# Patient Record
Sex: MALE | Race: WHITE | Employment: OTHER | URBAN - METROPOLITAN AREA
[De-identification: names, ages, dates, MRNs, and addresses within clinical notes are randomized per-mention and may not be internally consistent; named-entity substitution may affect disease eponyms.]

---

## 2017-01-24 DIAGNOSIS — I10 ESSENTIAL HYPERTENSION WITH GOAL BLOOD PRESSURE LESS THAN 140/90: Primary | ICD-10-CM

## 2017-01-24 RX ORDER — LISINOPRIL 10 MG/1
10 TABLET ORAL DAILY
Qty: 90 TABLET | Refills: 1 | Status: SHIPPED | OUTPATIENT
Start: 2017-01-24 | End: 2017-08-01

## 2017-01-24 NOTE — TELEPHONE ENCOUNTER
Prescription approved per [facility] Refill Protocol.  Refill x 6 months.    Zoila Crotez, [facility]  [phone]

## 2017-02-23 ENCOUNTER — TELEPHONE (OUTPATIENT)
Dept: FAMILY MEDICINE | Facility: CLINIC | Age: 72
End: 2017-02-23

## 2017-02-23 DIAGNOSIS — I10 HYPERTENSION GOAL BP (BLOOD PRESSURE) < 140/90: Primary | ICD-10-CM

## 2017-02-23 NOTE — TELEPHONE ENCOUNTER
"Per 8/4/16 BMP result note:  \"Notes Recorded by Fern Sheppard MD on 8/4/2016 at 4:47 PM  Your potassium was in the normal range. Your kidney function is mildly low, but appears fairly stable over the past couple of years. I recommend monitoring this about every 6 months. In the meantime, avoid NSAID medications (like ibuprofen, aleve, naproxen, advil, etc) which can damage the kidneys and try to remain well hydrated. \"      BMP pended.    Routing to covering providers.    Please review and sign if agree.    Thank you!  RICA WilloughbyN, RN      "

## 2017-02-23 NOTE — TELEPHONE ENCOUNTER
Patient called with the understanding Dr Sheppard wanted him to get a Metobolic panel lab but there's no order    Please advise

## 2017-02-24 NOTE — TELEPHONE ENCOUNTER
ASSESSMENT / PLAN:  (I10) Hypertension goal BP (blood pressure) < 140/90  (primary encounter diagnosis)  Comment: future labs signed.  Plan: Basic metabolic panel  (Ca, Cl, CO2, Creat,         Gluc, K, Na, BUN), Albumin Random Urine         Quantitative        Sincerely,  Dr. Alaina Dobbs MD  2/23/2017

## 2017-08-01 ENCOUNTER — OFFICE VISIT (OUTPATIENT)
Dept: FAMILY MEDICINE | Facility: CLINIC | Age: 72
End: 2017-08-01
Payer: MEDICARE

## 2017-08-01 VITALS
RESPIRATION RATE: 14 BRPM | BODY MASS INDEX: 18.75 KG/M2 | OXYGEN SATURATION: 99 % | TEMPERATURE: 98.2 F | DIASTOLIC BLOOD PRESSURE: 71 MMHG | WEIGHT: 141.5 LBS | HEIGHT: 73 IN | SYSTOLIC BLOOD PRESSURE: 145 MMHG | HEART RATE: 78 BPM

## 2017-08-01 DIAGNOSIS — E78.5 HYPERLIPIDEMIA LDL GOAL <130: ICD-10-CM

## 2017-08-01 DIAGNOSIS — N18.30 CKD (CHRONIC KIDNEY DISEASE) STAGE 3, GFR 30-59 ML/MIN (H): ICD-10-CM

## 2017-08-01 DIAGNOSIS — I10 ESSENTIAL HYPERTENSION WITH GOAL BLOOD PRESSURE LESS THAN 140/90: ICD-10-CM

## 2017-08-01 DIAGNOSIS — Z00.00 MEDICARE ANNUAL WELLNESS VISIT, SUBSEQUENT: Primary | ICD-10-CM

## 2017-08-01 DIAGNOSIS — N40.0 BENIGN PROSTATIC HYPERPLASIA, PRESENCE OF LOWER URINARY TRACT SYMPTOMS UNSPECIFIED: ICD-10-CM

## 2017-08-01 DIAGNOSIS — Z12.11 COLON CANCER SCREENING: ICD-10-CM

## 2017-08-01 PROBLEM — I12.9 BENIGN HYPERTENSION WITH CKD (CHRONIC KIDNEY DISEASE) STAGE III (H): Status: ACTIVE | Noted: 2017-08-01

## 2017-08-01 LAB
ALBUMIN SERPL-MCNC: 4 G/DL (ref 3.4–5)
ALP SERPL-CCNC: 65 U/L (ref 40–150)
ALT SERPL W P-5'-P-CCNC: 19 U/L (ref 0–70)
ANION GAP SERPL CALCULATED.3IONS-SCNC: 8 MMOL/L (ref 3–14)
AST SERPL W P-5'-P-CCNC: 16 U/L (ref 0–45)
BILIRUB SERPL-MCNC: 0.8 MG/DL (ref 0.2–1.3)
BUN SERPL-MCNC: 21 MG/DL (ref 7–30)
CALCIUM SERPL-MCNC: 9.3 MG/DL (ref 8.5–10.1)
CHLORIDE SERPL-SCNC: 108 MMOL/L (ref 94–109)
CHOLEST SERPL-MCNC: 163 MG/DL
CO2 SERPL-SCNC: 27 MMOL/L (ref 20–32)
CREAT SERPL-MCNC: 1.24 MG/DL (ref 0.66–1.25)
CREAT UR-MCNC: 260 MG/DL
GFR SERPL CREATININE-BSD FRML MDRD: 57 ML/MIN/1.7M2
GLUCOSE SERPL-MCNC: 102 MG/DL (ref 70–99)
HDLC SERPL-MCNC: 82 MG/DL
HGB BLD-MCNC: 14.3 G/DL (ref 13.3–17.7)
LDLC SERPL CALC-MCNC: 69 MG/DL
MICROALBUMIN UR-MCNC: 19 MG/L
MICROALBUMIN/CREAT UR: 7.19 MG/G CR (ref 0–17)
NONHDLC SERPL-MCNC: 81 MG/DL
POTASSIUM SERPL-SCNC: 5.2 MMOL/L (ref 3.4–5.3)
PROT SERPL-MCNC: 6.9 G/DL (ref 6.8–8.8)
SODIUM SERPL-SCNC: 143 MMOL/L (ref 133–144)
TRIGL SERPL-MCNC: 60 MG/DL

## 2017-08-01 PROCEDURE — 80061 LIPID PANEL: CPT | Performed by: FAMILY MEDICINE

## 2017-08-01 PROCEDURE — 82043 UR ALBUMIN QUANTITATIVE: CPT | Performed by: FAMILY MEDICINE

## 2017-08-01 PROCEDURE — 85018 HEMOGLOBIN: CPT | Performed by: FAMILY MEDICINE

## 2017-08-01 PROCEDURE — 36415 COLL VENOUS BLD VENIPUNCTURE: CPT | Performed by: FAMILY MEDICINE

## 2017-08-01 PROCEDURE — 80053 COMPREHEN METABOLIC PANEL: CPT | Performed by: FAMILY MEDICINE

## 2017-08-01 PROCEDURE — 99397 PER PM REEVAL EST PAT 65+ YR: CPT | Performed by: FAMILY MEDICINE

## 2017-08-01 RX ORDER — LISINOPRIL 10 MG/1
10 TABLET ORAL DAILY
Qty: 90 TABLET | Refills: 3 | Status: SHIPPED | OUTPATIENT
Start: 2017-08-01 | End: 2018-09-11

## 2017-08-01 RX ORDER — SIMVASTATIN 40 MG
40 TABLET ORAL AT BEDTIME
Qty: 90 TABLET | Refills: 3 | Status: SHIPPED | OUTPATIENT
Start: 2017-08-01 | End: 2018-09-11

## 2017-08-01 NOTE — PROGRESS NOTES
SUBJECTIVE:   Chavez Perkins is a 71 year old male who presents for Preventive Visit.  Are you in the first 12 months of your Medicare coverage?  No    Physical   Annual:     Getting at least 3 servings of Calcium per day::  Yes    Bi-annual eye exam::  Yes    Dental care twice a year::  Yes    Sleep apnea or symptoms of sleep apnea::  None    Taking medications regularly::  Yes    Medication side effects::  None    Additional concerns today::  No    COGNITIVE SCREEN  1) Repeat 3 items (Banana, Sunrise, Chair)    2) Clock draw: NORMAL  3) 3 item recall: Recalls 3 objects  Results: 3 items recalled: COGNITIVE IMPAIRMENT LESS LIKELY  Mini-CogTM Copyright S Amari. Licensed by the author for use in [facility] [employer]; reprinted with permission (jess@[email or web address]). All rights reserved.      Answers for HPI/ROS submitted by the patient on 8/1/2017   PHQ-2 Score: 0      Stopped HCTZ back in 5/2015 with increase urinary frequency at night. He notes that now he is getting up frequently regardless with BPH. After stopping the med he urinary frequency improved, but then returned to normal a few months after.        Wt Readings from Last 5 Encounters:   08/01/17 64.2 kg (141 lb 8 oz)   07/25/16 61.3 kg (135 lb 4 oz)   06/09/15 63.3 kg (139 lb 8 oz)   05/19/15 61.2 kg (135 lb)   11/19/14 62.3 kg (137 lb 4.8 oz)     Reviewed and updated as needed this visit by clinical staff  Reviewed and updated as needed this visit by Provider  Social History   Substance Use Topics     Smoking status: Never Smoker     Smokeless tobacco: Never Used     Alcohol use No     The patient does not drink >3 drinks per day nor >7 drinks per week.    Today's PHQ-2 Score:   PHQ-2 ( 1999 [employer]) 8/1/2017   Q1: Little interest or pleasure in doing things 0   Q2: Feeling down, depressed or hopeless 0   PHQ-2 Score 0   Q1: Little interest or pleasure in doing things Not at all   Q2: Feeling down, depressed or hopeless Not at all   PHQ-2 Score 0     Do you feel  safe in your environment - Yes    Do you have a Health Care Directive?: No: Advance care planning reviewed with patient; information given to patient to review.    Current providers sharing in care for this patient include: Patient Care Team:  Fern Sheppard MD as PCP - General (Family Practice)      Hearing impairment: No    Ability to successfully perform activities of daily living: Yes, no assistance needed     Fall risk:  Fallen 2 or more times in the past year?: No  Any fall with injury in the past year?: No    Home safety:  none identified    The following health maintenance items are reviewed in Epic and correct as of today:  Health Maintenance   Topic Date Due     HEMOGLOBIN Q1 YR  11/07/2015     MICROALBUMIN Q1 YEAR  05/19/2016     CMP Q1 YR  07/25/2017     LIPID MONITORING Q1 YEAR  07/25/2017     FALL RISK ASSESSMENT  07/25/2017     INFLUENZA VACCINE (SYSTEM ASSIGNED)  09/01/2017     TETANUS IMMUNIZATION (SYSTEM ASSIGNED)  01/14/2018     COLON CANCER SCREEN (SYSTEM ASSIGNED)  02/05/2018     ADVANCE DIRECTIVE PLANNING Q5 YRS  04/26/2018     PNEUMOCOCCAL  Completed     AORTIC ANEURYSM SCREENING (SYSTEM ASSIGNED)  Completed     BP Readings from Last 3 Encounters:   08/01/17 140/66   07/25/16 108/62   06/09/15 112/70    Wt Readings from Last 3 Encounters:   08/01/17 64.2 kg (141 lb 8 oz)   07/25/16 61.3 kg (135 lb 4 oz)   06/09/15 63.3 kg (139 lb 8 oz)         Patient Active Problem List   Diagnosis     HYPERLIPIDEMIA LDL GOAL <130     Advanced directives, counseling/discussion     Closed kidney laceration     BPH (benign prostatic hypertrophy)     CKD (chronic kidney disease) stage 3, GFR 30-59 ml/min     Benign hypertension with CKD (chronic kidney disease) stage III     Past Surgical History:   Procedure Laterality Date     HC COLONOSCOPY THRU STOMA, DIAGNOSTIC  2/5/2008    Normal     HC REMOVAL OF TONSILS,<13 Y/O  1950     STRESS ECHO (METRO)  1/06    passed       Social History   Substance Use Topics      Smoking status: Never Smoker     Smokeless tobacco: Never Used     Alcohol use No     Family History   Problem Relation Age of Onset     Hypertension Paternal Grandmother      C.A.D. Brother      C.A.D. Brother      DIABETES Brother      DIABETES Brother      CEREBROVASCULAR DISEASE Paternal Grandfather      Allergies Other      neice     Blood Disease Brother      low hgb         Current Outpatient Prescriptions   Medication Sig Dispense Refill     lisinopril (PRINIVIL/ZESTRIL) 10 MG tablet Take 1 tablet (10 mg) by mouth daily 90 tablet 3     simvastatin (ZOCOR) 40 MG tablet Take 1 tablet (40 mg) by mouth At Bedtime For cholesterol 90 tablet 3     ASPIRIN 81 MG OR TABS 1 tab po QD (Once per day) 100 3     [DISCONTINUED] lisinopril (PRINIVIL/ZESTRIL) 10 MG tablet Take 1 tablet (10 mg) by mouth daily 90 tablet 1     [DISCONTINUED] simvastatin (ZOCOR) 40 MG tablet Take 1 tablet (40 mg) by mouth At Bedtime For cholesterol 90 tablet 3     Allergies   Allergen Reactions     No Known Allergies      Recent Labs   Lab Test  08/04/16   0754  07/25/16   1244  05/19/15   1021   05/13/14   1052  04/26/13   0955  04/24/12   0839   03/05/10   0756   LDL   --   71  68   --   89  93  80   < >  100   HDL   --   74  82   --   78  88  70   < >  71   TRIG   --   48  71   --   69  69  65   < >  67   ALT   --   27   --    --    --   27  14   < >  24   CR  1.30*  1.19   --    < >  1.17  1.16  1.15   < >  1.11   GFRESTIMATED  54*  60*   --    < >  62  63  63   < >  66   GFRESTBLACK  66  73   --    < >  75  76  77   < >  80   POTASSIUM  4.4  5.7*   --    < >  4.7  4.0  4.0   < >  4.2   TSH   --    --    --    --    --   2.35   --    --   2.09    < > = values in this interval not displayed.      Pneumonia Vaccine: COMPLETED     ROS:   ROS: 10 point ROS neg other than the symptoms noted above in the HPI.    This document serves as a record of the services and decisions personally performed and made by Fern Sheppard MD. It was created on  "his/her behalf by Yumiko Meng, trained medical scribe. The creation of this document is based the provider's statements to the medical scribes.    Clifotnibjaspal Meng, August 1, 2017  OBJECTIVE:   /66  Pulse 78  Temp 98.2  F (36.8  C) (Oral)  Resp 14  Ht 1.842 m (6' 0.5\")  Wt 64.2 kg (141 lb 8 oz)  SpO2 99%  BMI 18.93 kg/m2 Estimated body mass index is 18.93 kg/(m^2) as calculated from the following:    Height as of this encounter: 1.842 m (6' 0.5\").    Weight as of this encounter: 64.2 kg (141 lb 8 oz).  EXAM:   GENERAL: healthy, alert and no distress  EYES: Eyes grossly normal to inspection, PERRL and conjunctivae and sclerae normal  HENT: ear canals and TM's normal, nose and mouth without ulcers or lesions  NECK: no adenopathy, no asymmetry, masses, or scars and thyroid normal to palpation  RESP: lungs clear to auscultation - no rales, rhonchi or wheezes  CV: regular rate and rhythm, normal S1 S2, no S3 or S4, no murmur, click or rub, no peripheral edema and peripheral pulses strong  ABDOMEN: soft, nontender, no hepatosplenomegaly, no masses and bowel sounds normal  MS: no gross musculoskeletal defects noted, no edema  SKIN: no suspicious lesions or rashes  NEURO: Normal strength and tone, mentation intact and speech normal  PSYCH: mentation appears normal, affect normal/bright    ASSESSMENT / PLAN:   1. Medicare annual wellness visit, subsequent  He will schedule colonoscopy.  Fasting labs today.    2. Essential hypertension with goal blood pressure less than 140/90  Slightly elevated. MA will recheck. If still elevated he will return to clinic for an MA BP check.   He was previously on HCTZ, stopped 5/2015. Continues on lisinopril 10 mg.  - lisinopril (PRINIVIL/ZESTRIL) 10 MG tablet; Take 1 tablet (10 mg) by mouth daily  Dispense: 90 tablet; Refill: 3    3. CKD (chronic kidney disease) stage 3, GFR 30-59 ml/min  Stable.   - Comprehensive metabolic panel  - Albumin Random Urine " "Quantitative  - Hemoglobin    4. Benign prostatic hyperplasia, presence of lower urinary tract symptoms unspecified  Stable.  He continues to have nocturia, that has been stable since going off HCTZ in 2015.    5. Hyperlipidemia LDL goal <130  Stable. Continues simvastatin 40 mg.  - simvastatin (ZOCOR) 40 MG tablet; Take 1 tablet (40 mg) by mouth At Bedtime For cholesterol  Dispense: 90 tablet; Refill: 3  - Lipid panel reflex to direct LDL    6. Colon cancer screening    - GASTROENTEROLOGY ADULT REF PROCEDURE ONLY      End of Life Planning:  Patient currently has an advanced directive: No.  I have verified the patient's ablity to prepare an advanced directive/make health care decisions.  Literature was provided to assist patient in preparing an advanced directive.    COUNSELING:  Reviewed preventive health counseling, as reflected in patient instructions  Estimated body mass index is 18.93 kg/(m^2) as calculated from the following:    Height as of this encounter: 1.842 m (6' 0.5\").    Weight as of this encounter: 64.2 kg (141 lb 8 oz).  Weight management plan noted, stable and monitoring   reports that he has never smoked. He has never used smokeless tobacco.     Appropriate preventive services were discussed with this patient, including applicable screening as appropriate for cardiovascular disease, diabetes, osteopenia/osteoporosis, and glaucoma.  As appropriate for age/gender, discussed screening for colorectal cancer, prostate cancer, breast cancer, and cervical cancer. Checklist reviewing preventive services available has been given to the patient.    Reviewed patients plan of care and provided an AVS. The Basic Care Plan (routine screening as documented in Health Maintenance) for Chavez meets the Care Plan requirement. This Care Plan has been established and reviewed with the Patient.    Counseling Resources:  ATP IV Guidelines  Pooled Cohorts Equation Calculator  Breast Cancer Risk Calculator  FRAX Risk " Assessment  ICSI Preventive Guidelines  Dietary Guidelines for Americans, 2010  USDA's MyPlate  ASA Prophylaxis  Lung CA Screening    The information in this document, created by the medical scribe for me, accurately reflects the services I personally performed and the decisions made by me. I have reviewed and approved this document for accuracy. 08/01/17    Fern Sheppard MD  [facility]

## 2017-08-01 NOTE — LETTER
August 30, 2017      Chavez Perkins  [address]  [place] [address]        Dear ,    We are writing to inform you of your test results.    Your urine protein test was stable. Please let us know if you have any questions.    Resulted Orders   Comprehensive metabolic panel   Result Value Ref Range    Sodium 143 133 - 144 mmol/L    Potassium 5.2 3.4 - 5.3 mmol/L    Chloride 108 94 - 109 mmol/L    Carbon Dioxide 27 20 - 32 mmol/L    Anion Gap 8 3 - 14 mmol/L    Glucose 102 (H) 70 - 99 mg/dL      Comment:      Fasting specimen    Urea Nitrogen 21 7 - 30 mg/dL    Creatinine 1.24 0.66 - 1.25 mg/dL    GFR Estimate 57 (L) >60 mL/min/1.7m2      Comment:      Non  GFR Calc    GFR Estimate If Black 69 >60 mL/min/1.7m2      Comment:       GFR Calc    Calcium 9.3 8.5 - 10.1 mg/dL    Bilirubin Total 0.8 0.2 - 1.3 mg/dL    Albumin 4.0 3.4 - 5.0 g/dL    Protein Total 6.9 6.8 - 8.8 g/dL    Alkaline Phosphatase 65 40 - 150 U/L    ALT 19 0 - 70 U/L    AST 16 0 - 45 U/L   Lipid panel reflex to direct LDL   Result Value Ref Range    Cholesterol 163 <200 mg/dL    Triglycerides 60 <150 mg/dL      Comment:      Fasting specimen    HDL Cholesterol 82 >39 mg/dL    LDL Cholesterol Calculated 69 <100 mg/dL      Comment:      Desirable:       <100 mg/dl    Non HDL Cholesterol 81 <130 mg/dL   Albumin Random Urine Quantitative   Result Value Ref Range    Creatinine Urine 260 mg/dL    Albumin Urine mg/L 19 mg/L    Albumin Urine mg/g Cr 7.19 0 - 17 mg/g Cr   Hemoglobin   Result Value Ref Range    Hemoglobin 14.3 13.3 - 17.7 g/dL       If you have any questions or concerns, please call the clinic at the number listed above.       Sincerely,        Fern Sheppard MD/nr

## 2017-08-01 NOTE — LETTER
Chavez Perkins  [address]  [place] [facility] [address]        August 2, 2017          Dear ,    We are writing to inform you of your test results.    Your lab results are stable. Please let us know if you have any questions    Resulted Orders   Comprehensive metabolic panel   Result Value Ref Range    Sodium 143 133 - 144 mmol/L    Potassium 5.2 3.4 - 5.3 mmol/L    Chloride 108 94 - 109 mmol/L    Carbon Dioxide 27 20 - 32 mmol/L    Anion Gap 8 3 - 14 mmol/L    Glucose 102 (H) 70 - 99 mg/dL      Comment:      Fasting specimen    Urea Nitrogen 21 7 - 30 mg/dL    Creatinine 1.24 0.66 - 1.25 mg/dL    GFR Estimate 57 (L) >60 mL/min/1.7m2      Comment:      Non  GFR Calc    GFR Estimate If Black 69 >60 mL/min/1.7m2      Comment:       GFR Calc    Calcium 9.3 8.5 - 10.1 mg/dL    Bilirubin Total 0.8 0.2 - 1.3 mg/dL    Albumin 4.0 3.4 - 5.0 g/dL    Protein Total 6.9 6.8 - 8.8 g/dL    Alkaline Phosphatase 65 40 - 150 U/L    ALT 19 0 - 70 U/L    AST 16 0 - 45 U/L   Lipid panel reflex to direct LDL   Result Value Ref Range    Cholesterol 163 <200 mg/dL    Triglycerides 60 <150 mg/dL      Comment:      Fasting specimen    HDL Cholesterol 82 >39 mg/dL    LDL Cholesterol Calculated 69 <100 mg/dL      Comment:      Desirable:       <100 mg/dl    Non HDL Cholesterol 81 <130 mg/dL   Hemoglobin   Result Value Ref Range    Hemoglobin 14.3 13.3 - 17.7 g/dL       If you have any questions or concerns, please call the clinic at the number listed above.       Sincerely,        Fern Sheppard MD/nr

## 2017-08-01 NOTE — MR AVS SNAPSHOT
After Visit Summary   8/1/2017    Chavez Perkins    MRN: [id number]           Patient Information     Date Of Birth          1945        Visit Information        Provider Department      8/1/2017 8:20 AM Fern Sheppard MD [facility]        Today's Diagnoses     Medicare annual wellness visit, subsequent    -  1    Essential hypertension with goal blood pressure less than 140/90        CKD (chronic kidney disease) stage 3, GFR 30-59 ml/min        Benign prostatic hyperplasia, presence of lower urinary tract symptoms unspecified        Hyperlipidemia LDL goal <130        Colon cancer screening          Care Instructions      Preventive Health Recommendations:       Male Ages 65 and over    Yearly exam:             See your health care provider every year in order to  o   Review health changes.   o   Discuss preventive care.    o   Review your medicines if your doctor has prescribed any.    Talk with your health care provider about whether you should have a test to screen for prostate cancer (PSA).    Every 3 years, have a diabetes test (fasting glucose). If you are at risk for diabetes, you should have this test more often.    Every 5 years, have a cholesterol test. Have this test more often if you are at risk for high cholesterol or heart disease.     Every 10 years, have a colonoscopy. Or, have a yearly FIT test (stool test). These exams will check for colon cancer.    Talk to with your health care provider about screening for Abdominal Aortic Aneurysm if you have a family history of AAA or have a history of smoking.  Shots:     Get a flu shot each year.     Get a tetanus shot every 10 years.     Talk to your doctor about your pneumonia vaccines. There are now two you should receive - Pneumovax (PPSV 23) and Prevnar (PCV 13).    Talk to your doctor about a shingles vaccine.     Talk to your doctor about the hepatitis B vaccine.  Nutrition:     Eat at least 5 servings of fruits and  vegetables each day.     Eat whole-grain bread, whole-wheat pasta and brown rice instead of white grains and rice.     Talk to your doctor about Calcium and Vitamin D.   Lifestyle    Exercise for at least 150 minutes a week (30 minutes a day, 5 days a week). This will help you control your weight and prevent disease.     Limit alcohol to one drink per day.     No smoking.     Wear sunscreen to prevent skin cancer.     See your dentist every six months for an exam and cleaning.     See your eye doctor every 1 to 2 years to screen for conditions such as glaucoma, macular degeneration and cataracts.          Follow-ups after your visit        Additional Services     GASTROENTEROLOGY ADULT REF PROCEDURE ONLY       Last Lab Result: Creatinine (mg/dL)       Date                     Value                 08/04/2016               1.30 (H)         ----------  Body mass index is 18.93 kg/(m^2).      Patient will be contacted to schedule procedure.     Please be aware that coverage of these services is subject to the terms and limitations of your health insurance plan.  Call member services at your health plan with any benefit or coverage questions.  Any procedures must be performed at a [facility] facility OR coordinated by your clinic's referral office.    Please bring the following with you to your appointment:    (1) Any X-Rays, CTs or MRIs which have been performed.  Contact the facility where they were done to arrange for  prior to your scheduled appointment.    (2) List of current medications   (3) This referral request   (4) Any documents/labs given to you for this referral                  Who to contact     If you have questions or need follow up information about today's clinic visit or your schedule please contact [facility] directly at [phone].  Normal or non-critical lab and imaging results will be communicated to you by MyChart, letter or phone within 4 business days after the clinic has  "received the results. If you do not hear from us within 7 days, please contact the clinic through [employer] or phone. If you have a critical or abnormal lab result, we will notify you by phone as soon as possible.  Submit refill requests through [employer] or call your pharmacy and they will forward the refill request to us. Please allow 3 business days for your refill to be completed.          Additional Information About Your Visit        [employer] Information     [employer] lets you send messages to your doctor, view your test results, renew your prescriptions, schedule appointments and more. To sign up, go to [email or web address] . Click on \"Log in\" on the left side of the screen, which will take you to the Welcome page. Then click on \"Sign up Now\" on the right side of the page.     You will be asked to enter the access code listed below, as well as some personal information. Please follow the directions to create your username and password.     Your access code is: [email or web address]  Expires: 10/30/2017  8:43 AM     Your access code will  in 90 days. If you need help or a new code, please call your [facility] clinic or [phone].        Care EveryWhere ID     This is your Care EveryWhere ID. This could be used by other organizations to access your [facility] medical records  [id number]        Your Vitals Were     Pulse Temperature Respirations Height Pulse Oximetry BMI (Body Mass Index)    78 98.2  F (36.8  C) (Oral) 14 6' 0.5\" (1.842 m) 99% 18.93 kg/m2       Blood Pressure from Last 3 Encounters:   17 140/66   16 108/62   06/09/15 112/70    Weight from Last 3 Encounters:   17 141 lb 8 oz (64.2 kg)   16 135 lb 4 oz (61.3 kg)   06/09/15 139 lb 8 oz (63.3 kg)              We Performed the Following     Albumin Random Urine Quantitative     Comprehensive metabolic panel     GASTROENTEROLOGY ADULT REF PROCEDURE ONLY     Hemoglobin     Lipid panel reflex to direct LDL          Where to get your " medicines      These medications were sent to [facility] - [place], [place] - [address]  [address], [facility] [address]     Phone:  [phone]     lisinopril 10 MG tablet    simvastatin 40 MG tablet          Primary Care Provider Office Phone # Fax #    Fern Sheppard [phone]       [facility] [address]  [facility] [address]        Equal Access to Services     VERONICA CRAWFORD : Hadii aad ku hadasho Soomaali, waaxda luqadaha, qaybta kaalmada adeegyada, waxay idiin hayaan adeeg kharash la'anthonyn . So [facility] [phone].    ATENCIÓN: Si habla español, tiene a graves disposición servicios gratuitos de asistencia lingüística. [facility] [phone].    We comply with applicable federal civil rights laws and [place] laws. We do not discriminate on the basis of race, color, national origin, age, disability sex, sexual orientation or gender identity.            Thank you!     Thank you for choosing [facility]  for your care. Our goal is always to provide you with excellent care. Hearing back from our patients is one way we can continue to improve our services. Please take a few minutes to complete the written survey that you may receive in the mail after your visit with us. Thank you!             Your Updated Medication List - Protect others around you: Learn how to safely use, store and throw away your medicines at www.disposemymeds.org.          This list is accurate as of: 8/1/17  8:43 AM.  Always use your most recent med list.                   Brand Name Dispense Instructions for use Diagnosis    aspirin 81 MG tablet     100    1 tab po QD (Once per day)    Unspecified essential hypertension       lisinopril 10 MG tablet    PRINIVIL/ZESTRIL    90 tablet    Take 1 tablet (10 mg) by mouth daily    Essential hypertension with goal blood pressure less than 140/90       simvastatin 40 MG tablet    ZOCOR    90 tablet    Take 1 tablet (40 mg) by mouth At  Bedtime For cholesterol    Hyperlipidemia LDL goal <130

## 2017-08-01 NOTE — NURSING NOTE
"Chief Complaint   Patient presents with     Medicare Visit       Initial /66  Pulse 78  Temp 98.2  F (36.8  C) (Oral)  Resp 14  Ht 6' 0.5\" (1.842 m)  Wt 141 lb 8 oz (64.2 kg)  SpO2 99%  BMI 18.93 kg/m2 Estimated body mass index is 18.93 kg/(m^2) as calculated from the following:    Height as of this encounter: 6' 0.5\" (1.842 m).    Weight as of this encounter: 141 lb 8 oz (64.2 kg).  Medication Reconciliation: complete     Dominga Mann MA     "

## 2017-08-08 ENCOUNTER — ALLIED HEALTH/NURSE VISIT (OUTPATIENT)
Dept: NURSING | Facility: CLINIC | Age: 72
End: 2017-08-08
Payer: MEDICARE

## 2017-08-08 VITALS — OXYGEN SATURATION: 99 % | SYSTOLIC BLOOD PRESSURE: 126 MMHG | HEART RATE: 78 BPM | DIASTOLIC BLOOD PRESSURE: 58 MMHG

## 2017-08-08 DIAGNOSIS — Z01.30 BP CHECK: Primary | ICD-10-CM

## 2017-08-08 PROCEDURE — 99207 ZZC NO CHARGE NURSE ONLY: CPT

## 2017-08-08 NOTE — MR AVS SNAPSHOT
"              After Visit Summary   2017    Chavez Perkins    MRN: [id number]           Patient Information     Date Of Birth          1945        Visit Information        Provider Department      2017 9:00 AM HW MEDICAL ASSISTANT [facility] [place]        Today's Diagnoses     BP check    -  1       Follow-ups after your visit        Who to contact     If you have questions or need follow up information about today's clinic visit or your schedule please contact [facility] directly at [phone].  Normal or non-critical lab and imaging results will be communicated to you by MyChart, letter or phone within 4 business days after the clinic has received the results. If you do not hear from us within 7 days, please contact the clinic through [employer] or phone. If you have a critical or abnormal lab result, we will notify you by phone as soon as possible.  Submit refill requests through [facility] [employer] or call your pharmacy and they will forward the refill request to us. Please allow 3 business days for your refill to be completed.          Additional Information About Your Visit        MyChart Information     [facility] lets you send messages to your doctor, view your test results, renew your prescriptions, schedule appointments and more. To sign up, go to [email or web address] . Click on \"Log in\" on the left side of the screen, which will take you to the Welcome page. Then click on \"Sign up Now\" on the right side of the page.     You will be asked to enter the access code listed below, as well as some personal information. Please follow the directions to create your username and password.     Your access code is: [email or web address]  Expires: 10/30/2017  8:43 AM     Your access code will  in 90 days. If you need help or a new code, please call your [facility] or [phone].        Care EveryWhere ID     This is your Care EveryWhere ID. This could be used by other organizations to access " your [place] medical records  [id number]        Your Vitals Were     Pulse Pulse Oximetry                78 99%           Blood Pressure from Last 3 Encounters:   08/08/17 126/58   08/01/17 145/71   07/25/16 108/62    Weight from Last 3 Encounters:   08/01/17 141 lb 8 oz (64.2 kg)   07/25/16 135 lb 4 oz (61.3 kg)   06/09/15 139 lb 8 oz (63.3 kg)              Today, you had the following     No orders found for display       Primary Care Provider Office Phone # Fax #    Fern Sheppard [phone]       [facility] [address]  [facility] [address]        Equal Access to Services     VERONICA CRAWFORD : Hadii matthew ku hadasho Sorezaali, waaxda luqadaha, qaybta kaalmada adeegyada, víctor neil . So [facility] [phone].    ATENCIÓN: Si habla español, tiene a graves disposición servicios gratuitos de asistencia lingüística. Llame al [phone].    We comply with applicable federal civil rights laws and [place] laws. We do not discriminate on the basis of race, color, national origin, age, disability sex, sexual orientation or gender identity.            Thank you!     Thank you for choosing [facility]  for your care. Our goal is always to provide you with excellent care. Hearing back from our patients is one way we can continue to improve our services. Please take a few minutes to complete the written survey that you may receive in the mail after your visit with us. Thank you!             Your Updated Medication List - Protect others around you: Learn how to safely use, store and throw away your medicines at www.disposemymeds.org.          This list is accurate as of: 8/8/17  9:11 AM.  Always use your most recent med list.                   Brand Name Dispense Instructions for use Diagnosis    aspirin 81 MG tablet     100    1 tab po QD (Once per day)    Unspecified essential hypertension       lisinopril 10 MG tablet    PRINIVIL/ZESTRIL    90 tablet     Take 1 tablet (10 mg) by mouth daily    Essential hypertension with goal blood pressure less than 140/90       simvastatin 40 MG tablet    ZOCOR    90 tablet    Take 1 tablet (40 mg) by mouth At Bedtime For cholesterol    Hyperlipidemia LDL goal <130

## 2018-09-11 ENCOUNTER — OFFICE VISIT (OUTPATIENT)
Dept: FAMILY MEDICINE | Facility: CLINIC | Age: 73
End: 2018-09-11
Payer: COMMERCIAL

## 2018-09-11 VITALS
HEART RATE: 72 BPM | HEIGHT: 74 IN | OXYGEN SATURATION: 98 % | WEIGHT: 136.75 LBS | DIASTOLIC BLOOD PRESSURE: 64 MMHG | SYSTOLIC BLOOD PRESSURE: 122 MMHG | TEMPERATURE: 98.5 F | BODY MASS INDEX: 17.55 KG/M2

## 2018-09-11 DIAGNOSIS — I12.9 BENIGN HYPERTENSION WITH CKD (CHRONIC KIDNEY DISEASE) STAGE III (H): ICD-10-CM

## 2018-09-11 DIAGNOSIS — E78.5 HYPERLIPIDEMIA LDL GOAL <130: ICD-10-CM

## 2018-09-11 DIAGNOSIS — Z12.11 SCREEN FOR COLON CANCER: ICD-10-CM

## 2018-09-11 DIAGNOSIS — N40.1 BENIGN PROSTATIC HYPERPLASIA WITH NOCTURIA: ICD-10-CM

## 2018-09-11 DIAGNOSIS — Z23 NEED FOR PROPHYLACTIC VACCINATION AND INOCULATION AGAINST INFLUENZA: ICD-10-CM

## 2018-09-11 DIAGNOSIS — N18.30 BENIGN HYPERTENSION WITH CKD (CHRONIC KIDNEY DISEASE) STAGE III (H): ICD-10-CM

## 2018-09-11 DIAGNOSIS — I10 ESSENTIAL HYPERTENSION WITH GOAL BLOOD PRESSURE LESS THAN 140/90: ICD-10-CM

## 2018-09-11 DIAGNOSIS — Z23 NEED FOR PROPHYLACTIC VACCINATION WITH TETANUS-DIPHTHERIA (TD): ICD-10-CM

## 2018-09-11 DIAGNOSIS — R35.1 BENIGN PROSTATIC HYPERPLASIA WITH NOCTURIA: ICD-10-CM

## 2018-09-11 DIAGNOSIS — Z00.00 ENCOUNTER FOR ROUTINE ADULT HEALTH EXAMINATION WITHOUT ABNORMAL FINDINGS: Primary | ICD-10-CM

## 2018-09-11 DIAGNOSIS — N18.30 CKD (CHRONIC KIDNEY DISEASE) STAGE 3, GFR 30-59 ML/MIN (H): ICD-10-CM

## 2018-09-11 LAB
ALBUMIN SERPL-MCNC: 4.4 G/DL (ref 3.4–5)
ALP SERPL-CCNC: 73 U/L (ref 40–150)
ALT SERPL W P-5'-P-CCNC: 27 U/L (ref 0–70)
ANION GAP SERPL CALCULATED.3IONS-SCNC: 7 MMOL/L (ref 3–14)
AST SERPL W P-5'-P-CCNC: 26 U/L (ref 0–45)
BILIRUB SERPL-MCNC: 1 MG/DL (ref 0.2–1.3)
BUN SERPL-MCNC: 25 MG/DL (ref 7–30)
CALCIUM SERPL-MCNC: 9.4 MG/DL (ref 8.5–10.1)
CHLORIDE SERPL-SCNC: 108 MMOL/L (ref 94–109)
CHOLEST SERPL-MCNC: 163 MG/DL
CO2 SERPL-SCNC: 28 MMOL/L (ref 20–32)
CREAT SERPL-MCNC: 1.25 MG/DL (ref 0.66–1.25)
CREAT UR-MCNC: 242 MG/DL
GFR SERPL CREATININE-BSD FRML MDRD: 56 ML/MIN/1.7M2
GLUCOSE SERPL-MCNC: 82 MG/DL (ref 70–99)
HDLC SERPL-MCNC: 80 MG/DL
HGB BLD-MCNC: 15.9 G/DL (ref 13.3–17.7)
LDLC SERPL CALC-MCNC: 71 MG/DL
MICROALBUMIN UR-MCNC: 20 MG/L
MICROALBUMIN/CREAT UR: 8.31 MG/G CR (ref 0–17)
NONHDLC SERPL-MCNC: 83 MG/DL
POTASSIUM SERPL-SCNC: 5 MMOL/L (ref 3.4–5.3)
PROT SERPL-MCNC: 7.4 G/DL (ref 6.8–8.8)
SODIUM SERPL-SCNC: 143 MMOL/L (ref 133–144)
TRIGL SERPL-MCNC: 61 MG/DL

## 2018-09-11 PROCEDURE — 90715 TDAP VACCINE 7 YRS/> IM: CPT | Performed by: FAMILY MEDICINE

## 2018-09-11 PROCEDURE — 36415 COLL VENOUS BLD VENIPUNCTURE: CPT | Performed by: FAMILY MEDICINE

## 2018-09-11 PROCEDURE — 80053 COMPREHEN METABOLIC PANEL: CPT | Performed by: FAMILY MEDICINE

## 2018-09-11 PROCEDURE — 82043 UR ALBUMIN QUANTITATIVE: CPT | Performed by: FAMILY MEDICINE

## 2018-09-11 PROCEDURE — 90471 IMMUNIZATION ADMIN: CPT | Performed by: FAMILY MEDICINE

## 2018-09-11 PROCEDURE — 90662 IIV NO PRSV INCREASED AG IM: CPT | Performed by: FAMILY MEDICINE

## 2018-09-11 PROCEDURE — 85018 HEMOGLOBIN: CPT | Performed by: FAMILY MEDICINE

## 2018-09-11 PROCEDURE — 99397 PER PM REEVAL EST PAT 65+ YR: CPT | Mod: 25 | Performed by: FAMILY MEDICINE

## 2018-09-11 PROCEDURE — G0008 ADMIN INFLUENZA VIRUS VAC: HCPCS | Mod: 59 | Performed by: FAMILY MEDICINE

## 2018-09-11 PROCEDURE — 80061 LIPID PANEL: CPT | Performed by: FAMILY MEDICINE

## 2018-09-11 RX ORDER — SIMVASTATIN 40 MG
40 TABLET ORAL AT BEDTIME
Qty: 90 TABLET | Refills: 3 | Status: SHIPPED | OUTPATIENT
Start: 2018-09-11 | End: 2019-10-15

## 2018-09-11 RX ORDER — LISINOPRIL 10 MG/1
10 TABLET ORAL DAILY
Qty: 90 TABLET | Refills: 3 | Status: SHIPPED | OUTPATIENT
Start: 2018-09-11 | End: 2019-10-15

## 2018-09-11 ASSESSMENT — ACTIVITIES OF DAILY LIVING (ADL)
I_NEED_ASSISTANCE_FOR_THE_FOLLOWING_DAILY_ACTIVITIES:: NO ASSISTANCE IS NEEDED
CURRENT_FUNCTION: NO ASSISTANCE NEEDED

## 2018-09-11 NOTE — MR AVS SNAPSHOT
After Visit Summary   9/11/2018    Chavez Perkins    MRN: [id number]           Patient Information     Date Of Birth          1945        Visit Information        Provider Department      9/11/2018 9:20 AM Fern Sheppard MD [facility]        Today's Diagnoses     Encounter for routine adult health examination without abnormal findings    -  1    Essential hypertension with goal blood pressure less than 140/90        Hyperlipidemia LDL goal <130        CKD (chronic kidney disease) stage 3, GFR 30-59 ml/min        Benign prostatic hyperplasia with nocturia        Benign hypertension with CKD (chronic kidney disease) stage III        Screen for colon cancer        Need for prophylactic vaccination with tetanus-diphtheria (TD)          Care Instructions      Preventive Health Recommendations:       Male Ages 65 and over    Yearly exam:             See your health care provider every year in order to  o   Review health changes.   o   Discuss preventive care.    o   Review your medicines if your doctor has prescribed any.    Talk with your health care provider about whether you should have a test to screen for prostate cancer (PSA).    Every 3 years, have a diabetes test (fasting glucose). If you are at risk for diabetes, you should have this test more often.    Every 5 years, have a cholesterol test. Have this test more often if you are at risk for high cholesterol or heart disease.     Every 10 years, have a colonoscopy. Or, have a yearly FIT test (stool test). These exams will check for colon cancer.    Talk to with your health care provider about screening for Abdominal Aortic Aneurysm if you have a family history of AAA or have a history of smoking.  Shots:     Get a flu shot each year.     Get a tetanus shot every 10 years.     Talk to your doctor about your pneumonia vaccines. There are now two you should receive - Pneumovax (PPSV 23) and Prevnar (PCV 13).    Talk to your  pharmacist about a shingles vaccine.     Talk to your doctor about the hepatitis B vaccine.  Nutrition:     Eat at least 5 servings of fruits and vegetables each day.     Eat whole-grain bread, whole-wheat pasta and brown rice instead of white grains and rice.     Get adequate Calcium and Vitamin D.   Lifestyle    Exercise for at least 150 minutes a week (30 minutes a day, 5 days a week). This will help you control your weight and prevent disease.     Limit alcohol to one drink per day.     No smoking.     Wear sunscreen to prevent skin cancer.     See your dentist every six months for an exam and cleaning.     See your eye doctor every 1 to 2 years to screen for conditions such as glaucoma, macular degeneration and cataracts.          Follow-ups after your visit        Future tests that were ordered for you today     Open Future Orders        Priority Expected Expires Ordered    Fecal colorectal cancer screen FIT Routine 10/2/2018 12/4/2018 9/11/2018            Who to contact     If you have questions or need follow up information about today's clinic visit or your schedule please contact [facility] directly at [phone].  Normal or non-critical lab and imaging results will be communicated to you by MyChart, letter or phone within 4 business days after the clinic has received the results. If you do not hear from us within 7 days, please contact the clinic through MyChart or phone. If you have a critical or abnormal lab result, we will notify you by phone as soon as possible.  Submit refill requests through [employer] or call your pharmacy and they will forward the refill request to us. Please allow 3 business days for your refill to be completed.          Additional Information About Your Visit        Care EveryWhere ID     This is your Care EveryWhere ID. This could be used by other organizations to access your [place] medical records  [id number]        Your Vitals Were     Pulse Temperature  "Height Pulse Oximetry BMI (Body Mass Index)       72 98.5  F (36.9  C) (Oral) 6' 1.7\" (1.872 m) 98% 17.7 kg/m2        Blood Pressure from Last 3 Encounters:   09/11/18 122/64   08/08/17 126/58   08/01/17 145/71    Weight from Last 3 Encounters:   09/11/18 136 lb 12 oz (62 kg)   08/01/17 141 lb 8 oz (64.2 kg)   07/25/16 135 lb 4 oz (61.3 kg)              We Performed the Following     Albumin Random Urine Quantitative with Creat Ratio     COMPREHENSIVE METABOLIC PANEL     Hemoglobin     Lipid panel reflex to direct LDL Fasting     TDAP VACCINE (ADACEL)          Where to get your medicines      These medications were sent to [facility] [place], [place] - [address]  [address] [facility] [address]     Phone:  [phone]     lisinopril 10 MG tablet    simvastatin 40 MG tablet          Primary Care Provider Office Phone # Fax #    Fern Sheppard [phone]       [address]  [facility] [address]        Equal Access to Services     [facility]: Hadii aad ku hadasho Sorezaali, waaxda luqadaha, qaybta kaalmada adejayyada, víctor neil . So [facility] [place] [phone].    ATENCIÓN: Si habla español, tiene a graves disposición servicios gratuitos de asistencia lingüística. Llame al [phone].    We comply with applicable federal civil rights laws and [place] laws. We do not discriminate on the basis of race, color, national origin, age, disability, sex, sexual orientation, or gender identity.            Thank you!     Thank you for choosing [facility]  for your care. Our goal is always to provide you with excellent care. Hearing back from our patients is one way we can continue to improve our services. Please take a few minutes to complete the written survey that you may receive in the mail after your visit with us. Thank you!             Your Updated Medication List - Protect others around you: Learn how to safely use, store and throw " away your medicines at www.disposemymeds.org.          This list is accurate as of 9/11/18 10:14 AM.  Always use your most recent med list.                   Brand Name Dispense Instructions for use Diagnosis    aspirin 81 MG tablet     100    1 tab po QD (Once per day)    Unspecified essential hypertension       lisinopril 10 MG tablet    PRINIVIL/ZESTRIL    90 tablet    Take 1 tablet (10 mg) by mouth daily    Essential hypertension with goal blood pressure less than 140/90       simvastatin 40 MG tablet    ZOCOR    90 tablet    Take 1 tablet (40 mg) by mouth At Bedtime For cholesterol    Hyperlipidemia LDL goal <130

## 2018-09-11 NOTE — PROGRESS NOTES

## 2018-09-11 NOTE — PROGRESS NOTES
SUBJECTIVE:   Chavez Perkins is a 72 year old male who presents for Preventive Visit.    Are you in the first 12 months of your Medicare coverage?  No    Physical   Annual:     Getting at least 3 servings of Calcium per day:  Yes    Bi-annual eye exam:  Yes    Dental care twice a year:  Yes    Sleep apnea or symptoms of sleep apnea:  None    Diet:  Low fat/cholesterol and Carbohydrate counting    Frequency of exercise:  1 day/week    Duration of exercise:  15-30 minutes    Taking medications regularly:  Yes    Medication side effects:  None    Additional concerns today:  No    Ability to successfully perform activities of daily living: no assistance needed    Home Safety:  No safety concerns identified    Hearing Impairment: difficulty following a conversation in a noisy restaurant or crowded room, feel that people are mumbling or not speaking clearly and need to ask people to speak up or repeat themselves    Fall risk:  Fallen 2 or more times in the past year?: No  Any fall with injury in the past year?: No    COGNITIVE SCREEN  1) Repeat 3 items (Leader, Season, Table)    2) Clock draw: NORMAL  3) 3 item recall: Recalls 3 objects  Results: 3 items recalled: COGNITIVE IMPAIRMENT LESS LIKELY    Mini-CogTM Copyright LAWRENCE Fitzpatrick. Licensed by the author for use in [facility]; reprinted with permission (jess@[email or web address]). All rights reserved.        Reviewed and updated as needed this visit by clinical staff  Allergies  Meds         Reviewed and updated as needed this visit by Provider        Social History   Substance Use Topics     Smoking status: Never Smoker     Smokeless tobacco: Never Used     Alcohol use No       Alcohol Use 9/11/2018   If you drink alcohol do you typically have greater than 3 drinks per day OR greater than 7 drinks per week? No   No flowsheet data found.      Today's PHQ-2 Score:   PHQ-2 ( 1999 [employer]) 9/11/2018   Q1: Little interest or pleasure in doing things 0   Q2: Feeling down,  depressed or hopeless 0   PHQ-2 Score 0   Q1: Little interest or pleasure in doing things Not at all   Q2: Feeling down, depressed or hopeless Not at all   PHQ-2 Score 0       Do you feel safe in your environment - Yes    Do you have a Health Care Directive?: No: Advance care planning was reviewed with patient; patient declined at this time.    Current providers sharing in care for this patient include:   Patient Care Team:  Fern Sheppard MD as PCP - General (Family Practice)    The following health maintenance items are reviewed in Epic and correct as of today:  Health Maintenance   Topic Date Due     FIT Q1 YR  09/18/1955     TETANUS IMMUNIZATION (SYSTEM ASSIGNED)  01/14/2018     ADVANCE DIRECTIVE PLANNING Q5 YRS  04/26/2018     CMP Q1 YR  08/01/2018     HEMOGLOBIN Q1 YR  08/01/2018     FALL RISK ASSESSMENT  08/01/2018     LIPID MONITORING Q1 YEAR  08/01/2018     MICROALBUMIN Q1 YEAR  08/01/2018     PHQ-2 Q1 YR  08/01/2018     INFLUENZA VACCINE (1) 09/01/2018     PNEUMOCOCCAL  Completed     AORTIC ANEURYSM SCREENING (SYSTEM ASSIGNED)  Completed     BP Readings from Last 3 Encounters:   09/11/18 122/64   08/08/17 126/58   08/01/17 145/71    Wt Readings from Last 3 Encounters:   09/11/18 136 lb 12 oz (62 kg)   08/01/17 141 lb 8 oz (64.2 kg)   07/25/16 135 lb 4 oz (61.3 kg)                  Patient Active Problem List   Diagnosis     HYPERLIPIDEMIA LDL GOAL <130     Advanced directives, counseling/discussion     Closed kidney laceration     Benign prostatic hyperplasia with nocturia     CKD (chronic kidney disease) stage 3, GFR 30-59 ml/min     Benign hypertension with CKD (chronic kidney disease) stage III     Past Surgical History:   Procedure Laterality Date     HC COLONOSCOPY THRU STOMA, DIAGNOSTIC  2/5/2008    Normal     HC REMOVAL OF TONSILS,<11 Y/O  1949     STRESS ECHO (METRO)  1/06    passed       Social History   Substance Use Topics     Smoking status: Never Smoker     Smokeless tobacco: Never Used      "Alcohol use No     Family History   Problem Relation Age of Onset     Hypertension Paternal Grandmother      C.A.D. Brother      C.A.D. Brother      Diabetes Brother      Diabetes Brother      Cerebrovascular Disease Paternal Grandfather      Allergies Other      neice     Blood Disease Brother      low hgb         Current Outpatient Prescriptions   Medication Sig Dispense Refill     ASPIRIN 81 MG OR TABS 1 tab po QD (Once per day) 100 3     lisinopril (PRINIVIL/ZESTRIL) 10 MG tablet Take 1 tablet (10 mg) by mouth daily 90 tablet 3     simvastatin (ZOCOR) 40 MG tablet Take 1 tablet (40 mg) by mouth At Bedtime For cholesterol 90 tablet 3     [DISCONTINUED] lisinopril (PRINIVIL/ZESTRIL) 10 MG tablet Take 1 tablet (10 mg) by mouth daily 90 tablet 3     [DISCONTINUED] simvastatin (ZOCOR) 40 MG tablet Take 1 tablet (40 mg) by mouth At Bedtime For cholesterol 90 tablet 3     Allergies   Allergen Reactions     No Known Allergies      Recent Labs   Lab Test  08/01/17   0844  08/04/16   0754  07/25/16   1244  05/19/15   1021   04/26/13   0955   LDL  69   --   71  68   < >  93   HDL  82   --   74  82   < >  88   TRIG  60   --   48  71   < >  69   ALT  19   --   27   --    --   27   CR  1.24  1.30*  1.19   --    < >  1.16   GFRESTIMATED  57*  54*  60*   --    < >  63   GFRESTBLACK  69  66  73   --    < >  76   POTASSIUM  5.2  4.4  5.7*   --    < >  4.0   TSH   --    --    --    --    --   2.35    < > = values in this interval not displayed.            Review of Systems   ROS: 10 point ROS neg other than the symptoms noted above in the HPI.     OBJECTIVE:   /64  Pulse 72  Temp 98.5  F (36.9  C) (Oral)  Ht 6' 1.7\" (1.872 m)  Wt 136 lb 12 oz (62 kg)  SpO2 98%  BMI 17.7 kg/m2 Estimated body mass index is 17.7 kg/(m^2) as calculated from the following:    Height as of this encounter: 6' 1.7\" (1.872 m).    Weight as of this encounter: 136 lb 12 oz (62 kg).  Physical Exam  GENERAL: healthy, alert and no distress  EYES: " Eyes grossly normal to inspection, PERRL and conjunctivae and sclerae normal  HENT: ear canals and TM's normal, nose and mouth without ulcers or lesions  NECK: no adenopathy, no asymmetry, masses, or scars and thyroid normal to palpation  RESP: lungs clear to auscultation - no rales, rhonchi or wheezes  CV: regular rate and rhythm, normal S1 S2, no S3 or S4, no murmur, click or rub, no peripheral edema and peripheral pulses strong  ABDOMEN: soft, nontender, no hepatosplenomegaly, no masses and bowel sounds normal  MS: no gross musculoskeletal defects noted, no edema  SKIN: no suspicious lesions or rashes  NEURO: Normal strength and tone, mentation intact and speech normal  PSYCH: mentation appears normal, affect normal/bright    Diagnostic Test Results:  none     ASSESSMENT / PLAN:     1. Medicare annual wellness visit, subsequent  He would like to complete FIT test instead of colonoscopy.   Fasting labs today.     2. Essential hypertension with goal blood pressure less than 140/90  Controlled   He was previously on HCTZ, stopped 5/2015. Continues on lisinopril 10 mg.  - lisinopril (PRINIVIL/ZESTRIL) 10 MG tablet; Take 1 tablet (10 mg) by mouth daily  Dispense: 90 tablet; Refill: 3     3. CKD (chronic kidney disease) stage 3, GFR 30-59 ml/min  Stable.   - Comprehensive metabolic panel  - Albumin Random Urine Quantitative  - Hemoglobin     4. Benign prostatic hyperplasia, presence of lower urinary tract symptoms unspecified  Stable.  He continues to have nocturia once nightly, that has been stable since going off HCTZ in 2015.He is not interested in medication at this time.      5. Hyperlipidemia LDL goal <130  Stable. Continues simvastatin 40 mg.  - simvastatin (ZOCOR) 40 MG tablet; Take 1 tablet (40 mg) by mouth At Bedtime For cholesterol  Dispense: 90 tablet; Refill: 3  - Lipid panel reflex to direct LDL     6. Colon cancer screening    FIT test     End of Life Planning:  Patient currently has an advanced  "directive: No.  I have verified the patient's ablity to prepare an advanced directive/make health care decisions.  Literature was provided to assist patient in preparing an advanced directive.    COUNSELING:  Reviewed preventive health counseling, as reflected in patient instructions    BP Readings from Last 1 Encounters:   09/11/18 122/64     Estimated body mass index is 17.7 kg/(m^2) as calculated from the following:    Height as of this encounter: 6' 1.7\" (1.872 m).    Weight as of this encounter: 136 lb 12 oz (62 kg).           reports that he has never smoked. He has never used smokeless tobacco.      Appropriate preventive services were discussed with this patient, including applicable screening as appropriate for cardiovascular disease, diabetes, osteopenia/osteoporosis, and glaucoma.  As appropriate for age/gender, discussed screening for colorectal cancer, prostate cancer, breast cancer, and cervical cancer. Checklist reviewing preventive services available has been given to the patient.    Reviewed patients plan of care and provided an AVS. The Intermediate Care Plan ( asthma action plan, low back pain action plan, and migraine action plan) for Chavez meets the Care Plan requirement. This Care Plan has been established and reviewed with the Patient.    Counseling Resources:  ATP IV Guidelines  Pooled Cohorts Equation Calculator  Breast Cancer Risk Calculator  FRAX Risk Assessment  ICSI Preventive Guidelines  Dietary Guidelines for Americans, 2010  [employer] My[employer]ate  ASA Prophylaxis  Lung CA Screening    Fern Sheppard MD, MD  [facility]  Answers for HPI/ROS submitted by the patient on 9/11/2018   PHQ-2 Score: 0    "

## 2018-09-11 NOTE — NURSING NOTE
Screening Questionnaire for Adult Immunization    Are you sick today?   No   Do you have allergies to medications, food, a vaccine component or latex?   No   Have you ever had a serious reaction after receiving a vaccination?   No   Do you have a long-term health problem with heart disease, lung disease, asthma, kidney disease, metabolic disease (e.g. diabetes), anemia, or other blood disorder?   No   Do you have cancer, leukemia, HIV/AIDS, or any other immune system problem?   No   In the past 3 months, have you taken medications that affect  your immune system, such as prednisone, other steroids, or anticancer drugs; drugs for the treatment of rheumatoid arthritis, Crohn s disease, or psoriasis; or have you had radiation treatments?   No   Have you had a seizure, or a brain or other nervous system problem?   No   During the past year, have you received a transfusion of blood or blood     products, or been given immune (gamma) globulin or antiviral drug?   No   For women: Are you pregnant or is there a chance you could become        pregnant during the next month?   No   Have you received any vaccinations in the past 4 weeks?   No     Immunization questionnaire answers were all negative.        Per orders of Fern Burgos, injection of Flu and Tdap given by Marielos Schwarz. Patient instructed to remain in clinic for 15 minutes afterwards, and to report any adverse reaction to me immediately.       Screening performed by Marielos Schwarz on 9/11/2018 at 10:28 AM.    Injectable Influenza Immunization Documentation    1.  Is the person to be vaccinated sick today?   No    2. Does the person to be vaccinated have an allergy to a component   of the vaccine?   No  Egg Allergy Algorithm Link    3. Has the person to be vaccinated ever had a serious reaction   to influenza vaccine in the past?   No    4. Has the person to be vaccinated ever had Guillain-Barré syndrome?   No    Form completed by Marielos Schwarz MA

## 2018-09-13 PROCEDURE — 82274 ASSAY TEST FOR BLOOD FECAL: CPT | Performed by: FAMILY MEDICINE

## 2018-09-16 LAB — HEMOCCULT STL QL IA: NEGATIVE

## 2018-09-17 DIAGNOSIS — Z12.11 SCREEN FOR COLON CANCER: ICD-10-CM

## 2018-09-17 NOTE — LETTER
September 18, 2018      Chavez Perkins  [address] [facility] [address]        Dear ,    We are writing to inform you of your test results.    Normal results.     Resulted Orders   Fecal colorectal cancer screen FIT   Result Value Ref Range    Occult Blood Scn FIT Negative NEG^Negative       If you have any questions or concerns, please call the clinic at the number listed above.       Sincerely,    Fern Sheppard MD/nr

## 2019-10-15 ENCOUNTER — OFFICE VISIT (OUTPATIENT)
Dept: FAMILY MEDICINE | Facility: CLINIC | Age: 74
End: 2019-10-15
Payer: COMMERCIAL

## 2019-10-15 VITALS
BODY MASS INDEX: 18.55 KG/M2 | DIASTOLIC BLOOD PRESSURE: 60 MMHG | TEMPERATURE: 97.9 F | HEART RATE: 80 BPM | HEIGHT: 73 IN | SYSTOLIC BLOOD PRESSURE: 132 MMHG | RESPIRATION RATE: 16 BRPM | OXYGEN SATURATION: 99 % | WEIGHT: 140 LBS

## 2019-10-15 DIAGNOSIS — Z23 NEED FOR PROPHYLACTIC VACCINATION AND INOCULATION AGAINST INFLUENZA: ICD-10-CM

## 2019-10-15 DIAGNOSIS — E78.5 HYPERLIPIDEMIA LDL GOAL <130: ICD-10-CM

## 2019-10-15 DIAGNOSIS — N40.1 BENIGN PROSTATIC HYPERPLASIA WITH NOCTURIA: ICD-10-CM

## 2019-10-15 DIAGNOSIS — R35.1 BENIGN PROSTATIC HYPERPLASIA WITH NOCTURIA: ICD-10-CM

## 2019-10-15 DIAGNOSIS — I10 ESSENTIAL HYPERTENSION WITH GOAL BLOOD PRESSURE LESS THAN 140/90: ICD-10-CM

## 2019-10-15 DIAGNOSIS — N18.30 CKD (CHRONIC KIDNEY DISEASE) STAGE 3, GFR 30-59 ML/MIN (H): ICD-10-CM

## 2019-10-15 DIAGNOSIS — Z00.00 ENCOUNTER FOR MEDICARE ANNUAL WELLNESS EXAM: Primary | ICD-10-CM

## 2019-10-15 LAB
ALBUMIN SERPL-MCNC: 4.1 G/DL (ref 3.4–5)
ALP SERPL-CCNC: 68 U/L (ref 40–150)
ALT SERPL W P-5'-P-CCNC: 23 U/L (ref 0–70)
ANION GAP SERPL CALCULATED.3IONS-SCNC: 3 MMOL/L (ref 3–14)
AST SERPL W P-5'-P-CCNC: 18 U/L (ref 0–45)
BILIRUB SERPL-MCNC: 0.9 MG/DL (ref 0.2–1.3)
BUN SERPL-MCNC: 24 MG/DL (ref 7–30)
CALCIUM SERPL-MCNC: 9 MG/DL (ref 8.5–10.1)
CHLORIDE SERPL-SCNC: 109 MMOL/L (ref 94–109)
CHOLEST SERPL-MCNC: 185 MG/DL
CO2 SERPL-SCNC: 28 MMOL/L (ref 20–32)
CREAT SERPL-MCNC: 1.26 MG/DL (ref 0.66–1.25)
CREAT UR-MCNC: 207 MG/DL
GFR SERPL CREATININE-BSD FRML MDRD: 55 ML/MIN/{1.73_M2}
GLUCOSE SERPL-MCNC: 100 MG/DL (ref 70–99)
HDLC SERPL-MCNC: 75 MG/DL
HGB BLD-MCNC: 15.1 G/DL (ref 13.3–17.7)
LDLC SERPL CALC-MCNC: 93 MG/DL
MICROALBUMIN UR-MCNC: 19 MG/L
MICROALBUMIN/CREAT UR: 9.32 MG/G CR (ref 0–17)
NONHDLC SERPL-MCNC: 110 MG/DL
POTASSIUM SERPL-SCNC: 4.4 MMOL/L (ref 3.4–5.3)
PROT SERPL-MCNC: 6.9 G/DL (ref 6.8–8.8)
SODIUM SERPL-SCNC: 140 MMOL/L (ref 133–144)
TRIGL SERPL-MCNC: 84 MG/DL

## 2019-10-15 PROCEDURE — 80053 COMPREHEN METABOLIC PANEL: CPT | Performed by: FAMILY MEDICINE

## 2019-10-15 PROCEDURE — 36415 COLL VENOUS BLD VENIPUNCTURE: CPT | Performed by: FAMILY MEDICINE

## 2019-10-15 PROCEDURE — 82043 UR ALBUMIN QUANTITATIVE: CPT | Performed by: FAMILY MEDICINE

## 2019-10-15 PROCEDURE — 90662 IIV NO PRSV INCREASED AG IM: CPT | Performed by: FAMILY MEDICINE

## 2019-10-15 PROCEDURE — 80061 LIPID PANEL: CPT | Performed by: FAMILY MEDICINE

## 2019-10-15 PROCEDURE — G0008 ADMIN INFLUENZA VIRUS VAC: HCPCS | Performed by: FAMILY MEDICINE

## 2019-10-15 PROCEDURE — 85018 HEMOGLOBIN: CPT | Performed by: FAMILY MEDICINE

## 2019-10-15 PROCEDURE — 99397 PER PM REEVAL EST PAT 65+ YR: CPT | Mod: 25 | Performed by: FAMILY MEDICINE

## 2019-10-15 RX ORDER — LISINOPRIL 10 MG/1
10 TABLET ORAL DAILY
Qty: 90 TABLET | Refills: 3 | Status: SHIPPED | OUTPATIENT
Start: 2019-10-15 | End: 2020-10-27

## 2019-10-15 RX ORDER — SIMVASTATIN 40 MG
40 TABLET ORAL AT BEDTIME
Qty: 90 TABLET | Refills: 3 | Status: SHIPPED | OUTPATIENT
Start: 2019-10-15 | End: 2020-10-27

## 2019-10-15 ASSESSMENT — ENCOUNTER SYMPTOMS
DYSURIA: 0
NERVOUS/ANXIOUS: 0
SHORTNESS OF BREATH: 0
NAUSEA: 0
WEAKNESS: 0
PARESTHESIAS: 0
FREQUENCY: 1
FEVER: 0
HEADACHES: 0
EYE PAIN: 0
ARTHRALGIAS: 0
JOINT SWELLING: 0
HEARTBURN: 0
MYALGIAS: 0
PALPITATIONS: 0
SORE THROAT: 1

## 2019-10-15 ASSESSMENT — ACTIVITIES OF DAILY LIVING (ADL): CURRENT_FUNCTION: NO ASSISTANCE NEEDED

## 2019-10-15 ASSESSMENT — MIFFLIN-ST. JEOR: SCORE: 1423.92

## 2019-10-15 NOTE — PROGRESS NOTES
"SUBJECTIVE:   Chavez Perkins is a 74 year old male who presents for Preventive Visit.    Are you in the first 12 months of your Medicare coverage?  No    Healthy Habits:     In general, how would you rate your overall health?  Good    Frequency of exercise:  1 day/week    Duration of exercise:  Less than 15 minutes    Do you usually eat at least 4 servings of fruit and vegetables a day, include whole grains    & fiber and avoid regularly eating high fat or \"junk\" foods?  Yes    Taking medications regularly:  Yes    Medication side effects:  None    Ability to successfully perform activities of daily living:  No assistance needed    Home Safety:  No safety concerns identified    Hearing Impairment:  Difficulty following a conversation in a noisy restaurant or crowded room, feel that people are mumbling or not speaking clearly and need to ask people to speak up or repeat themselves    In the past 6 months, have you been bothered by leaking of urine?  No    In general, how would you rate your overall mental or emotional health?  Good      PHQ-2 Total Score: 0    Additional concerns today:  No    Do you feel safe in your environment? Yes    Do you have a Health Care Directive? No: Advance care planning was reviewed with patient; patient declined at this time.    Fall risk  Fallen 2 or more times in the past year?: No  Any fall with injury in the past year?: No    Cognitive Screening   1) Repeat 3 items (Leader, Season, Table)    2) Clock draw: NORMAL  3) 3 item recall: Recalls 2 objects   Results: NORMAL clock, 1-2 items recalled: COGNITIVE IMPAIRMENT LESS LIKELY    Mini-CogTM Copyright LAWRENCE Fitzpatrick. Licensed by the author for use in [facility]; reprinted with permission (jess@.[facility]). All rights reserved.      Do you have sleep apnea, excessive snoring or daytime drowsiness?: no    Reviewed and updated as needed this visit by clinical staff  Tobacco  Allergies  Meds         Reviewed and updated as needed " this visit by Provider        Social History     Tobacco Use     Smoking status: Never Smoker     Smokeless tobacco: Never Used   Substance Use Topics     Alcohol use: No     If you drink alcohol do you typically have >3 drinks per day or >7 drinks per week? No    Alcohol Use 10/15/2019   Prescreen: >3 drinks/day or >7 drinks/week? No   Prescreen: >3 drinks/day or >7 drinks/week? -   No flowsheet data found.      Current providers sharing in care for this patient include:    Patient Care Team:  Fern Sheppard MD as PCP - General (Family Practice)  Fern Sheppard MD as Assigned PCP    The following health maintenance items are reviewed in Epic and correct as of today:  Health Maintenance   Topic Date Due     ZOSTER IMMUNIZATION (1 of 2) 09/18/1994     ADVANCE CARE PLANNING  04/26/2018     PHQ-2  01/01/2019     INFLUENZA VACCINE (1) 09/01/2019     MEDICARE ANNUAL WELLNESS VISIT  09/11/2019     BMP  09/11/2019     CMP  09/11/2019     LIPID  09/11/2019     MICROALBUMIN  09/11/2019     FALL RISK ASSESSMENT  09/11/2019     FIT  09/13/2019     DTAP/TDAP/TD IMMUNIZATION (4 - Td) 09/11/2028     PNEUMOCOCCAL IMMUNIZATION 65+ LOW/MEDIUM RISK  Completed     AORTIC ANEURYSM SCREENING (SYSTEM ASSIGNED)  Completed     IPV IMMUNIZATION  Aged Out     MENINGITIS IMMUNIZATION  Aged Out     BP Readings from Last 3 Encounters:   10/15/19 132/60   09/11/18 122/64   08/08/17 126/58    Wt Readings from Last 3 Encounters:   10/15/19 63.5 kg (140 lb)   09/11/18 62 kg (136 lb 12 oz)   08/01/17 64.2 kg (141 lb 8 oz)                  Patient Active Problem List   Diagnosis     HYPERLIPIDEMIA LDL GOAL <130     Advanced directives, counseling/discussion     Closed kidney laceration     Benign prostatic hyperplasia with nocturia     CKD (chronic kidney disease) stage 3, GFR 30-59 ml/min (H)     Benign hypertension with CKD (chronic kidney disease) stage III (H)     Past Surgical History:   Procedure Laterality Date     HC COLONOSCOPY THRU  STOMA, DIAGNOSTIC  2/5/2008    Normal     HC REMOVAL OF TONSILS,<13 Y/O  1950     STRESS ECHO ([facility])  1/06    passed       Social History     Tobacco Use     Smoking status: Never Smoker     Smokeless tobacco: Never Used   Substance Use Topics     Alcohol use: No     Family History   Problem Relation Age of Onset     Hypertension Paternal Grandmother      C.A.D. Brother      CATARINO. Brother      Diabetes Brother      Diabetes Brother      Cerebrovascular Disease Paternal Grandfather      Allergies Other         neice     Blood Disease Brother         low hgb         Current Outpatient Medications   Medication Sig Dispense Refill     ASPIRIN 81 MG OR TABS 1 tab po QD (Once per day) 100 3     lisinopril (PRINIVIL/ZESTRIL) 10 MG tablet Take 1 tablet (10 mg) by mouth daily 90 tablet 3     simvastatin (ZOCOR) 40 MG tablet Take 1 tablet (40 mg) by mouth At Bedtime For cholesterol 90 tablet 3     Allergies   Allergen Reactions     No Known Allergies      Recent Labs   Lab Test 09/11/18  1027 08/01/17  0844  07/25/16  1244  04/26/13  0955   LDL 71 69  --  71   < > 93   HDL 80 82  --  74   < > 88   TRIG 61 60  --  48   < > 69   ALT 27 19  --  27  --  27   CR 1.25 1.24   < > 1.19   < > 1.16   GFRESTIMATED 56* 57*   < > 60*   < > 63   GFRESTBLACK 68 69   < > 73   < > 76   POTASSIUM 5.0 5.2   < > 5.7*   < > 4.0   TSH  --   --   --   --   --  2.35    < > = values in this interval not displayed.          Review of Systems   Constitutional: Negative for fever.   HENT: Positive for sore throat. Negative for hearing loss.    Eyes: Negative for pain and visual disturbance.   Respiratory: Negative for shortness of breath.    Cardiovascular: Negative for palpitations and peripheral edema.   Gastrointestinal: Negative for heartburn and nausea.   Genitourinary: Positive for frequency. Negative for discharge, dysuria, genital sores, impotence and urgency.   Musculoskeletal: Negative for arthralgias, joint swelling and myalgias.   Skin:  "Negative for rash.   Neurological: Negative for weakness, headaches and paresthesias.   Psychiatric/Behavioral: Negative for mood changes. The patient is not nervous/anxious.        OBJECTIVE:   /60 (BP Location: Left arm, Patient Position: Sitting, Cuff Size: Adult Regular)   Pulse 80   Temp 97.9  F (36.6  C) (Oral)   Resp 16   Ht 1.854 m (6' 1\")   Wt 63.5 kg (140 lb)   SpO2 99%   BMI 18.47 kg/m   Estimated body mass index is 18.47 kg/m  as calculated from the following:    Height as of this encounter: 1.854 m (6' 1\").    Weight as of this encounter: 63.5 kg (140 lb).  Physical Exam  GENERAL: healthy, alert and no distress  EYES: Eyes grossly normal to inspection, PERRL and conjunctivae and sclerae normal  HENT: ear canals and TM's normal, nose and mouth without ulcers or lesions  NECK: no adenopathy, no asymmetry, masses, or scars and thyroid normal to palpation  RESP: lungs clear to auscultation - no rales, rhonchi or wheezes  CV: regular rate and rhythm, normal S1 S2, no S3 or S4, no murmur, click or rub, no peripheral edema and peripheral pulses strong  ABDOMEN: soft, nontender, no hepatosplenomegaly, no masses and bowel sounds normal  MS: no gross musculoskeletal defects noted, no edema  SKIN: no suspicious lesions or rashes  NEURO: Normal strength and tone, mentation intact and speech normal  PSYCH: mentation appears normal, affect normal/bright    Diagnostic Test Results:  Labs reviewed in Epic    ASSESSMENT / PLAN:      1. Medicare annual wellness visit, subsequent  He would like to complete FIT test instead of colonoscopy.   Fasting labs today.  I recommended Shingrix vaccine  I recommended influenza vaccine     2. Essential hypertension with goal blood pressure less than 140/90  Controlled  CMP today   He was previously on HCTZ, stopped 5/2015. Continues on lisinopril 10 mg.  - lisinopril (PRINIVIL/ZESTRIL) 10 MG tablet; Take 1 tablet (10 mg) by mouth daily  Dispense: 90 tablet; Refill: " 3     3. CKD (chronic kidney disease) stage 3, GFR 30-59 ml/min  Stable.    - Comprehensive metabolic panel  - Albumin Random Urine Quantitative  - Hemoglobin     4. Benign prostatic hyperplasia, presence of lower urinary tract symptoms unspecified  Stable.  He continues to have nocturia once to three times nightly, that has been stable since going off HCTZ in 2015.He is not interested in medication at this time. Discussed lifestyle measures that can help as well.      5. Hyperlipidemia LDL goal <130  Stable. Continues simvastatin 40 mg.  - simvastatin (ZOCOR) 40 MG tablet; Take 1 tablet (40 mg) by mouth At Bedtime For cholesterol  Dispense: 90 tablet; Refill: 3  - Lipid panel reflex to direct LDL     6. Colon cancer screening     FIT test     I recommended discontinuing ASA as risks appear to outweigh benefits at this point.        Patient Instructions       Patient Education   Personalized Prevention Plan  You are due for the preventive services outlined below.  Your care team is available to assist you in scheduling these services.  If you have already completed any of these items, please share that information with your care team to update in your medical record.  Health Maintenance Due   Topic Date Due     Zoster (Shingles) Vaccine (1 of 2) 09/18/1994     Discuss Advance Care Planning  04/26/2018     PHQ-2  01/01/2019     Flu Vaccine (1) 09/01/2019     Annual Wellness Visit  09/11/2019     Basic Metabolic Panel  09/11/2019     Comprehensive Metabolic Panel  09/11/2019     Cholesterol Lab  09/11/2019     Kidney Microalbumin Urine Test  09/11/2019     FALL RISK ASSESSMENT  09/11/2019     FIT Test  09/13/2019         1) I recommend getting the new shingles vaccine, Shingrix.  You can call your insurance company to find out if this vaccine is covered.  You may also ask our pharmacy to check if your insurance covers Shingrix if given at the pharmacy.  2) I recommend stopping your aspirin, as the risks of taking the  "medication appear to outweigh the benefits.   3) you can use tylenol over-the-counter if needed for pain  4) fill out your advance directive and return it to clinic.           End of Life Planning:  Patient currently has an advanced directive: No.  I have verified the patient's ablity to prepare an advanced directive/make health care decisions.  Literature was provided to assist patient in preparing an advanced directive.    COUNSELING:  Reviewed preventive health counseling, as reflected in patient instructions    Estimated body mass index is 18.47 kg/m  as calculated from the following:    Height as of this encounter: 1.854 m (6' 1\").    Weight as of this encounter: 63.5 kg (140 lb).         reports that he has never smoked. He has never used smokeless tobacco.      Appropriate preventive services were discussed with this patient, including applicable screening as appropriate for cardiovascular disease, diabetes, osteopenia/osteoporosis, and glaucoma.  As appropriate for age/gender, discussed screening for colorectal cancer, prostate cancer, breast cancer, and cervical cancer. Checklist reviewing preventive services available has been given to the patient.    Reviewed patients plan of care and provided an AVS. The Intermediate Care Plan ( asthma action plan, low back pain action plan, and migraine action plan) for Chavez meets the Care Plan requirement. This Care Plan has been established and reviewed with the Patient.    Counseling Resources:  ATP IV Guidelines  Pooled Cohorts Equation Calculator  Breast Cancer Risk Calculator  FRAX Risk Assessment  ICSI Preventive Guidelines  Dietary Guidelines for Americans, 2010  USDA's MyPlate  ASA Prophylaxis  Lung CA Screening    Fern Sheppard MD, MD  [facility]    Identified Health Risks:  "

## 2019-10-15 NOTE — PATIENT INSTRUCTIONS
Patient Education   Personalized Prevention Plan  You are due for the preventive services outlined below.  Your care team is available to assist you in scheduling these services.  If you have already completed any of these items, please share that information with your care team to update in your medical record.  Health Maintenance Due   Topic Date Due     Zoster (Shingles) Vaccine (1 of 2) 09/18/1994     Discuss Advance Care Planning  04/26/2018     PHQ-2  01/01/2019     Flu Vaccine (1) 09/01/2019     Annual Wellness Visit  09/11/2019     Basic Metabolic Panel  09/11/2019     Comprehensive Metabolic Panel  09/11/2019     Cholesterol Lab  09/11/2019     Kidney Microalbumin Urine Test  09/11/2019     FALL RISK ASSESSMENT  09/11/2019     FIT Test  09/13/2019         1) I recommend getting the new shingles vaccine, Shingrix.  You can call your insurance company to find out if this vaccine is covered.  You may also ask our pharmacy to check if your insurance covers Shingrix if given at the pharmacy.  2) I recommend stopping your aspirin, as the risks of taking the medication appear to outweigh the benefits.   3) you can use tylenol over-the-counter if needed for pain  4) fill out your advance directive and return it to clinic.

## 2019-10-15 NOTE — LETTER
October 18, 2019      Chavez Perkins  [address]  [place] [place] [facility] [address]        Dear ,    We are writing to inform you of your test results.    Your lab results are stable. Please let us know if you have any questions.     Resulted Orders   Lipid panel reflex to direct LDL Fasting   Result Value Ref Range    Cholesterol 185 <200 mg/dL    Triglycerides 84 <150 mg/dL      Comment:      Fasting specimen    HDL Cholesterol 75 >39 mg/dL    LDL Cholesterol Calculated 93 <100 mg/dL      Comment:      Desirable:       <100 mg/dl    Non HDL Cholesterol 110 <130 mg/dL   Comprehensive metabolic panel   Result Value Ref Range    Sodium 140 133 - 144 mmol/L    Potassium 4.4 3.4 - 5.3 mmol/L    Chloride 109 94 - 109 mmol/L    Carbon Dioxide 28 20 - 32 mmol/L    Anion Gap 3 3 - 14 mmol/L    Glucose 100 (H) 70 - 99 mg/dL      Comment:      Fasting specimen    Urea Nitrogen 24 7 - 30 mg/dL    Creatinine 1.26 (H) 0.66 - 1.25 mg/dL    GFR Estimate 55 (L) >60 mL/min/[1.73_m2]      Comment:      Non  GFR Calc  Starting 12/18/2018, serum creatinine based estimated GFR (eGFR) will be   calculated using the Chronic Kidney Disease Epidemiology Collaboration   (CKD-EPI) equation.      GFR Estimate If Black 64 >60 mL/min/[1.73_m2]      Comment:       GFR Calc  Starting 12/18/2018, serum creatinine based estimated GFR (eGFR) will be   calculated using the Chronic Kidney Disease Epidemiology Collaboration   (CKD-EPI) equation.      Calcium 9.0 8.5 - 10.1 mg/dL    Bilirubin Total 0.9 0.2 - 1.3 mg/dL    Albumin 4.1 3.4 - 5.0 g/dL    Protein Total 6.9 6.8 - 8.8 g/dL    Alkaline Phosphatase 68 40 - 150 U/L    ALT 23 0 - 70 U/L    AST 18 0 - 45 U/L   Albumin Random Urine Quantitative with Creat Ratio   Result Value Ref Range    Creatinine Urine 207 mg/dL    Albumin Urine mg/L 19 mg/L    Albumin Urine mg/g Cr 9.32 0 - 17 mg/g Cr   Hemoglobin   Result Value Ref Range    Hemoglobin 15.1 13.3 - 17.7 g/dL        If you have any questions or concerns, please call the clinic at the number listed above.       Sincerely,        Fern Sheppard MD/nr

## 2019-11-01 DIAGNOSIS — Z00.00 ENCOUNTER FOR MEDICARE ANNUAL WELLNESS EXAM: ICD-10-CM

## 2019-11-01 PROCEDURE — 82274 ASSAY TEST FOR BLOOD FECAL: CPT | Performed by: FAMILY MEDICINE

## 2019-11-01 NOTE — LETTER
November 12, 2019      Chavez Perkins  [address]  [facility] [address]        Dear ,    We are writing to inform you of your test results.    Normal results.     Resulted Orders   Fecal colorectal cancer screen FIT   Result Value Ref Range    Occult Blood Scn FIT Negative NEG^Negative       If you have any questions or concerns, please call the clinic at the number listed above.       Sincerely,    Fern Sheppard MD/nr

## 2019-11-07 LAB — HEMOCCULT STL QL IA: NEGATIVE

## 2020-09-16 ENCOUNTER — TRANSFERRED RECORDS (OUTPATIENT)
Dept: HEALTH INFORMATION MANAGEMENT | Facility: CLINIC | Age: 75
End: 2020-09-16

## 2020-10-27 ENCOUNTER — OFFICE VISIT (OUTPATIENT)
Dept: FAMILY MEDICINE | Facility: CLINIC | Age: 75
End: 2020-10-27
Payer: COMMERCIAL

## 2020-10-27 VITALS
DIASTOLIC BLOOD PRESSURE: 62 MMHG | HEART RATE: 78 BPM | SYSTOLIC BLOOD PRESSURE: 133 MMHG | WEIGHT: 139 LBS | BODY MASS INDEX: 18.34 KG/M2 | RESPIRATION RATE: 18 BRPM | TEMPERATURE: 98.5 F | OXYGEN SATURATION: 99 %

## 2020-10-27 DIAGNOSIS — N18.30 STAGE 3 CHRONIC KIDNEY DISEASE, UNSPECIFIED WHETHER STAGE 3A OR 3B CKD (H): ICD-10-CM

## 2020-10-27 DIAGNOSIS — R35.1 BENIGN PROSTATIC HYPERPLASIA WITH NOCTURIA: ICD-10-CM

## 2020-10-27 DIAGNOSIS — Z00.00 ENCOUNTER FOR MEDICARE ANNUAL WELLNESS EXAM: Primary | ICD-10-CM

## 2020-10-27 DIAGNOSIS — E78.5 HYPERLIPIDEMIA LDL GOAL <130: ICD-10-CM

## 2020-10-27 DIAGNOSIS — Z23 NEED FOR PROPHYLACTIC VACCINATION AND INOCULATION AGAINST INFLUENZA: ICD-10-CM

## 2020-10-27 DIAGNOSIS — N40.1 BENIGN PROSTATIC HYPERPLASIA WITH NOCTURIA: ICD-10-CM

## 2020-10-27 DIAGNOSIS — Z12.11 COLON CANCER SCREENING: ICD-10-CM

## 2020-10-27 DIAGNOSIS — I10 ESSENTIAL HYPERTENSION WITH GOAL BLOOD PRESSURE LESS THAN 140/90: ICD-10-CM

## 2020-10-27 PROCEDURE — 80053 COMPREHEN METABOLIC PANEL: CPT | Performed by: FAMILY MEDICINE

## 2020-10-27 PROCEDURE — 36415 COLL VENOUS BLD VENIPUNCTURE: CPT | Performed by: FAMILY MEDICINE

## 2020-10-27 PROCEDURE — 99397 PER PM REEVAL EST PAT 65+ YR: CPT | Performed by: FAMILY MEDICINE

## 2020-10-27 PROCEDURE — 99214 OFFICE O/P EST MOD 30 MIN: CPT | Mod: 25 | Performed by: FAMILY MEDICINE

## 2020-10-27 PROCEDURE — 82043 UR ALBUMIN QUANTITATIVE: CPT | Performed by: FAMILY MEDICINE

## 2020-10-27 PROCEDURE — 80061 LIPID PANEL: CPT | Performed by: FAMILY MEDICINE

## 2020-10-27 PROCEDURE — G0008 ADMIN INFLUENZA VIRUS VAC: HCPCS | Performed by: FAMILY MEDICINE

## 2020-10-27 PROCEDURE — 90662 IIV NO PRSV INCREASED AG IM: CPT | Performed by: FAMILY MEDICINE

## 2020-10-27 RX ORDER — LISINOPRIL 10 MG/1
10 TABLET ORAL DAILY
Qty: 90 TABLET | Refills: 3 | Status: SHIPPED | OUTPATIENT
Start: 2020-10-27 | End: 2021-11-05

## 2020-10-27 RX ORDER — SIMVASTATIN 40 MG
40 TABLET ORAL AT BEDTIME
Qty: 90 TABLET | Refills: 3 | Status: SHIPPED | OUTPATIENT
Start: 2020-10-27 | End: 2021-11-05

## 2020-10-27 ASSESSMENT — ENCOUNTER SYMPTOMS
CHILLS: 0
MYALGIAS: 0
DYSURIA: 0
EYE PAIN: 0
HEADACHES: 0
CONSTIPATION: 0
PALPITATIONS: 0
ABDOMINAL PAIN: 0
ARTHRALGIAS: 0
FEVER: 0
WEAKNESS: 0
NAUSEA: 0
NERVOUS/ANXIOUS: 0
DIARRHEA: 0
PARESTHESIAS: 0
HEMATURIA: 0
SHORTNESS OF BREATH: 0
JOINT SWELLING: 0
COUGH: 0
FREQUENCY: 1
HEARTBURN: 0
SORE THROAT: 0
DIZZINESS: 0
HEMATOCHEZIA: 0

## 2020-10-27 ASSESSMENT — ACTIVITIES OF DAILY LIVING (ADL): CURRENT_FUNCTION: NO ASSISTANCE NEEDED

## 2020-10-27 NOTE — LETTER
November 3, 2020      Chavez Perkins  [address]  [facility] [address]        Dear ,    We are writing to inform you of your test results.Your lab results are stable. Please let us know if you have any questions.         Resulted Orders   Lipid panel reflex to direct LDL Fasting   Result Value Ref Range    Cholesterol 178 <200 mg/dL    Triglycerides 64 <150 mg/dL      Comment:      Fasting specimen    HDL Cholesterol 88 >39 mg/dL    LDL Cholesterol Calculated 77 <100 mg/dL      Comment:      Desirable:       <100 mg/dl    Non HDL Cholesterol 90 <130 mg/dL   Comprehensive metabolic panel (BMP + Alb, Alk Phos, ALT, AST, Total. Bili, TP)   Result Value Ref Range    Sodium 141 133 - 144 mmol/L    Potassium 4.3 3.4 - 5.3 mmol/L    Chloride 108 94 - 109 mmol/L    Carbon Dioxide 27 20 - 32 mmol/L    Anion Gap 6 3 - 14 mmol/L    Glucose 99 70 - 99 mg/dL      Comment:      Fasting specimen    Urea Nitrogen 24 7 - 30 mg/dL    Creatinine 1.24 0.66 - 1.25 mg/dL    GFR Estimate 56 (L) >60 mL/min/[1.73_m2]      Comment:      Non  GFR Calc  Starting 12/18/2018, serum creatinine based estimated GFR (eGFR) will be   calculated using the Chronic Kidney Disease Epidemiology Collaboration   (CKD-EPI) equation.      GFR Estimate If Black 65 >60 mL/min/[1.73_m2]      Comment:       GFR Calc  Starting 12/18/2018, serum creatinine based estimated GFR (eGFR) will be   calculated using the Chronic Kidney Disease Epidemiology Collaboration   (CKD-EPI) equation.      Calcium 9.1 8.5 - 10.1 mg/dL    Bilirubin Total 0.8 0.2 - 1.3 mg/dL    Albumin 3.9 3.4 - 5.0 g/dL    Protein Total 6.9 6.8 - 8.8 g/dL    Alkaline Phosphatase 74 40 - 150 U/L    ALT 24 0 - 70 U/L    AST 22 0 - 45 U/L   Albumin Random Urine Quantitative with Creat Ratio   Result Value Ref Range    Creatinine Urine 304 mg/dL    Albumin Urine mg/L 26 mg/L    Albumin Urine mg/g Cr 8.45 0 - 17 mg/g Cr       If you have any questions or  concerns, please call the clinic at the number listed above.       Sincerely,        Fern Sheppard MD, MD

## 2020-10-27 NOTE — PROGRESS NOTES
"SUBJECTIVE:   Chavez Perkins is a 75 year old male who presents for Preventive Visit.    {Split Bill scripting  The purpose of this visit is to discuss your medical history and prevent health problems before you are sick. You may be responsible for a co-pay, coinsurance, or deductible if your visit today includes services such as checking on a sore throat, having an x-ray or lab test, or treating and evaluating a new or existing condition     Patient has been advised of split billing requirements and indicates understanding: Yes     Are you in the first 12 months of your Medicare coverage?  No    Healthy Habits:     In general, how would you rate your overall health?  Good    Frequency of exercise:  1 day/week    Duration of exercise:  Less than 15 minutes    Do you usually eat at least 4 servings of fruit and vegetables a day, include whole grains    & fiber and avoid regularly eating high fat or \"junk\" foods?  Yes    Taking medications regularly:  Yes    Medication side effects:  None    Ability to successfully perform activities of daily living:  No assistance needed    Home Safety:  No safety concerns identified    Hearing Impairment:  Difficulty following a conversation in a noisy restaurant or crowded room and feel that people are mumbling or not speaking clearly    In the past 6 months, have you been bothered by leaking of urine?  No    In general, how would you rate your overall mental or emotional health?  Good      PHQ-2 Total Score: 0    Additional concerns today:  No    Do you feel safe in your environment? Yes    Have you ever done Advance Care Planning? (For example, a Health Directive, POLST, or a discussion with a medical provider or your loved ones about your wishes): Has info at home       Fall risk  Fallen 2 or more times in the past year?: No  Any fall with injury in the past year?: No    Cognitive Screening   1) Repeat 3 items (Leader, Season, Table)    2) Clock draw: NORMAL  3) 3 item recall: " Recalls 3 objects  Results: 3 items recalled: COGNITIVE IMPAIRMENT LESS LIKELY    Mini-CogTM Copyright LWARENCE Fitzpatrick. Licensed by the author for use in [facility]; reprinted with permission ([email or web address]). All rights reserved.      Do you have sleep apnea, excessive snoring or daytime drowsiness?: no    Reviewed and updated as needed this visit by clinical staff  Tobacco  Allergies  Meds              Reviewed and updated as needed this visit by Provider                Social History     Tobacco Use     Smoking status: Never Smoker     Smokeless tobacco: Never Used   Substance Use Topics     Alcohol use: No         Alcohol Use 10/27/2020   Prescreen: >3 drinks/day or >7 drinks/week? No   Prescreen: >3 drinks/day or >7 drinks/week? -         Current providers sharing in care for this patient include:   Patient Care Team:  Fern Sheppard MD as PCP - General (Family Practice)  Fern Shepprad MD as Assigned PCP    The following health maintenance items are reviewed in Epic and correct as of today:  Health Maintenance   Topic Date Due     ZOSTER IMMUNIZATION (1 of 2) 09/18/1994     ADVANCE CARE PLANNING  04/26/2018     INFLUENZA VACCINE (1) 09/01/2020     BMP  10/15/2020     CMP  10/15/2020     LIPID  10/15/2020     MICROALBUMIN  10/15/2020     FALL RISK ASSESSMENT  10/15/2020     COLORECTAL CANCER SCREENING  11/01/2020     MEDICARE ANNUAL WELLNESS VISIT  10/27/2021     DTAP/TDAP/TD IMMUNIZATION (4 - Td) 09/11/2028     PHQ-2  Completed     Pneumococcal Vaccine: 65+ Years  Completed     Pneumococcal Vaccine: Pediatrics (0 to 5 Years) and At-Risk Patients (6 to 64 Years)  Aged Out     IPV IMMUNIZATION  Aged Out     MENINGITIS IMMUNIZATION  Aged Out     HEPATITIS B IMMUNIZATION  Aged Out     BP Readings from Last 3 Encounters:   10/27/20 133/62   10/15/19 132/60   09/11/18 122/64    Wt Readings from Last 3 Encounters:   10/27/20 63 kg (139 lb)   10/15/19 63.5 kg (140 lb)   09/11/18 62 kg (136 lb 12 oz)                   Patient Active Problem List   Diagnosis     HYPERLIPIDEMIA LDL GOAL <130     Advanced directives, counseling/discussion     Closed kidney laceration     Benign prostatic hyperplasia with nocturia     CKD (chronic kidney disease) stage 3, GFR 30-59 ml/min     Benign hypertension with CKD (chronic kidney disease) stage III     Past Surgical History:   Procedure Laterality Date     HC COLONOSCOPY THRU STOMA, DIAGNOSTIC  2/5/2008    Normal     HC REMOVAL OF TONSILS,<11 Y/O  1950     STRESS ECHO (METRO)  1/06    passed       Social History     Tobacco Use     Smoking status: Never Smoker     Smokeless tobacco: Never Used   Substance Use Topics     Alcohol use: No     Family History   Problem Relation Age of Onset     Hypertension Paternal Grandmother      C.A.D. Brother      C.A.D. Brother      Diabetes Brother      Diabetes Brother      Cerebrovascular Disease Paternal Grandfather      Allergies Other         neice     Blood Disease Brother         low hgb         Current Outpatient Medications   Medication Sig Dispense Refill     lisinopril (ZESTRIL) 10 MG tablet Take 1 tablet (10 mg) by mouth daily 90 tablet 3     simvastatin (ZOCOR) 40 MG tablet Take 1 tablet (40 mg) by mouth At Bedtime For cholesterol 90 tablet 3     Allergies   Allergen Reactions     No Known Allergies      Recent Labs   Lab Test 10/15/19  0829 09/11/18  1027 08/01/17  0844 04/26/13  0955 04/26/13  0955   LDL 93 71 69   < > 93   HDL 75 80 82   < > 88   TRIG 84 61 60   < > 69   ALT 23 27 19   < > 27   CR 1.26* 1.25 1.24   < > 1.16   GFRESTIMATED 55* 56* 57*   < > 63   GFRESTBLACK 64 68 69   < > 76   POTASSIUM 4.4 5.0 5.2   < > 4.0   TSH  --   --   --   --  2.35    < > = values in this interval not displayed.          Review of Systems   Constitutional: Negative for chills and fever.   HENT: Negative for congestion, ear pain, hearing loss and sore throat.    Eyes: Negative for pain and visual disturbance.   Respiratory: Negative for  "cough and shortness of breath.    Cardiovascular: Negative for chest pain, palpitations and peripheral edema.   Gastrointestinal: Negative for abdominal pain, constipation, diarrhea, heartburn, hematochezia and nausea.   Genitourinary: Positive for frequency. Negative for discharge, dysuria, genital sores, hematuria, impotence and urgency.   Musculoskeletal: Negative for arthralgias, joint swelling and myalgias.   Skin: Negative for rash.   Neurological: Negative for dizziness, weakness, headaches and paresthesias.   Psychiatric/Behavioral: Negative for mood changes. The patient is not nervous/anxious.      Generally feeling okay. Nephew age 62 just passed away this week. He has had four funerals to go to the past month. He has not been able to go to the library to do the research he normally does for fun since the COVID pandemic started.     OBJECTIVE:   /62   Pulse 78   Temp 98.5  F (36.9  C) (Oral)   Resp 18   Wt 63 kg (139 lb)   SpO2 99%   BMI 18.34 kg/m   Estimated body mass index is 18.34 kg/m  as calculated from the following:    Height as of 10/15/19: 1.854 m (6' 1\").    Weight as of this encounter: 63 kg (139 lb).   Wt Readings from Last 5 Encounters:   10/27/20 63 kg (139 lb)   10/15/19 63.5 kg (140 lb)   09/11/18 62 kg (136 lb 12 oz)   08/01/17 64.2 kg (141 lb 8 oz)   07/25/16 61.3 kg (135 lb 4 oz)      Physical Exam  GENERAL: healthy, alert and no distress  EYES: Eyes grossly normal to inspection, PERRL and conjunctivae and sclerae normal  HENT: ear canals and TM's normal, nose and mouth without ulcers or lesions  NECK: no adenopathy, no asymmetry, masses, or scars and thyroid normal to palpation  RESP: lungs clear to auscultation - no rales, rhonchi or wheezes  CV: regular rate and rhythm, normal S1 S2, no S3 or S4, no murmur, click or rub, no peripheral edema and peripheral pulses strong  ABDOMEN: soft, nontender, no hepatosplenomegaly, no masses and bowel sounds normal  MS: no gross " musculoskeletal defects noted, no edema  SKIN: no suspicious lesions or rashes  NEURO: Normal strength and tone, mentation intact and speech normal  PSYCH: mentation appears normal, affect normal/bright    Diagnostic Test Results:  Labs reviewed in Epic    ASSESSMENT / PLAN:      1. Medicare annual wellness visit, subsequent  He would like to complete FIT test instead of colonoscopy.   Fasting labs today.  I recommended Shingrix vaccine  I recommended influenza vaccine     2. Essential hypertension with goal blood pressure less than 140/90  Controlled  CMP today   He was previously on HCTZ, stopped 5/2015. Continues on lisinopril 10 mg.  - lisinopril (PRINIVIL/ZESTRIL) 10 MG tablet; Take 1 tablet (10 mg) by mouth daily  Dispense: 90 tablet; Refill: 3     3. CKD (chronic kidney disease) stage 3, GFR 30-59 ml/min  Stable.    - Comprehensive metabolic panel  - Albumin Random Urine Quantitative       4. Benign prostatic hyperplasia, presence of lower urinary tract symptoms unspecified  Stable.  He continues to have nocturia once to three times nightly, that has been stable since going off HCTZ in 2015.He is not interested in medication at this time. Discussed lifestyle measures that can help as well.      5. Hyperlipidemia LDL goal <130  Stable. Continues simvastatin 40 mg.  - simvastatin (ZOCOR) 40 MG tablet; Take 1 tablet (40 mg) by mouth At Bedtime For cholesterol  Dispense: 90 tablet; Refill: 3  - Lipid panel reflex to direct LDL     6. Colon cancer screening     FIT test     He denied feeling depressed today. I let him know that we have a therapist available and to reach out if he needs help. He has not looked into going back to the library. Discussed with him some libraries are open and encouraged him to reach out and find out if he can go back to do his research.             Patient has been advised of split billing requirements and indicates understanding: Yes  COUNSELING:  Reviewed preventive health counseling,  "as reflected in patient instructions    Estimated body mass index is 18.34 kg/m  as calculated from the following:    Height as of 10/15/19: 1.854 m (6' 1\").    Weight as of this encounter: 63 kg (139 lb).    Weight management plan noted, stable and monitoring    He reports that he has never smoked. He has never used smokeless tobacco.      Appropriate preventive services were discussed with this patient, including applicable screening as appropriate for cardiovascular disease, diabetes, osteopenia/osteoporosis, and glaucoma.  As appropriate for age/gender, discussed screening for colorectal cancer, prostate cancer, breast cancer, and cervical cancer. Checklist reviewing preventive services available has been given to the patient.    Reviewed patients plan of care and provided an AVS. The Intermediate Care Plan ( asthma action plan, low back pain action plan, and migraine action plan) for Chavez meets the Care Plan requirement. This Care Plan has been established and reviewed with the Patient.    Counseling Resources:  ATP IV Guidelines  Pooled Cohorts Equation Calculator  Breast Cancer Risk Calculator  Breast Cancer: Medication to Reduce Risk  FRAX Risk Assessment  ICSI Preventive Guidelines  Dietary Guidelines for Americans, 2010  USDA's MyPlate  ASA Prophylaxis  Lung CA Screening    Fern Sheppard MD   [facility]    Identified Health Risks:    He is at risk for lack of exercise and has been provided with information to increase physical activity for the benefit of his well-being.  The patient was provided with written information regarding signs of hearing loss.  "

## 2020-10-27 NOTE — PROGRESS NOTES
"SUBJECTIVE:   Chavez Perkins is a 75 year old male who presents for Preventive Visit.    {Split Bill scripting  The purpose of this visit is to discuss your medical history and prevent health problems before you are sick. You may be responsible for a co-pay, coinsurance, or deductible if your visit today includes services such as checking on a sore throat, having an x-ray or lab test, or treating and evaluating a new or existing condition :464521}  Patient has been advised of split billing requirements and indicates understanding: {Yes and No:106966}   Are you in the first 12 months of your Medicare coverage?  { :945817::\"No\"}    HPI  Do you feel safe in your environment? { :126260}    Have you ever done Advance Care Planning? (For example, a Health Directive, POLST, or a discussion with a medical provider or your loved ones about your wishes): { :756842}    {Hearing Test Done (Optional):337287}  Fall risk  { :258560}  {If any of the above assessments are answered yes, consider ordering appropriate referrals (Optional):418862::\"click delete button to remove this line now\"}  Cognitive Screening { :051857}    {Do you have sleep apnea, excessive snoring or daytime drowsiness? (Optional):413755}    Reviewed and updated as needed this visit by clinical staff                 Reviewed and updated as needed this visit by Provider                Social History     Tobacco Use     Smoking status: Never Smoker     Smokeless tobacco: Never Used   Substance Use Topics     Alcohol use: No     {Rooming Staff- Complete this question if Prescreen response is not shown below for today's visit. If you drink alcohol do you typically have >3 drinks per day or >7 drinks per week? (Optional):933799}    Alcohol Use 10/15/2019   Prescreen: >3 drinks/day or >7 drinks/week? No   Prescreen: >3 drinks/day or >7 drinks/week? -   {add AUDIT responses (Optional) (A score of 7 for adult men is an indication of hazardous drinking; a score of 8 or " "more is an indication of an alcohol use disorder.  A score of 7 or more for adult women is an indication of hazardous drinking or an alchohol use disorder):389871}    {Outside tests to abstract? :710581}    {additional problems to add (Optional):858525}    Current providers sharing in care for this patient include: {Rooming staff:  Please update Care Team in Rooming Activity, refresh this note and then delete this statement}  Patient Care Team:  Fern Sheppard MD as PCP - General (Family Practice)  Fern Sheppard MD as Assigned PCP    The following health maintenance items are reviewed in Epic and correct as of today:  Health Maintenance   Topic Date Due     ZOSTER IMMUNIZATION (1 of 2) 09/18/1994     ADVANCE CARE PLANNING  04/26/2018     PHQ-2  01/01/2020     INFLUENZA VACCINE (1) 09/01/2020     BMP  10/15/2020     CMP  10/15/2020     LIPID  10/15/2020     MICROALBUMIN  10/15/2020     FALL RISK ASSESSMENT  10/15/2020     MEDICARE ANNUAL WELLNESS VISIT  10/15/2020     COLORECTAL CANCER SCREENING  11/01/2020     DTAP/TDAP/TD IMMUNIZATION (4 - Td) 09/11/2028     Pneumococcal Vaccine: 65+ Years  Completed     Pneumococcal Vaccine: Pediatrics (0 to 5 Years) and At-Risk Patients (6 to 64 Years)  Aged Out     IPV IMMUNIZATION  Aged Out     MENINGITIS IMMUNIZATION  Aged Out     HEPATITIS B IMMUNIZATION  Aged Out     {Chronicprobdata (optional):284488}  {Decision Support (Optional):851061}    Review of Systems  {ROS COMP (Optional):366188}    OBJECTIVE:   There were no vitals taken for this visit. Estimated body mass index is 18.47 kg/m  as calculated from the following:    Height as of 10/15/19: 1.854 m (6' 1\").    Weight as of 10/15/19: 63.5 kg (140 lb).  Physical Exam  {Exam (Optional) :651250}    {Diagnostic Test Results (Optional):118119::\"Diagnostic Test Results:\",\"Labs reviewed in Epic\"}    ASSESSMENT / PLAN:   {Diag Picklist:457509}    Patient has been advised of split billing requirements and indicates " "understanding: {YES / NO:439712::\"Yes\"}  COUNSELING:  {Medicare Counselin}    Estimated body mass index is 18.47 kg/m  as calculated from the following:    Height as of 10/15/19: 1.854 m (6' 1\").    Weight as of 10/15/19: 63.5 kg (140 lb).    {Weight Management Plan (ACO) Complete if BMI is abnormal-  Ages 18-64  BMI >24.9.  Age 65+ with BMI <23 or >30 (Optional):501509}    He reports that he has never smoked. He has never used smokeless tobacco.      Appropriate preventive services were discussed with this patient, including applicable screening as appropriate for cardiovascular disease, diabetes, osteopenia/osteoporosis, and glaucoma.  As appropriate for age/gender, discussed screening for colorectal cancer, prostate cancer, breast cancer, and cervical cancer. Checklist reviewing preventive services available has been given to the patient.    Reviewed patients plan of care and provided an AVS. The {CarePlan:440653} for Chavez meets the Care Plan requirement. This Care Plan has been established and reviewed with the {PATIENT, FAMILY MEMBER, CAREGIVER:272374}.    Counseling Resources:  ATP IV Guidelines  Pooled Cohorts Equation Calculator  Breast Cancer Risk Calculator  Breast Cancer: Medication to Reduce Risk  FRAX Risk Assessment  ICSI Preventive Guidelines  Dietary Guidelines for Americans,   [employer]'s MyPlate  ASA Prophylaxis  Lung CA Screening    Fern Sheppard MD, MD  [facility]    Identified Health Risks:  "

## 2020-10-27 NOTE — PATIENT INSTRUCTIONS
Patient Education   Personalized Prevention Plan  You are due for the preventive services outlined below.  Your care team is available to assist you in scheduling these services.  If you have already completed any of these items, please share that information with your care team to update in your medical record.  Health Maintenance Due   Topic Date Due     Zoster (Shingles) Vaccine (1 of 2) 09/18/1994     Discuss Advance Care Planning  04/26/2018     PHQ-2  01/01/2020     Flu Vaccine (1) 09/01/2020     Basic Metabolic Panel  10/15/2020     Comprehensive Metabolic Panel  10/15/2020     Cholesterol Lab  10/15/2020     Kidney Microalbumin Urine Test  10/15/2020     FALL RISK ASSESSMENT  10/15/2020     Annual Wellness Visit  10/15/2020     Colorectal Cancer Screening  11/01/2020     I recommend getting the new shingles vaccine, Shingrix.  You can call your insurance company to find out if this vaccine is covered.  You may also ask our pharmacy to check if your insurance covers Shingrix if given at the pharmacy.        Patient Education   Personalized Prevention Plan  You are due for the preventive services outlined below.  Your care team is available to assist you in scheduling these services.  If you have already completed any of these items, please share that information with your care team to update in your medical record.  Health Maintenance Due   Topic Date Due     Zoster (Shingles) Vaccine (1 of 2) 09/18/1994     Discuss Advance Care Planning  04/26/2018     Flu Vaccine (1) 09/01/2020     Basic Metabolic Panel  10/15/2020     Comprehensive Metabolic Panel  10/15/2020     Cholesterol Lab  10/15/2020     Kidney Microalbumin Urine Test  10/15/2020     FALL RISK ASSESSMENT  10/15/2020     Colorectal Cancer Screening  11/01/2020       Exercise for a Healthier Heart     Exercise with a friend. When activity is fun, you're more likely to stick with it.   You may wonder how you can improve the health of your heart. If  you re thinking about exercise, you re on the right track. You don t need to become an athlete, but you do need a certain amount of brisk exercise to help strengthen your heart. If you have been diagnosed with a heart condition, your doctor may recommend exercise to help stabilize your condition. To help make exercise a habit, choose safe, fun activities.  Be sure to check with your healthcare provider before starting an exercise program.  Why exercise?  Exercising regularly offers many healthy rewards. It can help you do all of the following:    Improve your blood cholesterol level to help prevent further heart trouble    Lower your blood pressure to help prevent a stroke or heart attack    Control diabetes, or reduce your risk of getting this disease    Improve your heart and lung function    Reach and maintain a healthy weight    Make your muscles stronger and more limber so you can stay active    Prevent falls and fractures by slowing the loss of bone mass (osteoporosis)    Manage stress better    Reduce your blood pressure    Improve your sense of self and your body image  Exercise tips  Ease into your routine. Set small goals. Then build on them.  Exercise on most days. Aim for a total of 150 or more minutes of moderate to  vigorous intensity activity each week. Consider 40 minutes, 3 to 4 times a week. For best results, activity should last for 40 minutes on average. It is OK to work up to the 40 minute period over time. Examples of moderate-intensity activity is walking 1 mile in 15 minutes or 30 to 45 minutes of yard work.  Step up your daily activity level. Along with your exercise program, try being more active throughout the day. Walk instead of drive. Do more household tasks or yard work.  Choose one or more activities you enjoy. Walking is one of the easiest things you can do. You can also try swimming, riding a bike, dancing, or taking an exercise class.  Stop exercising and call your doctor if  you:    Have chest pain or feel dizzy or lightheaded    Feel burning, tightness, pressure, or heaviness in your chest, neck, shoulders, back, or arms    Have unusual shortness of breath    Have increased joint or muscle pain    Have palpitations or an irregular heartbeat  Date Last Reviewed: 5/1/2016 2000-2019 [employer]. [address] [place] [id number]. All rights reserved. This information is not intended as a substitute for professional medical care. Always follow your healthcare professional's instructions.          Signs of Hearing Loss     Hearing much better with one ear can be a sign of hearing loss.     Hearing loss is a problem shared by many people. In fact, it is one of the most common health conditions, particularly as people age. Most people over age 65 have some hearing loss, and by age 80, almost everyone does. Because hearing loss usually occurs slowly over the years, you may not realize your hearing ability has gotten worse.  Have your hearing checked  Contact your healthcare provider if you:    Have to strain to hear normal conversation    Have to watch other people s faces very carefully to follow what they re saying    Need to ask people to repeat what they ve said    Often misunderstand what people are saying    Turn the volume of the television or radio up so high that others complain    Feel that people are mumbling when they re talking to you    Find that the effort to hear leaves you feeling tired and irritated    Notice, when using the phone, that you hear better with one ear than the other  Date Last Reviewed: 12/1/2016 2000-2019 [employer]. [address] [place] [address]. All rights reserved. This information is not intended as a substitute for professional medical care. Always follow your healthcare professional's instructions.

## 2020-10-28 LAB
ALBUMIN SERPL-MCNC: 3.9 G/DL (ref 3.4–5)
ALP SERPL-CCNC: 74 U/L (ref 40–150)
ALT SERPL W P-5'-P-CCNC: 24 U/L (ref 0–70)
ANION GAP SERPL CALCULATED.3IONS-SCNC: 6 MMOL/L (ref 3–14)
AST SERPL W P-5'-P-CCNC: 22 U/L (ref 0–45)
BILIRUB SERPL-MCNC: 0.8 MG/DL (ref 0.2–1.3)
BUN SERPL-MCNC: 24 MG/DL (ref 7–30)
CALCIUM SERPL-MCNC: 9.1 MG/DL (ref 8.5–10.1)
CHLORIDE SERPL-SCNC: 108 MMOL/L (ref 94–109)
CHOLEST SERPL-MCNC: 178 MG/DL
CO2 SERPL-SCNC: 27 MMOL/L (ref 20–32)
CREAT SERPL-MCNC: 1.24 MG/DL (ref 0.66–1.25)
GFR SERPL CREATININE-BSD FRML MDRD: 56 ML/MIN/{1.73_M2}
GLUCOSE SERPL-MCNC: 99 MG/DL (ref 70–99)
HDLC SERPL-MCNC: 88 MG/DL
LDLC SERPL CALC-MCNC: 77 MG/DL
NONHDLC SERPL-MCNC: 90 MG/DL
POTASSIUM SERPL-SCNC: 4.3 MMOL/L (ref 3.4–5.3)
PROT SERPL-MCNC: 6.9 G/DL (ref 6.8–8.8)
SODIUM SERPL-SCNC: 141 MMOL/L (ref 133–144)
TRIGL SERPL-MCNC: 64 MG/DL

## 2020-10-30 LAB
CREAT UR-MCNC: 304 MG/DL
MICROALBUMIN UR-MCNC: 26 MG/L
MICROALBUMIN/CREAT UR: 8.45 MG/G CR (ref 0–17)

## 2020-11-23 DIAGNOSIS — Z12.11 COLON CANCER SCREENING: ICD-10-CM

## 2020-11-24 PROCEDURE — 82274 ASSAY TEST FOR BLOOD FECAL: CPT | Performed by: FAMILY MEDICINE

## 2020-11-25 LAB — HEMOCCULT STL QL IA: NEGATIVE

## 2021-03-09 ENCOUNTER — IMMUNIZATION (OUTPATIENT)
Dept: NURSING | Facility: CLINIC | Age: 76
End: 2021-03-09
Payer: COMMERCIAL

## 2021-03-09 PROCEDURE — 91300 PR COVID VAC PFIZER DIL RECON 30 MCG/0.3 ML IM: CPT

## 2021-03-09 PROCEDURE — 0001A PR COVID VAC PFIZER DIL RECON 30 MCG/0.3 ML IM: CPT

## 2021-03-30 ENCOUNTER — IMMUNIZATION (OUTPATIENT)
Dept: NURSING | Facility: CLINIC | Age: 76
End: 2021-03-30
Attending: INTERNAL MEDICINE
Payer: COMMERCIAL

## 2021-03-30 PROCEDURE — 91300 PR COVID VAC PFIZER DIL RECON 30 MCG/0.3 ML IM: CPT

## 2021-03-30 PROCEDURE — 0002A PR COVID VAC PFIZER DIL RECON 30 MCG/0.3 ML IM: CPT

## 2021-05-17 ENCOUNTER — TRANSFERRED RECORDS (OUTPATIENT)
Dept: HEALTH INFORMATION MANAGEMENT | Facility: CLINIC | Age: 76
End: 2021-05-17

## 2021-10-23 ENCOUNTER — HEALTH MAINTENANCE LETTER (OUTPATIENT)
Age: 76
End: 2021-10-23

## 2021-11-04 DIAGNOSIS — I10 ESSENTIAL HYPERTENSION WITH GOAL BLOOD PRESSURE LESS THAN 140/90: ICD-10-CM

## 2021-11-04 NOTE — TELEPHONE ENCOUNTER
Patient had to change his physical appointment for a week out more so he is asking for an extension to get him to this appointment

## 2021-11-05 RX ORDER — LISINOPRIL 10 MG/1
10 TABLET ORAL DAILY
Qty: 90 TABLET | Refills: 0 | Status: SHIPPED | OUTPATIENT
Start: 2021-11-05 | End: 2022-01-19

## 2021-11-05 NOTE — TELEPHONE ENCOUNTER
Medication is being filled for 1 time refill only due to:  Patient needs to be seen because it has been more than one year since last visit.     Has appt scheduled with pcp 1/22/22    JEN Parker RN  [facility]

## 2022-01-19 ENCOUNTER — OFFICE VISIT (OUTPATIENT)
Dept: FAMILY MEDICINE | Facility: CLINIC | Age: 77
End: 2022-01-19
Payer: COMMERCIAL

## 2022-01-19 VITALS
HEART RATE: 86 BPM | OXYGEN SATURATION: 99 % | WEIGHT: 144 LBS | DIASTOLIC BLOOD PRESSURE: 66 MMHG | RESPIRATION RATE: 16 BRPM | BODY MASS INDEX: 19.09 KG/M2 | TEMPERATURE: 97.7 F | SYSTOLIC BLOOD PRESSURE: 130 MMHG | HEIGHT: 73 IN

## 2022-01-19 DIAGNOSIS — E78.5 HYPERLIPIDEMIA LDL GOAL <130: ICD-10-CM

## 2022-01-19 DIAGNOSIS — Z11.59 NEED FOR HEPATITIS C SCREENING TEST: ICD-10-CM

## 2022-01-19 DIAGNOSIS — Z00.00 ENCOUNTER FOR MEDICARE ANNUAL WELLNESS EXAM: Primary | ICD-10-CM

## 2022-01-19 DIAGNOSIS — L98.9 SKIN LESION: ICD-10-CM

## 2022-01-19 DIAGNOSIS — Z12.11 SCREENING FOR COLON CANCER: ICD-10-CM

## 2022-01-19 DIAGNOSIS — I10 ESSENTIAL HYPERTENSION WITH GOAL BLOOD PRESSURE LESS THAN 140/90: ICD-10-CM

## 2022-01-19 DIAGNOSIS — R35.1 BENIGN PROSTATIC HYPERPLASIA WITH NOCTURIA: ICD-10-CM

## 2022-01-19 DIAGNOSIS — N40.1 BENIGN PROSTATIC HYPERPLASIA WITH NOCTURIA: ICD-10-CM

## 2022-01-19 DIAGNOSIS — Z23 NEED FOR PROPHYLACTIC VACCINATION AND INOCULATION AGAINST INFLUENZA: ICD-10-CM

## 2022-01-19 DIAGNOSIS — N18.30 STAGE 3 CHRONIC KIDNEY DISEASE, UNSPECIFIED WHETHER STAGE 3A OR 3B CKD (H): ICD-10-CM

## 2022-01-19 LAB — HGB BLD-MCNC: 15.4 G/DL (ref 13.3–17.7)

## 2022-01-19 PROCEDURE — 90471 IMMUNIZATION ADMIN: CPT | Performed by: FAMILY MEDICINE

## 2022-01-19 PROCEDURE — 99214 OFFICE O/P EST MOD 30 MIN: CPT | Mod: 25 | Performed by: FAMILY MEDICINE

## 2022-01-19 PROCEDURE — 36415 COLL VENOUS BLD VENIPUNCTURE: CPT | Performed by: FAMILY MEDICINE

## 2022-01-19 PROCEDURE — 82043 UR ALBUMIN QUANTITATIVE: CPT | Performed by: FAMILY MEDICINE

## 2022-01-19 PROCEDURE — 90662 IIV NO PRSV INCREASED AG IM: CPT | Performed by: FAMILY MEDICINE

## 2022-01-19 PROCEDURE — 85018 HEMOGLOBIN: CPT | Performed by: FAMILY MEDICINE

## 2022-01-19 PROCEDURE — 80061 LIPID PANEL: CPT | Performed by: FAMILY MEDICINE

## 2022-01-19 PROCEDURE — 86803 HEPATITIS C AB TEST: CPT | Performed by: FAMILY MEDICINE

## 2022-01-19 PROCEDURE — 99397 PER PM REEVAL EST PAT 65+ YR: CPT | Mod: 25 | Performed by: FAMILY MEDICINE

## 2022-01-19 PROCEDURE — 80053 COMPREHEN METABOLIC PANEL: CPT | Performed by: FAMILY MEDICINE

## 2022-01-19 RX ORDER — LISINOPRIL 10 MG/1
10 TABLET ORAL DAILY
Qty: 90 TABLET | Refills: 3 | Status: SHIPPED | OUTPATIENT
Start: 2022-01-19 | End: 2022-11-25

## 2022-01-19 RX ORDER — SIMVASTATIN 40 MG
40 TABLET ORAL AT BEDTIME
Qty: 90 TABLET | Refills: 3 | Status: SHIPPED | OUTPATIENT
Start: 2022-01-19 | End: 2022-11-25

## 2022-01-19 ASSESSMENT — ENCOUNTER SYMPTOMS
NAUSEA: 0
HEMATURIA: 0
SHORTNESS OF BREATH: 0
MYALGIAS: 0
COUGH: 0
DYSURIA: 0
JOINT SWELLING: 0
FREQUENCY: 1
HEARTBURN: 0
PARESTHESIAS: 0
HEMATOCHEZIA: 0
DIZZINESS: 0
WEAKNESS: 0
ARTHRALGIAS: 0
ABDOMINAL PAIN: 0
NERVOUS/ANXIOUS: 0
EYE PAIN: 0
CONSTIPATION: 0
SORE THROAT: 0
CHILLS: 0
FEVER: 0
HEADACHES: 0
PALPITATIONS: 0

## 2022-01-19 ASSESSMENT — ACTIVITIES OF DAILY LIVING (ADL): CURRENT_FUNCTION: NO ASSISTANCE NEEDED

## 2022-01-19 ASSESSMENT — MIFFLIN-ST. JEOR: SCORE: 1432.06

## 2022-01-19 NOTE — PROGRESS NOTES
"    He is at risk for falling and has been provided with information to reduce the risk of falling at home.    SUBJECTIVE:   Chavez Perkins is a 76 year old male who presents for Preventive Visit.      Patient has been advised of split billing requirements and indicates understanding: Yes  Are you in the first 12 months of your Medicare coverage?  No    Healthy Habits:     In general, how would you rate your overall health?  Good    Frequency of exercise:  1 day/week    Duration of exercise:  Less than 15 minutes    Do you usually eat at least 4 servings of fruit and vegetables a day, include whole grains    & fiber and avoid regularly eating high fat or \"junk\" foods?  Yes    Taking medications regularly:  Yes    Medication side effects:  None    Ability to successfully perform activities of daily living:  No assistance needed    Home Safety:  No safety concerns identified    Hearing Impairment:  Difficulty following a conversation in a noisy restaurant or crowded room    In the past 6 months, have you been bothered by leaking of urine? Yes    In general, how would you rate your overall mental or emotional health?  Good      PHQ-2 Total Score: 0    Additional concerns today:  Yes  Imm/Inj  Pertinent negatives include no abdominal pain, arthralgias, chest pain, chills, congestion, coughing, fever, headaches, joint swelling, myalgias, nausea, rash, sore throat or weakness.     Do you feel safe in your environment? Yes    Have you ever done Advance Care Planning? (For example, a Health Directive, POLST, or a discussion with a medical provider or your loved ones about your wishes): No, advance care planning information given to patient to review.  Advanced care planning was discussed at today's visit.       Fall risk  Fallen 2 or more times in the past year?: Yes  Any fall with injury in the past year?: Yes  Timed Up and Go Test (>13.5 is fall risk; contact physician) : 8    Cognitive Screening   1) Repeat 3 items " (Leader, Season, Table)    2) Clock draw: NORMAL  3) 3 item recall: Recalls 3 objects  Results: 3 items recalled: COGNITIVE IMPAIRMENT LESS LIKELY    Mini-CogTM Copyright LAWRENCE Fitzpatrick. Licensed by the author for use in [facility]; reprinted with permission (jess@[facility]). All rights reserved.      Do you have sleep apnea, excessive snoring or daytime drowsiness?: yes    Reviewed and updated as needed this visit by clinical staff  Tobacco  Allergies  Meds   Med Hx  Surg Hx  Fam Hx  Soc Hx       Reviewed and updated as needed this visit by Provider               Social History     Tobacco Use     Smoking status: Never Smoker     Smokeless tobacco: Never Used   Substance Use Topics     Alcohol use: No     If you drink alcohol do you typically have >3 drinks per day or >7 drinks per week? No    Alcohol Use 1/19/2022   Prescreen: >3 drinks/day or >7 drinks/week? Not Applicable   Prescreen: >3 drinks/day or >7 drinks/week? -   No flowsheet data found.        Hyperlipidemia Follow-Up      Are you regularly taking any medication or supplement to lower your cholesterol?   Yes- simvastatin 40mg    Are you having muscle aches or other side effects that you think could be caused by your cholesterol lowering medication?  No    Hypertension Follow-up      Do you check your blood pressure regularly outside of the clinic? No     Are you following a low salt diet? Yes    Are your blood pressures ever more than 140 on the top number (systolic) OR more   than 90 on the bottom number (diastolic), for example 140/90? No      Current providers sharing in care for this patient include:   Patient Care Team:  Fern Sheppard MD as PCP - General (Family Practice)  Fern Sheppard MD as Assigned PCP    The following health maintenance items are reviewed in Epic and correct as of today:  Health Maintenance Due   Topic Date Due     ANNUAL REVIEW OF  ORDERS  Never done     HEPATITIS C SCREENING  Never done     ZOSTER  "IMMUNIZATION (1 of 2) Never done     ADVANCE CARE PLANNING  04/26/2018     HEMOGLOBIN  10/15/2020     INFLUENZA VACCINE (1) 09/01/2021     BMP  10/27/2021     CMP  10/27/2021     LIPID  10/27/2021     MICROALBUMIN  10/27/2021     FALL RISK ASSESSMENT  10/27/2021              Review of Systems   Constitutional: Negative for chills and fever.   HENT: Negative for congestion, ear pain, hearing loss and sore throat.    Eyes: Negative for pain and visual disturbance.   Respiratory: Negative for cough and shortness of breath.    Cardiovascular: Negative for chest pain, palpitations and peripheral edema.   Gastrointestinal: Negative for abdominal pain, constipation, heartburn, hematochezia and nausea.   Genitourinary: Positive for frequency and urgency. Negative for dysuria, genital sores, hematuria, impotence and penile discharge.   Musculoskeletal: Negative for arthralgias, joint swelling and myalgias.   Skin: Negative for rash.   Neurological: Negative for dizziness, weakness, headaches and paresthesias.   Psychiatric/Behavioral: Negative for mood changes. The patient is not nervous/anxious.           OBJECTIVE:   /66 (BP Location: Right arm, Patient Position: Sitting, Cuff Size: Adult Regular)   Pulse 86   Temp 97.7  F (36.5  C) (Temporal)   Resp 16   Ht 1.854 m (6' 1\")   Wt 65.3 kg (144 lb)   SpO2 99%   BMI 19.00 kg/m   Estimated body mass index is 19 kg/m  as calculated from the following:    Height as of this encounter: 1.854 m (6' 1\").    Weight as of this encounter: 65.3 kg (144 lb).  Physical Exam  GENERAL: healthy, alert and no distress  EYES: Eyes grossly normal to inspection, PERRL and conjunctivae and sclerae normal  HENT: ear canals and TM's normal, nose and mouth without ulcers or lesions  NECK: no adenopathy, no asymmetry, masses, or scars and thyroid normal to palpation  RESP: lungs clear to auscultation - no rales, rhonchi or wheezes  CV: regular rate and rhythm, normal S1 S2, no S3 or S4, " no murmur, click or rub, no peripheral edema and peripheral pulses strong  ABDOMEN: soft, nontender, no hepatosplenomegaly, no masses and bowel sounds normal  MS: no gross musculoskeletal defects noted, no edema  SKIN: several dry appearing red macules on the forehead and top of his head, one scabbing spot more posteriorly on head  NEURO: Normal strength and tone, mentation intact and speech normal  PSYCH: mentation appears normal, affect normal/bright    Diagnostic Test Results:  Labs reviewed in Epic    ASSESSMENT / PLAN:       ICD-10-CM    1. Encounter for Medicare annual wellness exam  Z00.00    2. Essential hypertension with goal blood pressure less than 140/90  I10    3. Hyperlipidemia LDL goal <130  E78.5    4. Stage 3 chronic kidney disease, unspecified whether stage 3a or 3b CKD (H)  N18.30    5. Benign prostatic hyperplasia with nocturia  N40.1     R35.1    6. Need for prophylactic vaccination and inoculation against influenza  Z23       Medicare annual wellness visit, subsequent  He would like to complete stool test instead of colonoscopy. will complete Cologuard this year.   Fasting labs today.  I recommended Shingrix vaccine  I recommended influenza vaccine      Essential hypertension with goal blood pressure less than 140/90  Controlled  CMP today   He was previously on HCTZ, stopped 5/2015. Continues on lisinopril 10 mg.  - lisinopril (PRINIVIL/ZESTRIL) 10 MG tablet; Take 1 tablet (10 mg) by mouth daily  Dispense: 90 tablet; Refill: 3      CKD (chronic kidney disease) stage 3, GFR 30-59 ml/min  Stable.    - Comprehensive metabolic panel  hgb today   - Albumin Random Urine Quantitative         Benign prostatic hyperplasia, presence of lower urinary tract symptoms unspecified  Stable.  He continues to have nocturia once to three times nightly, that has been stable since going off HCTZ in 2015.He is not interested in medication at this time. Discussed lifestyle measures that can help as well.        "Hyperlipidemia LDL goal <130  Stable. Continues simvastatin 40 mg.  - simvastatin (ZOCOR) 40 MG tablet; Take 1 tablet (40 mg) by mouth At Bedtime For cholesterol  Dispense: 90 tablet; Refill: 3  - Lipid panel reflex to direct LDL       Colon cancer screening     Cologuard ordered     Skin lesions  Several dry red patches on his forehead and top of head that look like possible          COUNSELING:  Reviewed preventive health counseling, as reflected in patient instructions    Estimated body mass index is 19 kg/m  as calculated from the following:    Height as of this encounter: 1.854 m (6' 1\").    Weight as of this encounter: 65.3 kg (144 lb).        He reports that he has never smoked. He has never used smokeless tobacco.      Appropriate preventive services were discussed with this patient, including applicable screening as appropriate for cardiovascular disease, diabetes, osteopenia/osteoporosis, and glaucoma.  As appropriate for age/gender, discussed screening for colorectal cancer, prostate cancer, breast cancer, and cervical cancer. Checklist reviewing preventive services available has been given to the patient.    Reviewed patients plan of care and provided an AVS. The Intermediate Care Plan ( asthma action plan, low back pain action plan, and migraine action plan) for Chavez meets the Care Plan requirement. This Care Plan has been established and reviewed with the Patient.    Counseling Resources:  ATP IV Guidelines  Pooled Cohorts Equation Calculator  Breast Cancer Risk Calculator  Breast Cancer: Medication to Reduce Risk  FRAX Risk Assessment  ICSI Preventive Guidelines  Dietary Guidelines for Americans, 2010  [employer] MyPlate  ASA Prophylaxis  Lung CA Screening    Fern Sheppard MD, MD [facility]    Identified Health Risks:  "

## 2022-01-19 NOTE — PATIENT INSTRUCTIONS
Patient Education   Personalized Prevention Plan  You are due for the preventive services outlined below.  Your care team is available to assist you in scheduling these services.  If you have already completed any of these items, please share that information with your care team to update in your medical record.  Health Maintenance Due   Topic Date Due     ANNUAL REVIEW OF HM ORDERS  Never done     Hepatitis C Screening  Never done     Zoster (Shingles) Vaccine (1 of 2) Never done     Discuss Advance Care Planning  04/26/2018     Hemoglobin  10/15/2020     Flu Vaccine (1) 09/01/2021     Annual Wellness Visit  10/27/2021     Basic Metabolic Panel  10/27/2021     Comprehensive Metabolic Panel  10/27/2021     Cholesterol Lab  10/27/2021     Kidney Microalbumin Urine Test  10/27/2021     FALL RISK ASSESSMENT  10/27/2021       Preventing Falls at Home  A person can fall for many reasons. Older adults may fall because reaction time slows down as we age. Your muscles and joints may get stiff, weak, or less flexible because of illness, medicines, or a physical condition.   Other health problems that make falls more likely include:     Arthritis    Dizziness or lightheadedness when you stand up (orthostatic hypotension)    History of a stroke    Dizziness    Anemia    Certain medicines taken for mental illness or to control blood pressure.    Problems with balance or gait    Bladder or urinary problems    History of falling    Changes in vision (vision impairment)    Changes in thinking skills and memory (cognitive impairment)  Injuries from a fall can include serious injuries such as broken bones, dislocated joints, internal bleeding and cuts. Injuries like these can limit your independence.   Prevention tips  To help prevent falls and fall-related injuries, follow the tips below.    Floors  To make floors safer:     Put nonskid pads under area rugs.    Remove small rugs.    Replace worn floor coverings.    Tack carpets  firmly to each step on carpeted stairs. Put nonskid strips on the edges of uncarpeted stairs.    Keep floors and stairs free of clutter and cords.    Arrange furniture so there are clear pathways.    Clean up any spills right away.  Bathrooms    To make bathrooms safer:     Install grab bars in the tub or shower.    Apply nonskid strips or put a nonskid rubber mat in the tub or shower.    Sit on a bath chair to bathe.    Use bathmats with nonskid backing.  Lighting  To improve visibility in your home:      Keep a flashlight in each room. Or put a lamp next to the bed within easy reach.    Put nightlights in the bedrooms, hallways, kitchen, and bathrooms.    Make sure all stairways have good lighting.    Take your time when going up and down stairs.    Put handrails on both sides of stairs and in walkways for more support. To prevent injury to your wrist or arm, don t use handrails to pull yourself up.    Install grab bars to pull yourself up.    Move or rearrange items that you use often. This will make them easier to find or reach.    Look at your home to find any safety hazards. Especially look at doorways, walkways, and the driveway. Remove or repair any safety problems that you find.  Other changes to make    Look around to find any safety hazards. Look closely at doorways, walkways, and the driveway. Remove or repair any safety problems that you find.    Wear shoes that fit well.    Take your time when going up and down stairs.    Put handrails on both sides of stairs and in walkways for more support. To prevent injury to your wrist or arm, don t use handrails to pull yourself up.    Install grab bars wherever needed to pull yourself up.    Arrange items that you use often. This will make them easier to find or reach.    [email or web address] last reviewed this educational content on 3/1/2020    3942-5692 [employer]. All rights reserved. This information is not intended as a substitute for professional  medical care. Always follow your healthcare professional's instructions.          I recommend getting the new shingles vaccine, Shingrix.  You can call your insurance company to find out if this vaccine is covered.  You may also ask our pharmacy to check if your insurance covers Shingrix if given at the pharmacy.

## 2022-01-20 LAB
ALBUMIN SERPL-MCNC: 4.1 G/DL (ref 3.4–5)
ALP SERPL-CCNC: 88 U/L (ref 40–150)
ALT SERPL W P-5'-P-CCNC: 26 U/L (ref 0–70)
ANION GAP SERPL CALCULATED.3IONS-SCNC: 4 MMOL/L (ref 3–14)
AST SERPL W P-5'-P-CCNC: 19 U/L (ref 0–45)
BILIRUB SERPL-MCNC: 0.9 MG/DL (ref 0.2–1.3)
BUN SERPL-MCNC: 28 MG/DL (ref 7–30)
CALCIUM SERPL-MCNC: 9.1 MG/DL (ref 8.5–10.1)
CHLORIDE BLD-SCNC: 106 MMOL/L (ref 94–109)
CHOLEST SERPL-MCNC: 196 MG/DL
CO2 SERPL-SCNC: 29 MMOL/L (ref 20–32)
CREAT SERPL-MCNC: 1.3 MG/DL (ref 0.66–1.25)
CREAT UR-MCNC: 243 MG/DL
FASTING STATUS PATIENT QL REPORTED: NORMAL
GFR SERPL CREATININE-BSD FRML MDRD: 57 ML/MIN/1.73M2
GLUCOSE BLD-MCNC: 94 MG/DL (ref 70–99)
HCV AB SERPL QL IA: NONREACTIVE
HDLC SERPL-MCNC: 84 MG/DL
LDLC SERPL CALC-MCNC: 100 MG/DL
MICROALBUMIN UR-MCNC: 27 MG/L
MICROALBUMIN/CREAT UR: 11.11 MG/G CR (ref 0–17)
NONHDLC SERPL-MCNC: 112 MG/DL
POTASSIUM BLD-SCNC: 4.3 MMOL/L (ref 3.4–5.3)
PROT SERPL-MCNC: 7.2 G/DL (ref 6.8–8.8)
SODIUM SERPL-SCNC: 139 MMOL/L (ref 133–144)
TRIGL SERPL-MCNC: 58 MG/DL

## 2022-02-03 ENCOUNTER — TRANSFERRED RECORDS (OUTPATIENT)
Dept: HEALTH INFORMATION MANAGEMENT | Facility: CLINIC | Age: 77
End: 2022-02-03
Payer: COMMERCIAL

## 2022-02-03 LAB — COLOGUARD-ABSTRACT: NEGATIVE

## 2022-04-27 ENCOUNTER — TELEPHONE (OUTPATIENT)
Dept: FAMILY MEDICINE | Facility: CLINIC | Age: 77
End: 2022-04-27
Payer: COMMERCIAL

## 2022-04-27 NOTE — TELEPHONE ENCOUNTER
Discussed all of the below with patient.  Patient requests that a letter of the results be sent as they were previously, and that MyChart can indeed be deactivated and he will consider reactivating in the future.    Letter placed in outgoing mail.    JADON Carver RN  [facility]

## 2022-04-27 NOTE — TELEPHONE ENCOUNTER
RN-please call Al regarding the letter that he sent to me.  Let him know I am so glad he did send a letter.  I will send his letter through the pod B nurse basket. I do not remember him asking that mychart be deactivated at his visit. I think we discussed him calling for mychart help, so I apologize that he did not see results.     1.  I did see his Cologuard result and sent a result note for his Cologuard test 2 months ago via [employer].    2.  I do have Dr. Lobo's note from dermatology.  There was not any indication of something that I needed to contact Al about in Dr. Lobo's note. Let me know if Al has an additional concerns regarding his skin.  Normally it is the dermatologist that contacts people about biopsy results and if results are benign, nothing further needs to be done other than routine skin screening based on what your dermatologist recommends (yearly, every other year, as needed, for instance).     3. I sent a result notes about his labs via [employer] in February. Everything was stable.     4. Please deactivate mychart, since he has not had success using it.     Thanks!     Fern Sheppard M.D.

## 2022-04-27 NOTE — LETTER
[facility] [address] [place] [address]  [phone]        April 27, 2022    Regarding:  Chavez Perkins  [address]  [facility] [address]              Felicia Byrne:   It was nice to speak with you today.  Below/enclosed are your lab results from 1/29/22:    Hemoglobin 15.4  Hepatitis C antibody: nonreactive  Creatinine Urine: 243  Albumin Urine: 27    I have enclosed Cologuard, dermatology, and metabolic panel results separately.      Sincerely,    Sandie Carver RN, for  Fern Sheppard MD

## 2022-04-27 NOTE — LETTER
[facility] [address] [place] [address]  [phone]        April 27, 2022    Regarding:  Chavez SHEPHERD Gregorio  [address]  [place] [facility] [address]          Felicia Byrne:   It was nice to speak with you today.  I have enclosed your detailed lab results from January, your dermatology biopsy result and your Cologuard result.  All are negative, benign, or stable.  Your hemoglobin was 15.4 which is normal, and your Hepatitis C screening was nonreactive as well.    I have confirmed that your [employer] account is deactivated per our conversation.      Sincerely,    Sandie Carver RN, for  Fern Sheppard MD

## 2022-11-25 DIAGNOSIS — I10 ESSENTIAL HYPERTENSION WITH GOAL BLOOD PRESSURE LESS THAN 140/90: ICD-10-CM

## 2022-11-25 DIAGNOSIS — E78.5 HYPERLIPIDEMIA LDL GOAL <130: ICD-10-CM

## 2022-11-25 RX ORDER — SIMVASTATIN 40 MG
40 TABLET ORAL AT BEDTIME
Qty: 90 TABLET | Refills: 0 | Status: SHIPPED | OUTPATIENT
Start: 2022-11-25 | End: 2023-02-13

## 2022-11-25 NOTE — TELEPHONE ENCOUNTER
Refilled simvastatin per protocol    Lisinopril- creatinine out of range    Creatinine   Date Value Ref Range Status   01/19/2022 1.30 (H) 0.66 - 1.25 mg/dL Final   10/27/2020 1.24 0.66 - 1.25 mg/dL Final     RICA ParkerN RN  [facility]

## 2022-11-25 NOTE — TELEPHONE ENCOUNTER
Patient has appointment schedule for 2/13/2023 with Dr. Sheppard this was first available. Will need enough medication filled to get to this appointment

## 2022-11-28 RX ORDER — LISINOPRIL 10 MG/1
10 TABLET ORAL DAILY
Qty: 90 TABLET | Refills: 0 | Status: SHIPPED | OUTPATIENT
Start: 2022-11-28 | End: 2023-02-13

## 2023-02-13 ENCOUNTER — OFFICE VISIT (OUTPATIENT)
Dept: FAMILY MEDICINE | Facility: CLINIC | Age: 78
End: 2023-02-13
Payer: COMMERCIAL

## 2023-02-13 VITALS
SYSTOLIC BLOOD PRESSURE: 118 MMHG | BODY MASS INDEX: 18.18 KG/M2 | HEART RATE: 71 BPM | RESPIRATION RATE: 14 BRPM | WEIGHT: 137.2 LBS | DIASTOLIC BLOOD PRESSURE: 62 MMHG | OXYGEN SATURATION: 99 % | HEIGHT: 73 IN | TEMPERATURE: 98.5 F

## 2023-02-13 DIAGNOSIS — Z00.00 ENCOUNTER FOR MEDICARE ANNUAL WELLNESS EXAM: Primary | ICD-10-CM

## 2023-02-13 DIAGNOSIS — E78.5 HYPERLIPIDEMIA LDL GOAL <130: ICD-10-CM

## 2023-02-13 DIAGNOSIS — N40.1 BENIGN PROSTATIC HYPERPLASIA WITH NOCTURIA: ICD-10-CM

## 2023-02-13 DIAGNOSIS — I10 ESSENTIAL HYPERTENSION WITH GOAL BLOOD PRESSURE LESS THAN 140/90: ICD-10-CM

## 2023-02-13 DIAGNOSIS — N18.30 STAGE 3 CHRONIC KIDNEY DISEASE, UNSPECIFIED WHETHER STAGE 3A OR 3B CKD (H): ICD-10-CM

## 2023-02-13 DIAGNOSIS — R35.1 BENIGN PROSTATIC HYPERPLASIA WITH NOCTURIA: ICD-10-CM

## 2023-02-13 LAB
ALBUMIN SERPL BCG-MCNC: 4.4 G/DL (ref 3.5–5.2)
ALP SERPL-CCNC: 89 U/L (ref 40–129)
ALT SERPL W P-5'-P-CCNC: 21 U/L (ref 10–50)
ANION GAP SERPL CALCULATED.3IONS-SCNC: 12 MMOL/L (ref 7–15)
AST SERPL W P-5'-P-CCNC: 30 U/L (ref 10–50)
BILIRUB SERPL-MCNC: 0.4 MG/DL
BUN SERPL-MCNC: 23.5 MG/DL (ref 8–23)
CALCIUM SERPL-MCNC: 9.7 MG/DL (ref 8.8–10.2)
CHLORIDE SERPL-SCNC: 105 MMOL/L (ref 98–107)
CHOLEST SERPL-MCNC: 187 MG/DL
CREAT SERPL-MCNC: 1.24 MG/DL (ref 0.67–1.17)
CREAT UR-MCNC: 161 MG/DL
DEPRECATED HCO3 PLAS-SCNC: 25 MMOL/L (ref 22–29)
GFR SERPL CREATININE-BSD FRML MDRD: 60 ML/MIN/1.73M2
GLUCOSE SERPL-MCNC: 109 MG/DL (ref 70–99)
HDLC SERPL-MCNC: 84 MG/DL
HGB BLD-MCNC: 14.8 G/DL (ref 13.3–17.7)
LDLC SERPL CALC-MCNC: 94 MG/DL
MICROALBUMIN UR-MCNC: 21.8 MG/L
MICROALBUMIN/CREAT UR: 13.54 MG/G CR (ref 0–17)
NONHDLC SERPL-MCNC: 103 MG/DL
POTASSIUM SERPL-SCNC: 4.6 MMOL/L (ref 3.4–5.3)
PROT SERPL-MCNC: 6.8 G/DL (ref 6.4–8.3)
SODIUM SERPL-SCNC: 142 MMOL/L (ref 136–145)
TRIGL SERPL-MCNC: 47 MG/DL

## 2023-02-13 PROCEDURE — 80053 COMPREHEN METABOLIC PANEL: CPT | Performed by: FAMILY MEDICINE

## 2023-02-13 PROCEDURE — 99214 OFFICE O/P EST MOD 30 MIN: CPT | Mod: 25 | Performed by: FAMILY MEDICINE

## 2023-02-13 PROCEDURE — 90662 IIV NO PRSV INCREASED AG IM: CPT | Performed by: FAMILY MEDICINE

## 2023-02-13 PROCEDURE — G0439 PPPS, SUBSEQ VISIT: HCPCS | Performed by: FAMILY MEDICINE

## 2023-02-13 PROCEDURE — 85018 HEMOGLOBIN: CPT | Performed by: FAMILY MEDICINE

## 2023-02-13 PROCEDURE — 82570 ASSAY OF URINE CREATININE: CPT | Performed by: FAMILY MEDICINE

## 2023-02-13 PROCEDURE — G0008 ADMIN INFLUENZA VIRUS VAC: HCPCS | Performed by: FAMILY MEDICINE

## 2023-02-13 PROCEDURE — 82043 UR ALBUMIN QUANTITATIVE: CPT | Performed by: FAMILY MEDICINE

## 2023-02-13 PROCEDURE — 36415 COLL VENOUS BLD VENIPUNCTURE: CPT | Performed by: FAMILY MEDICINE

## 2023-02-13 PROCEDURE — 80061 LIPID PANEL: CPT | Performed by: FAMILY MEDICINE

## 2023-02-13 RX ORDER — TAMSULOSIN HYDROCHLORIDE 0.4 MG/1
0.4 CAPSULE ORAL DAILY
Qty: 90 CAPSULE | Refills: 3 | Status: SHIPPED | OUTPATIENT
Start: 2023-02-13 | End: 2023-07-24 | Stop reason: SINTOL

## 2023-02-13 RX ORDER — LISINOPRIL 10 MG/1
10 TABLET ORAL DAILY
Qty: 90 TABLET | Refills: 3 | Status: SHIPPED | OUTPATIENT
Start: 2023-02-13 | End: 2024-04-08

## 2023-02-13 RX ORDER — SIMVASTATIN 40 MG
40 TABLET ORAL AT BEDTIME
Qty: 90 TABLET | Refills: 3 | Status: SHIPPED | OUTPATIENT
Start: 2023-02-13 | End: 2024-05-01

## 2023-02-13 ASSESSMENT — ENCOUNTER SYMPTOMS
ARTHRALGIAS: 0
HEMATURIA: 0
HEARTBURN: 0
NAUSEA: 0
JOINT SWELLING: 0
HEADACHES: 0
WEAKNESS: 0
DYSURIA: 0
CHILLS: 0
PARESTHESIAS: 0
COUGH: 0
NERVOUS/ANXIOUS: 0
DIZZINESS: 0
SORE THROAT: 0
FEVER: 0
ABDOMINAL PAIN: 0
EYE PAIN: 0
FREQUENCY: 1
HEMATOCHEZIA: 0
PALPITATIONS: 0
DIARRHEA: 0
CONSTIPATION: 0
MYALGIAS: 0

## 2023-02-13 ASSESSMENT — ACTIVITIES OF DAILY LIVING (ADL): CURRENT_FUNCTION: NO ASSISTANCE NEEDED

## 2023-02-13 ASSESSMENT — PAIN SCALES - GENERAL: PAINLEVEL: NO PAIN (0)

## 2023-02-13 NOTE — PATIENT INSTRUCTIONS
Schedule a follow up appointment with dermatology Dr. Lobo.   2. Start tamsulosin 0.4mg once daily to help with urination at night.   3. I recommend getting the shingles vaccine, Shingrix.  You can call your insurance company to find out if this vaccine is covered.  You may also ask our pharmacy to check if your insurance covers Shingrix if given at the pharmacy.  4. Remember to get your bivalent COVID booster (the new booster) soon.        Preventive Health Recommendations  See your health care provider every year to  Review health changes.   Discuss preventive care.    Review your medicines if your doctor has prescribed any.  Talk with your health care provider about whether you should have a test to screen for prostate cancer (PSA).  Every 3 years, have a diabetes test (fasting glucose). If you are at risk for diabetes, you should have this test more often.  Every 5 years, have a cholesterol test. Have this test more often if you are at risk for high cholesterol or heart disease.   Every 10 years, have a colonoscopy. Or, have a yearly FIT test (stool test). These exams will check for colon cancer.  Talk to with your health care provider about screening for Abdominal Aortic Aneurysm if you have a family history of AAA or have a history of smoking.    Shots:   Get a flu shot each year.   Get a tetanus shot every 10 years.   Talk to your doctor about your pneumonia vaccines. There are now two you should receive - Pneumovax (PPSV 23) and Prevnar (PCV 13).  Talk to your pharmacist about a shingles vaccine.   Talk to your doctor about the hepatitis B vaccine.    Nutrition:   Eat at least 5 servings of fruits and vegetables each day.   Eat whole-grain bread, whole-wheat pasta and brown rice instead of white grains and rice.   Get adequate Calcium and Vitamin D.     Lifestyle  Exercise for at least 150 minutes a week (30 minutes a day, 5 days a week). This will help you control your weight and prevent disease.   Limit  alcohol to one drink per day.   No smoking.   Wear sunscreen to prevent skin cancer.   See your dentist every six months for an exam and cleaning.   See your eye doctor every 1 to 2 years to screen for conditions such as glaucoma, macular degeneration and cataracts.    Personalized Prevention Plan  You are due for the preventive services outlined below.  Your care team is available to assist you in scheduling these services.  If you have already completed any of these items, please share that information with your care team to update in your medical record.  Health Maintenance   Topic Date Due    ZOSTER IMMUNIZATION (1 of 2) Never done    COVID-19 Vaccine (4 - Booster for Pfizer series) 02/08/2022    INFLUENZA VACCINE (1) 09/01/2022    BMP  01/19/2023    CMP  01/19/2023    LIPID  01/19/2023    MICROALBUMIN  01/19/2023    ANNUAL REVIEW OF HM ORDERS  01/19/2023    HEMOGLOBIN  01/19/2023    MEDICARE ANNUAL WELLNESS VISIT  02/13/2024    FALL RISK ASSESSMENT  02/13/2024    ADVANCE CARE PLANNING  02/13/2028    DTAP/TDAP/TD IMMUNIZATION (4 - Td or Tdap) 09/11/2028    HEPATITIS C SCREENING  Completed    PHQ-2 (once per calendar year)  Completed    Pneumococcal Vaccine: 65+ Years  Completed    URINALYSIS  Completed    IPV IMMUNIZATION  Aged Out    MENINGITIS IMMUNIZATION  Aged Out    COLORECTAL CANCER SCREENING  Discontinued      Patient Education   Personalized Prevention Plan  You are due for the preventive services outlined below.  Your care team is available to assist you in scheduling these services.  If you have already completed any of these items, please share that information with your care team to update in your medical record.  Health Maintenance Due   Topic Date Due    Zoster (Shingles) Vaccine (1 of 2) Never done    COVID-19 Vaccine (4 - Booster for Pfizer series) 02/08/2022    Flu Vaccine (1) 09/01/2022    Basic Metabolic Panel  01/19/2023    Comprehensive Metabolic Panel  01/19/2023    Cholesterol Lab   01/19/2023    Kidney Microalbumin Urine Test  01/19/2023    ANNUAL REVIEW OF HM ORDERS  01/19/2023    Annual Wellness Visit  01/19/2023    Hemoglobin  01/19/2023

## 2023-02-13 NOTE — PROGRESS NOTES
"SUBJECTIVE:   Al is a 77 year old who presents for Preventive Visit.  Patient has been advised of split billing requirements and indicates understanding: Yes  Are you in the first 12 months of your Medicare coverage?  No    Healthy Habits:     In general, how would you rate your overall health?  Good    Frequency of exercise:  1 day/week    Duration of exercise:  N/A    Do you usually eat at least 4 servings of fruit and vegetables a day, include whole grains    & fiber and avoid regularly eating high fat or \"junk\" foods?  Yes    Taking medications regularly:  Yes    Medication side effects:  Not applicable    Ability to successfully perform activities of daily living:  No assistance needed    Home Safety:  No safety concerns identified    Hearing Impairment:  Difficulty following a conversation in a noisy restaurant or crowded room, feel that people are mumbling or not speaking clearly and need to ask people to speak up or repeat themselves    In the past 6 months, have you been bothered by leaking of urine? Yes    In general, how would you rate your overall mental or emotional health?  Good      PHQ-2 Total Score: 0    Additional concerns today:  No    He would like to start medication for his nocturia. It is about 2-3 times a night that he gets up, so about the same as recent years. He had previously declined medication. Saw urology in 2014. But he is reconsidering.     Walks regularly for exercise.     Continues to work as a cartoonist.     Have you ever done Advance Care Planning? (For example, a Health Directive, POLST, or a discussion with a medical provider or your loved ones about your wishes): Yes, advance care planning is on file.       Fall risk  Fallen 2 or more times in the past year?: No  Any fall with injury in the past year?: No    Cognitive Screening   1) Repeat 3 items (Leader, Season, Table)    2) Clock draw: NORMAL  3) 3 item recall:}Recalls 2 objects   Results: NORMAL clock, 1-2 items recalled: " COGNITIVE IMPAIRMENT LESS LIKELY    Mini-CogTM Copyright LAWRENCE Fitzpatrick. Licensed by the author for use in [facility]; reprinted with permission (jess@.[facility]). All rights reserved.      Do you have sleep apnea, excessive snoring or daytime drowsiness?: no    Reviewed and updated as needed this visit by clinical staff   Tobacco  Allergies               Reviewed and updated as needed this visit by Provider                 Social History     Tobacco Use     Smoking status: Never     Smokeless tobacco: Never   Substance Use Topics     Alcohol use: No     If you drink alcohol do you typically have >3 drinks per day or >7 drinks per week? No    Alcohol Use 2/13/2023   Prescreen: >3 drinks/day or >7 drinks/week? Not Applicable   Prescreen: >3 drinks/day or >7 drinks/week? -   No flowsheet data found.     Current providers sharing in care for this patient include:   Patient Care Team:  Fern Sheppard MD as PCP - General (Family Practice)  Fern Sheppard MD as Assigned PCP    The following health maintenance items are reviewed in Epic and correct as of today:  Health Maintenance   Topic Date Due     ZOSTER IMMUNIZATION (1 of 2) Never done     COVID-19 Vaccine (4 - Booster for Pfizer series) 02/08/2022     INFLUENZA VACCINE (1) 09/01/2022     BMP  01/19/2023     CMP  01/19/2023     LIPID  01/19/2023     MICROALBUMIN  01/19/2023     ANNUAL REVIEW OF HM ORDERS  01/19/2023     HEMOGLOBIN  01/19/2023     MEDICARE ANNUAL WELLNESS VISIT  02/13/2024     FALL RISK ASSESSMENT  02/13/2024     ADVANCE CARE PLANNING  02/13/2028     DTAP/TDAP/TD IMMUNIZATION (4 - Td or Tdap) 09/11/2028     HEPATITIS C SCREENING  Completed     PHQ-2 (once per calendar year)  Completed     Pneumococcal Vaccine: 65+ Years  Completed     URINALYSIS  Completed     IPV IMMUNIZATION  Aged Out     MENINGITIS IMMUNIZATION  Aged Out     COLORECTAL CANCER SCREENING  Discontinued                Review of Systems   Constitutional: Negative for chills and  "fever.   HENT: Negative for ear pain, hearing loss and sore throat.    Eyes: Negative for pain and visual disturbance.   Respiratory: Negative for cough.    Cardiovascular: Negative for chest pain, palpitations and peripheral edema.   Gastrointestinal: Negative for abdominal pain, constipation, diarrhea, heartburn, hematochezia and nausea.   Genitourinary: Positive for frequency and urgency. Negative for dysuria, genital sores, hematuria, impotence and penile discharge.   Musculoskeletal: Negative for arthralgias, joint swelling and myalgias.   Skin: Negative for rash.   Neurological: Negative for dizziness, weakness, headaches and paresthesias.   Psychiatric/Behavioral: Negative for mood changes. The patient is not nervous/anxious.           OBJECTIVE:   /62 (BP Location: Right arm, Patient Position: Sitting, Cuff Size: Adult Regular)   Pulse 71   Temp 98.5  F (36.9  C) (Temporal)   Resp 14   Ht 1.854 m (6' 1\")   Wt 62.2 kg (137 lb 3.2 oz)   SpO2 99%   BMI 18.10 kg/m   Estimated body mass index is 18.1 kg/m  as calculated from the following:    Height as of this encounter: 1.854 m (6' 1\").    Weight as of this encounter: 62.2 kg (137 lb 3.2 oz).  Physical Exam  GENERAL: healthy, alert and no distress  EYES: Eyes grossly normal to inspection, PERRL and conjunctivae and sclerae normal  HENT: ear canals and TM's normal, nose and mouth without ulcers or lesions  NECK: no adenopathy, no asymmetry, masses, or scars and thyroid normal to palpation  RESP: lungs clear to auscultation - no rales, rhonchi or wheezes  CV: regular rate and rhythm, normal S1 S2, no S3 or S4, no murmur, click or rub, no peripheral edema and peripheral pulses strong  ABDOMEN: soft, nontender, no hepatosplenomegaly, no masses and bowel sounds normal  MS: no gross musculoskeletal defects noted, no edema  SKIN: no suspicious lesions or rashes  NEURO: Normal strength and tone, mentation intact and speech normal  PSYCH: mentation appears " normal, affect normal/bright    Diagnostic Test Results:  Labs reviewed in Epic    ASSESSMENT / PLAN:       ICD-10-CM    1. Encounter for Medicare annual wellness exam  Z00.00       2. Essential hypertension with goal blood pressure less than 140/90  I10 lisinopril (ZESTRIL) 10 MG tablet     Comprehensive metabolic panel (BMP + Alb, Alk Phos, ALT, AST, Total. Bili, TP)     Albumin Random Urine Quantitative with Creat Ratio      3. Hyperlipidemia LDL goal <130  E78.5 simvastatin (ZOCOR) 40 MG tablet     Lipid panel reflex to direct LDL Fasting      4. Stage 3 chronic kidney disease, unspecified whether stage 3a or 3b CKD (H)  N18.30 Comprehensive metabolic panel (BMP + Alb, Alk Phos, ALT, AST, Total. Bili, TP)     Albumin Random Urine Quantitative with Creat Ratio     Hemoglobin      Medicare annual wellness visit, subsequent  Healthy  He had a negative Cologuard test last year  Fasting labs today.  I recommended Shingrix vaccine    influenza vaccine recommended  Bivalent COVID vaccine recommended - he declined today       Essential hypertension with goal blood pressure less than 140/90  Controlled  CMP today and urine albumin  He was previously on HCTZ, stopped 5/2015. Continues on lisinopril 10 mg.  - lisinopril (PRINIVIL/ZESTRIL) 10 MG tablet; Take 1 tablet (10 mg) by mouth daily  Dispense: 90 tablet; Refill: 3      CKD (chronic kidney disease) stage 3, GFR 30-59 ml/min  Stable.    - Comprehensive metabolic panel  hgb today   - Albumin Random Urine Quantitative         Benign prostatic hyperplasia, presence of lower urinary tract symptoms unspecified   About the same.  He continues to have nocturia once to three times nightly, that has been stable since going off HCTZ in 2015. He would like to start medicagtion. Will start tamsulosin 0.4mg daily. reviewed risks/side effects/benefits. If not improving will follow up with urology         Hyperlipidemia LDL goal <130  Stable. Continues simvastatin 40 mg.  -  simvastatin (ZOCOR) 40 MG tablet; Take 1 tablet (40 mg) by mouth At Bedtime For cholesterol  Dispense: 90 tablet; Refill: 3  - Lipid panel reflex to direct LD      Actinic keratoses  Several dry red patches on his forehead have returned. They were treated by derm last year. He will schedule follow up.                COUNSELING:  Reviewed preventive health counseling, as reflected in patient instructions        He reports that he has never smoked. He has never used smokeless tobacco.      Appropriate preventive services were discussed with this patient, including applicable screening as appropriate for cardiovascular disease, diabetes, osteopenia/osteoporosis, and glaucoma.  As appropriate for age/gender, discussed screening for colorectal cancer, prostate cancer, breast cancer, and cervical cancer. Checklist reviewing preventive services available has been given to the patient.    Reviewed patients plan of care and provided an AVS. The Intermediate Care Plan ( asthma action plan, low back pain action plan, and migraine action plan) for Chavez meets the Care Plan requirement. This Care Plan has been established and reviewed with the Patient.       Fenr Sheppard MD   [facility]    Identified Health Risks:

## 2023-05-17 ENCOUNTER — TRANSFERRED RECORDS (OUTPATIENT)
Dept: HEALTH INFORMATION MANAGEMENT | Facility: CLINIC | Age: 78
End: 2023-05-17
Payer: COMMERCIAL

## 2023-05-23 ENCOUNTER — TELEPHONE (OUTPATIENT)
Dept: FAMILY MEDICINE | Facility: CLINIC | Age: 78
End: 2023-05-23
Payer: COMMERCIAL

## 2023-05-23 DIAGNOSIS — N40.1 BENIGN PROSTATIC HYPERPLASIA WITH NOCTURIA: Primary | ICD-10-CM

## 2023-05-23 DIAGNOSIS — R35.1 BENIGN PROSTATIC HYPERPLASIA WITH NOCTURIA: Primary | ICD-10-CM

## 2023-05-23 NOTE — LETTER
June 14, 2023      Chavez Perkins  [address] [place] [address]        Dear Chavez,     We received your recent letter and have been unable to contact you via phone call.    I recommend stopping the tamsulosin due to your dizziness. A referral to urology has been placed to explore alternate treatment options. If urology has not yet contacted you, please call [phone] to schedule an appointment with them.    Additionally, your lab results from 2/13/23 were stable.    Please contact us at the number listed above to update your preferred phone number(s) for future communications from us.      Sincerely,        Fern Sheppard MD, MD

## 2023-05-23 NOTE — TELEPHONE ENCOUNTER
Dr. Sheppard-Please review and advise.  May patient stop Tamsulosin?  If so, do you recommend Urology consult as next step?    Call received from patient:  1. Tamsulosin is not helping-should he continue to take it?   2. This medication also makes him feel dizzy  3. Started taking Tamsulosin at the end of March 2023    Informed patient message will be sent to Dr. Sheppard.    Patient verbalized understanding and in agreement with plan.    Thank you!  RICA KnappN, RN-[facility]

## 2023-05-24 NOTE — TELEPHONE ENCOUNTER
Home number is not in service.  Mobile voice mail is not set up.  Recall at later time.  ---  Urology:  [facility] will call you to coordinate care as prescribed your provider. If you don t hear from a representative within 2 business days, please call [phone].  ---  Sandie RUBIO RN  [facility]

## 2023-05-24 NOTE — TELEPHONE ENCOUNTER
I recommend stopping the tamsulosin and scheduling with urology. Thanks for pending the referral!     -Fern Sheppard M.D.

## 2023-05-25 NOTE — TELEPHONE ENCOUNTER
Attempted both emergency numbers listed with Daniel and Franc. Both numbers also stated not in service.     Zoila Hanks, BSN RN  [facility]

## 2023-06-03 ENCOUNTER — MEDICAL CORRESPONDENCE (OUTPATIENT)
Dept: HEALTH INFORMATION MANAGEMENT | Facility: CLINIC | Age: 78
End: 2023-06-03
Payer: COMMERCIAL

## 2023-06-14 NOTE — TELEPHONE ENCOUNTER
Attempted to contact patient at numbers provided via letter/message from Dr. Sheppard.     -- [phone] goes straight to voicemail that is not set up.     -- [phone] number connected to busy signal on multiple attempts.     Sending letter to address on file with provider recommendations and request for patient to contact us to update his preferred contact numbers.

## 2023-06-14 NOTE — TELEPHONE ENCOUNTER
I received a letter from Al asking about what to do with his tamsulosin (has continued it and is feeling lightheaded taking it) and his results.     His letter indicates his cell phone number is [phone] and his alternate phone number is [phone]. Please try calling these numbers to let him know he should stop the tamsulosin and correct the numbers in his chart. Let him know his labs are stable and please deactivate mychart so he receives results via the mail.     Thanks!   Fern Sheppard M.D.

## 2023-06-28 NOTE — TELEPHONE ENCOUNTER
MEDICAL RECORDS REQUEST   [facility] Clinic  [address] [place] [id number]  PHONE: [phone]  Fax: [phone]        FUTURE VISIT INFORMATION                                                       APPOINTMENT INFORMATION:    Date: 07/24/2023     Provider:  Tavo Taveras PA-C    Reason for Visit/Diagnosis: Benign prostatic hyperplasia with nocturia    REFERRAL INFORMATION:    Referring provider:  Fern Sheppard MD in [facility]    RECORDS REQUESTED FOR VISIT                                                     NOTES  STATUS/DETAILS   OFFICE NOTE from referring provider  yes, 02/13/2023 -- Fern Sheppard MD in [facility]   OFFICE NOTE from other specialist  yes   MEDICATION LIST  yes   LABS     URINALYSIS (UA)  no     PRE-VISIT CHECKLIST      Joint diagnostic appointment coordinated correctly          (ensure right order & amount of time) Yes   RECORD COLLECTION COMPLETE Yes

## 2023-07-21 NOTE — PROGRESS NOTES
Subjective     REQUESTING PROVIDER  Dr. Fern Sheppard MD    REASON FOR VISIT  Nocturia    HISTORY OF PRESENT ILLNESS  Mr. Perkins is a pleasant 77 year old male with a past medical history significant for hyperlipidemia, CKD stage III, and nocturia who presents today for further discussion of his nocturia. I personally reviewed the primary care note from 2/13/23 in preparation for this visit.     According to that note, Al has been struggling with nocturia x1-3 for many years and has previously been seen in urology in 2014, but at that time refused medications or intervention. Flomax was started at that visit in February but is unclear whether it helped or not.    Today:    Flomax didn't work much, got dizzy     Endorses close to 3 times per night on average     10 years, worsening    Baseline urgency    3 hours between bathroom trips    Fluids  o Milk: 12-30 oz  o Pop: 16 - 20 oz  o Water: 10 oz  o One 6-8 oz glass of pop after supper time     Goes to bed between 12am - 1am, up around 6-8am     No history of gross hematuria     No history of kidney stones     No history of retention     No history of retention or UTIs     SOCIAL HISTORY   Denies any history of or current smoking     FAMILY HISTORY   Denies any known family history of urologic malignancy     Objective     PHYSICAL EXAM  Physical Exam  Constitutional:       Appearance: Normal appearance.   HENT:      Head: Normocephalic and atraumatic.      Right Ear: External ear normal.      Left Ear: External ear normal.      Nose: Nose normal.   Eyes:      General:         Right eye: No discharge.         Left eye: No discharge.   Pulmonary:      Effort: Pulmonary effort is normal. No respiratory distress.   Abdominal:      General: There is no distension.   Musculoskeletal:         General: No deformity.   Neurological:      Mental Status: He is alert and oriented to person, place, and time.   Psychiatric:         Mood and Affect: Mood normal.        LABORATORY  No recent urine testing    TESTING  PVR: 34 mL    Assessment & Plan   1. Nocturia    It was my pleasure to meet with Mr. Perkins in the office today in regards to his bothersome nocturia.  After reviewing his clinical history, I was first happy to let him know that I see no evidence of anything dangerous going on.  I can see this that first due to the fact that he is only retaining approximately 1 ounce of urine after he is finished going to the bathroom, and he has not seen any evidence of gross hematuria.  With this in mind however, I would recommend that we get a urinalysis at his leisure to help rule out the possibility of microscopic hematuria which would prompt a more in-depth work-up.    Assuming he does not have any microscopic blood in his urine, we discussed that nocturia is one of the most frustrating diagnoses in urology for both the patient and the provider.  This is due to the fact that it is difficult to nail down in etiology for bothersome nocturia and the goal of care is to reduce the number of nocturia episodes as well as establish expectations.  In his case, I believe there are a couple of lifestyle changes that could be made, namely cutting back on the carbonated caffeinated beverage he is drinking in the evening as well as working with his primary care team or sleep medicine team regarding the fact that he snores quite a bit and is waking up consistently with a dry mouth.  While he does not meet the usual criteria for someone we would expect to have sleep apnea, this is not out of the question which I believe should prompt a surface level evaluation for sleep apnea.    We also discussed different medical options, first including bladder relaxant medications, as well as non habit-forming sleep aids such as Unisom, Benadryl, and ZzzQuil.  These medications can help him fall asleep and stay asleep, looking to reach deeper levels of sleep that allows his body to ignore low level  signals of light, noise, pain, and possible urges to go to the bathroom.    After discussing these options, Al did express a desire to avoid medications if possible and is interested in getting evaluated for his snoring.  We then discussed different follow-up options including following up as needed versus having a dedicated follow-up visit to touch base and decide whether or not moving forward with a prescription medication like an anticholinergic or beta 5 agonist would be reasonable.  At this time, he would prefer to follow-up as needed, so a business card was provided as a point of contact.  L expressed understanding and agreement with the above discussion and plan.    PLAN    Consideration of snoring work-up with primary care team    Consideration of over-the-counter nonhabit-forming sleep aids, descriptions and names provided and after visit summary    Follow-up with urology as needed using business card as a point of contact    Signed by:    Tavo Taveras PA-C

## 2023-07-24 ENCOUNTER — PRE VISIT (OUTPATIENT)
Dept: UROLOGY | Facility: CLINIC | Age: 78
End: 2023-07-24

## 2023-07-24 ENCOUNTER — OFFICE VISIT (OUTPATIENT)
Dept: UROLOGY | Facility: CLINIC | Age: 78
End: 2023-07-24
Attending: FAMILY MEDICINE
Payer: COMMERCIAL

## 2023-07-24 VITALS
WEIGHT: 140 LBS | DIASTOLIC BLOOD PRESSURE: 88 MMHG | SYSTOLIC BLOOD PRESSURE: 148 MMHG | HEIGHT: 73 IN | BODY MASS INDEX: 18.55 KG/M2 | HEART RATE: 71 BPM

## 2023-07-24 DIAGNOSIS — R35.1 BENIGN PROSTATIC HYPERPLASIA WITH NOCTURIA: Primary | ICD-10-CM

## 2023-07-24 DIAGNOSIS — N40.1 BENIGN PROSTATIC HYPERPLASIA WITH NOCTURIA: Primary | ICD-10-CM

## 2023-07-24 PROCEDURE — 99204 OFFICE O/P NEW MOD 45 MIN: CPT | Performed by: STUDENT IN AN ORGANIZED HEALTH CARE EDUCATION/TRAINING PROGRAM

## 2023-07-24 ASSESSMENT — PAIN SCALES - GENERAL: PAINLEVEL: NO PAIN (0)

## 2023-07-24 NOTE — LETTER
7/24/2023       RE: Chavez Perkins  [address]  [place] [address]     Dear Colleague,    Thank you for referring your patient, Chavez Perkins, to the [facility] [place] at [facility]. Please see a copy of my visit note below.    Subjective     REQUESTING PROVIDER  Dr. Fern Sheppard MD    REASON FOR VISIT  Nocturia    HISTORY OF PRESENT ILLNESS  Mr. Perkins is a pleasant 77 year old male with a past medical history significant for hyperlipidemia, CKD stage III, and nocturia who presents today for further discussion of his nocturia. I personally reviewed the primary care note from 2/13/23 in preparation for this visit.     According to that note, Al has been struggling with nocturia x1-3 for many years and has previously been seen in urology in 2014, but at that time refused medications or intervention. Flomax was started at that visit in February but is unclear whether it helped or not.    Today:  Flomax didn't work much, got dizzy   Endorses close to 3 times per night on average   10 years, worsening  Baseline urgency  3 hours between bathroom trips  Fluids  Milk: 12-30 oz  Pop: 16 - 20 oz  Water: 10 oz  One 6-8 oz glass of pop after supper time   Goes to bed between 12am - 1am, up around 6-8am   No history of gross hematuria   No history of kidney stones   No history of retention   No history of retention or UTIs     SOCIAL HISTORY   Denies any history of or current smoking     FAMILY HISTORY   Denies any known family history of urologic malignancy     Objective     PHYSICAL EXAM  Physical Exam  Constitutional:       Appearance: Normal appearance.   HENT:      Head: Normocephalic and atraumatic.      Right Ear: External ear normal.      Left Ear: External ear normal.      Nose: Nose normal.   Eyes:      General:         Right eye: No discharge.         Left eye: No discharge.   Pulmonary:      Effort: Pulmonary effort is normal. No  respiratory distress.   Abdominal:      General: There is no distension.   Musculoskeletal:         General: No deformity.   Neurological:      Mental Status: He is alert and oriented to person, place, and time.   Psychiatric:         Mood and Affect: Mood normal.       LABORATORY  No recent urine testing    TESTING  PVR: 34 mL    Assessment & Plan   Nocturia    It was my pleasure to meet with Mr. Perkins in the office today in regards to his bothersome nocturia.  After reviewing his clinical history, I was first happy to let him know that I see no evidence of anything dangerous going on.  I can see this that first due to the fact that he is only retaining approximately 1 ounce of urine after he is finished going to the bathroom, and he has not seen any evidence of gross hematuria.  With this in mind however, I would recommend that we get a urinalysis at his leisure to help rule out the possibility of microscopic hematuria which would prompt a more in-depth work-up.    Assuming he does not have any microscopic blood in his urine, we discussed that nocturia is one of the most frustrating diagnoses in urology for both the patient and the provider.  This is due to the fact that it is difficult to nail down in etiology for bothersome nocturia and the goal of care is to reduce the number of nocturia episodes as well as establish expectations.  In his case, I believe there are a couple of lifestyle changes that could be made, namely cutting back on the carbonated caffeinated beverage he is drinking in the evening as well as working with his primary care team or sleep medicine team regarding the fact that he snores quite a bit and is waking up consistently with a dry mouth.  While he does not meet the usual criteria for someone we would expect to have sleep apnea, this is not out of the question which I believe should prompt a surface level evaluation for sleep apnea.    We also discussed different medical options, first  including bladder relaxant medications, as well as non habit-forming sleep aids such as Unisom, Benadryl, and ZzzQuil.  These medications can help him fall asleep and stay asleep, looking to reach deeper levels of sleep that allows his body to ignore low level signals of light, noise, pain, and possible urges to go to the bathroom.    After discussing these options, Al did express a desire to avoid medications if possible and is interested in getting evaluated for his snoring.  We then discussed different follow-up options including following up as needed versus having a dedicated follow-up visit to touch base and decide whether or not moving forward with a prescription medication like an anticholinergic or beta 5 agonist would be reasonable.  At this time, he would prefer to follow-up as needed, so a business card was provided as a point of contact.  L expressed understanding and agreement with the above discussion and plan.    PLAN  Consideration of snoring work-up with primary care team  Consideration of over-the-counter nonhabit-forming sleep aids, descriptions and names provided and after visit summary  Follow-up with urology as needed using business card as a point of contact    Signed by:    Tavo Taveras PA-C

## 2023-07-24 NOTE — NURSING NOTE
"Chief Complaint   Patient presents with    Consult For     Pt stated \"he is leaking as well as not feeling the urge to urinate\"        Blood pressure (!) 148/88, pulse 71, height 1.854 m (6' 1\"), weight 63.5 kg (140 lb). Body mass index is 18.47 kg/m .    Patient Active Problem List   Diagnosis    HYPERLIPIDEMIA LDL GOAL <130    Advanced directives, counseling/discussion    Closed kidney laceration    Benign prostatic hyperplasia with nocturia    CKD (chronic kidney disease) stage 3, GFR 30-59 ml/min (H)    Benign hypertension with CKD (chronic kidney disease) stage III (H)       Allergies   Allergen Reactions    No Known Allergies        Current Outpatient Medications   Medication Sig Dispense Refill    lisinopril (ZESTRIL) 10 MG tablet Take 1 tablet (10 mg) by mouth daily 90 tablet 3    simvastatin (ZOCOR) 40 MG tablet Take 1 tablet (40 mg) by mouth At Bedtime For cholesterol 90 tablet 3    tamsulosin (FLOMAX) 0.4 MG capsule Take 1 capsule (0.4 mg) by mouth daily (Patient not taking: Reported on 7/24/2023) 90 capsule 3       Social History     Tobacco Use    Smoking status: Never    Smokeless tobacco: Never   Vaping Use    Vaping Use: Never used   Substance Use Topics    Alcohol use: No    Drug use: No       Carey Jackson  7/24/2023  7:59 AM     "

## 2023-07-24 NOTE — PATIENT INSTRUCTIONS
Unisom (Doxyalimine succinate), Benadryl, and ZzzQuil as non-habit forming sleep aids.     Reach out to primary care team to discuss possible referral to sleep medicine.

## 2023-07-25 ENCOUNTER — LAB (OUTPATIENT)
Dept: LAB | Facility: CLINIC | Age: 78
End: 2023-07-25
Payer: COMMERCIAL

## 2023-07-25 DIAGNOSIS — N40.1 BENIGN PROSTATIC HYPERPLASIA WITH NOCTURIA: ICD-10-CM

## 2023-07-25 DIAGNOSIS — R35.1 BENIGN PROSTATIC HYPERPLASIA WITH NOCTURIA: ICD-10-CM

## 2023-07-25 LAB
ALBUMIN UR-MCNC: NEGATIVE MG/DL
APPEARANCE UR: CLEAR
BACTERIA #/AREA URNS HPF: ABNORMAL /HPF
BILIRUB UR QL STRIP: NEGATIVE
COLOR UR AUTO: YELLOW
GLUCOSE UR STRIP-MCNC: NEGATIVE MG/DL
HGB UR QL STRIP: NEGATIVE
KETONES UR STRIP-MCNC: NEGATIVE MG/DL
LEUKOCYTE ESTERASE UR QL STRIP: NEGATIVE
NITRATE UR QL: NEGATIVE
PH UR STRIP: 5.5 [PH] (ref 5–7)
RBC #/AREA URNS AUTO: ABNORMAL /HPF
SP GR UR STRIP: 1.02 (ref 1–1.03)
UROBILINOGEN UR STRIP-ACNC: 1 E.U./DL
WBC #/AREA URNS AUTO: ABNORMAL /HPF

## 2023-07-25 PROCEDURE — 81001 URINALYSIS AUTO W/SCOPE: CPT

## 2024-01-15 ENCOUNTER — PATIENT OUTREACH (OUTPATIENT)
Dept: CARE COORDINATION | Facility: CLINIC | Age: 79
End: 2024-01-15
Payer: COMMERCIAL

## 2024-01-29 ENCOUNTER — PATIENT OUTREACH (OUTPATIENT)
Dept: CARE COORDINATION | Facility: CLINIC | Age: 79
End: 2024-01-29
Payer: COMMERCIAL

## 2024-03-07 NOTE — TELEPHONE ENCOUNTER
Lisinopril 10mg      Last Written Prescription Date: 7/25/16  Last Fill Quantity: 90, # refills: 1  Last Office Visit with G, P or [facility] prescribing provider: 7/25/16       POTASSIUM   Date Value Ref Range Status   08/04/2016 4.4 3.4 - 5.3 mmol/L Final     CREATININE   Date Value Ref Range Status   08/04/2016 1.30* 0.66 - 1.25 mg/dL Final     BP Readings from Last 3 Encounters:   07/25/16 108/62   06/09/15 112/70   05/19/15 110/60       Thank you,  Mily Islas CPhT  On behalf of [facility]     sinus pause, cardiac telemonitoring, labs reviewed, team alberto EP

## 2024-04-08 DIAGNOSIS — I10 ESSENTIAL HYPERTENSION WITH GOAL BLOOD PRESSURE LESS THAN 140/90: ICD-10-CM

## 2024-04-08 RX ORDER — LISINOPRIL 10 MG/1
10 TABLET ORAL DAILY
Qty: 90 TABLET | Refills: 0 | Status: SHIPPED | OUTPATIENT
Start: 2024-04-08 | End: 2024-05-01

## 2024-04-08 NOTE — TELEPHONE ENCOUNTER
Medication Question or Refill    Contacts         Type Contact Phone/Fax    04/08/2024 01:16 PM CDT Phone (Incoming) Diego Perkins (Self) [phone] (H)            What medication are you calling about (include dose and sig)?: lisinopril     Preferred Pharmacy:       [facility] - [place], [place] - [address] [place] [address]  Phone: [phone] Fax: [phone]      Controlled Substance Agreement on file:   CSA -- Patient Level:    CSA: None found at the patient level.       Who prescribed the medication?: pcp    Do you need a refill? Yes    When did you use the medication last? Pt will run out 3 days before appt on 5/1    Patient offered an appointment? No    Do you have any questions or concerns?  No      Okay to leave a detailed message?: No at Cell number on file:    Telephone Information:   Mobile [phone]

## 2024-05-01 ENCOUNTER — OFFICE VISIT (OUTPATIENT)
Dept: FAMILY MEDICINE | Facility: CLINIC | Age: 79
End: 2024-05-01
Payer: COMMERCIAL

## 2024-05-01 VITALS
OXYGEN SATURATION: 99 % | HEART RATE: 66 BPM | TEMPERATURE: 97 F | WEIGHT: 136 LBS | BODY MASS INDEX: 18.42 KG/M2 | SYSTOLIC BLOOD PRESSURE: 133 MMHG | RESPIRATION RATE: 16 BRPM | HEIGHT: 72 IN | DIASTOLIC BLOOD PRESSURE: 66 MMHG

## 2024-05-01 DIAGNOSIS — R20.2 PARESTHESIA OF BOTH FEET: Primary | ICD-10-CM

## 2024-05-01 DIAGNOSIS — R73.09 ELEVATED GLUCOSE: ICD-10-CM

## 2024-05-01 DIAGNOSIS — R09.89 OTHER SPECIFIED SYMPTOMS AND SIGNS INVOLVING THE CIRCULATORY AND RESPIRATORY SYSTEMS: ICD-10-CM

## 2024-05-01 DIAGNOSIS — I10 ESSENTIAL HYPERTENSION WITH GOAL BLOOD PRESSURE LESS THAN 140/90: ICD-10-CM

## 2024-05-01 DIAGNOSIS — Z00.00 ENCOUNTER FOR MEDICARE ANNUAL WELLNESS EXAM: Primary | ICD-10-CM

## 2024-05-01 DIAGNOSIS — R39.15 URINARY URGENCY: ICD-10-CM

## 2024-05-01 DIAGNOSIS — R20.2 PARESTHESIA OF BOTH FEET: ICD-10-CM

## 2024-05-01 DIAGNOSIS — N18.30 STAGE 3 CHRONIC KIDNEY DISEASE, UNSPECIFIED WHETHER STAGE 3A OR 3B CKD (H): ICD-10-CM

## 2024-05-01 DIAGNOSIS — E78.5 HYPERLIPIDEMIA LDL GOAL <130: ICD-10-CM

## 2024-05-01 DIAGNOSIS — I87.2 VENOUS (PERIPHERAL) INSUFFICIENCY: ICD-10-CM

## 2024-05-01 DIAGNOSIS — R23.8 DUSKY FEET: ICD-10-CM

## 2024-05-01 DIAGNOSIS — R06.83 SNORING: ICD-10-CM

## 2024-05-01 LAB
HBA1C MFR BLD: 5.8 % (ref 0–5.6)
HGB BLD-MCNC: 13.8 G/DL (ref 13.3–17.7)

## 2024-05-01 PROCEDURE — G0439 PPPS, SUBSEQ VISIT: HCPCS | Performed by: FAMILY MEDICINE

## 2024-05-01 PROCEDURE — 83036 HEMOGLOBIN GLYCOSYLATED A1C: CPT | Performed by: FAMILY MEDICINE

## 2024-05-01 PROCEDURE — 80053 COMPREHEN METABOLIC PANEL: CPT | Performed by: FAMILY MEDICINE

## 2024-05-01 PROCEDURE — 82043 UR ALBUMIN QUANTITATIVE: CPT | Performed by: FAMILY MEDICINE

## 2024-05-01 PROCEDURE — 85018 HEMOGLOBIN: CPT | Performed by: FAMILY MEDICINE

## 2024-05-01 PROCEDURE — 82607 VITAMIN B-12: CPT | Performed by: FAMILY MEDICINE

## 2024-05-01 PROCEDURE — 84443 ASSAY THYROID STIM HORMONE: CPT | Performed by: FAMILY MEDICINE

## 2024-05-01 PROCEDURE — 82570 ASSAY OF URINE CREATININE: CPT | Performed by: FAMILY MEDICINE

## 2024-05-01 PROCEDURE — 99214 OFFICE O/P EST MOD 30 MIN: CPT | Mod: 25 | Performed by: FAMILY MEDICINE

## 2024-05-01 PROCEDURE — 36415 COLL VENOUS BLD VENIPUNCTURE: CPT | Performed by: FAMILY MEDICINE

## 2024-05-01 PROCEDURE — 80061 LIPID PANEL: CPT | Performed by: FAMILY MEDICINE

## 2024-05-01 RX ORDER — MIRABEGRON 25 MG/1
25 TABLET, FILM COATED, EXTENDED RELEASE ORAL DAILY
Qty: 90 TABLET | Refills: 1 | Status: SHIPPED | OUTPATIENT
Start: 2024-05-01

## 2024-05-01 RX ORDER — LISINOPRIL 10 MG/1
10 TABLET ORAL DAILY
Qty: 90 TABLET | Refills: 3 | Status: SHIPPED | OUTPATIENT
Start: 2024-05-01 | End: 2024-06-24

## 2024-05-01 RX ORDER — SIMVASTATIN 40 MG
40 TABLET ORAL AT BEDTIME
Qty: 90 TABLET | Refills: 3 | Status: SHIPPED | OUTPATIENT
Start: 2024-05-01

## 2024-05-01 RX ORDER — RESPIRATORY SYNCYTIAL VIRUS VACCINE 120MCG/0.5
0.5 KIT INTRAMUSCULAR ONCE
Qty: 1 EACH | Refills: 0 | Status: CANCELLED | OUTPATIENT
Start: 2024-05-01 | End: 2024-05-01

## 2024-05-01 SDOH — HEALTH STABILITY: PHYSICAL HEALTH: ON AVERAGE, HOW MANY DAYS PER WEEK DO YOU ENGAGE IN MODERATE TO STRENUOUS EXERCISE (LIKE A BRISK WALK)?: 0 DAYS

## 2024-05-01 ASSESSMENT — SOCIAL DETERMINANTS OF HEALTH (SDOH): HOW OFTEN DO YOU GET TOGETHER WITH FRIENDS OR RELATIVES?: TWICE A WEEK

## 2024-05-01 ASSESSMENT — PAIN SCALES - GENERAL: PAINLEVEL: NO PAIN (0)

## 2024-05-01 NOTE — PROGRESS NOTES
Preventive Care Visit  [facility]  Fern Sheppard MD Family Medicine  May 1, 2024      Assessment & Plan       ICD-10-CM    1. Encounter for Medicare annual wellness exam  Z00.00       2. Essential hypertension with goal blood pressure less than 140/90  I10 Comprehensive metabolic panel (BMP + Alb, Alk Phos, ALT, AST, Total. Bili, TP)     lisinopril (ZESTRIL) 10 MG tablet     Comprehensive metabolic panel (BMP + Alb, Alk Phos, ALT, AST, Total. Bili, TP)      3. Hyperlipidemia LDL goal <130  E78.5 Comprehensive metabolic panel (BMP + Alb, Alk Phos, ALT, AST, Total. Bili, TP)     Lipid panel reflex to direct LDL Fasting     simvastatin (ZOCOR) 40 MG tablet     Comprehensive metabolic panel (BMP + Alb, Alk Phos, ALT, AST, Total. Bili, TP)     Lipid panel reflex to direct LDL Fasting      4. Stage 3 chronic kidney disease, unspecified whether stage 3a or 3b CKD (H)  N18.30 Comprehensive metabolic panel (BMP + Alb, Alk Phos, ALT, AST, Total. Bili, TP)     Albumin Random Urine Quantitative with Creat Ratio     Hemoglobin     Comprehensive metabolic panel (BMP + Alb, Alk Phos, ALT, AST, Total. Bili, TP)     Albumin Random Urine Quantitative with Creat Ratio     Hemoglobin      5. Dusky feet  R23.8 Vascular Surgery Referral     Vascular Surgery Referral      6. Urinary urgency  R39.15 mirabegron (MYRBETRIQ) 25 MG 24 hr tablet      7. Paresthesia of both feet  R20.2 Vitamin B12     Vitamin B12     TSH with free T4 reflex      8. Snoring  R06.83 Adult Sleep Eval & Management  Referral      9. Venous (peripheral) insufficiency  I87.2 Vascular Surgery Referral     Vascular Surgery Referral      10. Elevated glucose  R73.09 Hemoglobin A1c     Hemoglobin A1c         Medicare annual wellness visit, subsequent  Healthy  He had a negative Cologuard test 2022  Fasting labs today.  Immunizations: I recommended Shingrix vaccine, RSV vaccine and COVID vaccine recommended - he declined today     He  had a full skin exam last May - he has an appointment with Dr. Lobo in June.       Essential hypertension with goal blood pressure less than 140/90  Controlled  CMP today and urine albumin  He was previously on HCTZ, stopped 5/2015. Continues on lisinopril 10 mg.  - lisinopril (PRINIVIL/ZESTRIL) 10 MG tablet; Take 1 tablet (10 mg) by mouth daily  Dispense: 90 tablet; Refill: 3      CKD (chronic kidney disease) stage 3, GFR 30-59 ml/min  Stable.  continues ACEI  - Comprehensive metabolic panel  hgb today   - Albumin Random Urine Quantitative     Nocturia  Urinary urgency     Saw urology 7/2023 - note reviewed today   Reducing carbonated caffeinated beverage at night has not helped.   Will try myrbetriq starting at 25mg daily. He will follow up in a month for BP recheck given possible increased BP with the medication.     He continues to have nocturia once to three times nightly, that has been stable since going off HCTZ in 2015.  Did not notice a difference with flomax.       Snoring and nocturia  Sleep eval recommended and referral provided today      Hyperlipidemia LDL goal <130  Stable. Continues simvastatin 40 mg.  - simvastatin (ZOCOR) 40 MG tablet; Take 1 tablet (40 mg) by mouth At Bedtime For cholesterol  Dispense: 90 tablet; Refill: 3  - Lipid panel reflex to direct LD          Venous insufficiency  Dusky feet  Vascular referral given today     Numbness bilateral feet  May be secondary to poor circulation.  Reduced, but not absent sensation with monofilament on exam today.   Advised checking his feet every day.   Will check b12, A1c, cmp, TSH     Elevated glucose  Check a1c      Counseling  Appropriate preventive services were discussed with this patient, including applicable screening as appropriate for fall prevention, nutrition, physical activity, Tobacco-use cessation, weight loss and cognition.  Checklist reviewing preventive services available has been given to the patient.  Reviewed patient's diet,  addressing concerns and/or questions.   The patient was provided with written information regarding signs of hearing loss.   Information on urinary incontinence and treatment options given to patient.           Subjective   Al is a 78 year old, presenting for the following:  Annual Visit        5/1/2024     9:46 AM   Additional Questions   Roomed by Elva SHEPHERD   Accompanied by self         Health Care Directive  Patient does not have a Health Care Directive or Living Will: Patient states has Advance Directive and will bring in a copy to clinic.    HPI     Feet bother him. Feels like they are numb at the toes. Can feel pressure, but skin sensation is different. No pain. Disturbing sensation. Sometimes when he is sleeping he will stretch and then it is more in his shin that he feels pain. Does notice reduced sensation even up to the lower ankle. A little more prominent on the left than the right. He has not noticed any particular skin changes, though says his left foot has always been kind of dark since he broke his ankle. Denies any trouble with walking. Maybe if standing up right away after sitting for awhile. He says the numbness has been present for years.     Sleeping continues to be disturbed by waking up at night to urinate. Did not find the urology visit helpful.     He notes he feels like he has to urinate all day long as well as night. Every couple of hours has to urinate daytime and night. He bought the sleeping pill that the urologist recommended, but he did not notice benefit at all.     Noticed this at a wedding a few years ago, felt fine until he got to the car, then had a significant sense of urgency. Often happens when he is working. 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 He has a very urgent sensation.         5/1/2024   General Health   How would you rate your overall physical health? Good   Feel stress (tense, anxious, or unable to sleep) Not at all          5/1/2024   Nutrition   Diet: Low salt         5/1/2024   Exercise   Days per week of moderate/strenous exercise 0 days   (!) EXERCISE CONCERN      5/1/2024   Social Factors   Frequency of gathering with friends or relatives Twice a week   Worry food won't last until get money to buy more No   Food not last or not have enough money for food? No   Do you have housing?  Yes   Are you worried about losing your housing? No   Lack of transportation? No   Unable to get utilities (heat,electricity)? No         5/1/2024   Fall Risk   Fallen 2 or more times in the past year? No   Trouble with walking or balance? No          5/1/2024   Activities of Daily Living- Home Safety   Needs help with the following daily activites None of the above   Safety concerns in the home None of the above         5/1/2024   Dental   Dentist two times every year? Yes         5/1/2024   Hearing Screening   Hearing concerns? (!) I NEED TO ASK PEOPLE TO SPEAK UP OR REPEAT THEMSELVES.    (!) IT'S HARD TO FOLLOW A CONVERSATION IN A NOISY RESTAURANT OR CROWDED ROOM.         5/1/2024   Driving Risk Screening   Patient/family members have concerns about driving No         5/1/2024   General Alertness/Fatigue Screening   Have you been more tired than usual lately? No         5/1/2024   Urinary Incontinence Screening   Bothered by leaking urine in past 6 months Yes         5/1/2024   TB Screening   Were you born outside of the [place]? No         Today's PHQ-2 Score:       5/1/2024     9:45 AM   PHQ-2 ( 1999 [employer])   Q1: Little interest or pleasure in doing things 0   Q2: Feeling down, depressed or hopeless 0   PHQ-2 Score 0   Q1: Little interest or pleasure in doing things Not at all   Q2: Feeling down, depressed or hopeless Not at all   PHQ-2 Score 0           5/1/2024   Substance Use   Alcohol more than 3/day or more than 7/wk No   Do you have a current opioid prescription? No   How severe/bad is pain from 1 to 10? 2/10   Do you use any other substances  recreationally? No     Social History     Tobacco Use    Smoking status: Never    Smokeless tobacco: Never   Vaping Use    Vaping status: Never Used   Substance Use Topics    Alcohol use: No    Drug use: No       ASCVD Risk   The 10-year ASCVD risk score (Manuel GUADALUPE, et al., 2019) is: 33.1%    Values used to calculate the score:      Age: 78 years      Sex: Male      Is Non- : No      Diabetic: No      Tobacco smoker: No      Systolic Blood Pressure: 133 mmHg      Is BP treated: Yes      HDL Cholesterol: 84 mg/dL      Total Cholesterol: 187 mg/dL            Reviewed and updated as needed this visit by Provider                    Past Medical History:   Diagnosis Date    Advanced directives, counseling/discussion 5/10/2012    Discussed advance care planning with patient; information given to patient to review. 5/10/2012       Hyperlipidemia LDL goal <130 5/9/2010    Hypertension goal BP (blood pressure) < 140/90 4/20/2011    Other abnormal glucose 2006     Prediabetes per diabetes teachers    Syncope and collapse 2005     [place] [place],while biking     Past Surgical History:   Procedure Laterality Date    HC REMOVAL OF TONSILS,<13 Y/O  1949    STRESS ECHO (METRO)  1/06    passed    ZZHC COLONOSCOPY THRU STOMA, DIAGNOSTIC  2/5/2008    Normal     Current providers sharing in care for this patient include:  Patient Care Team:  Fern Sheppard MD as PCP - General (Family Practice)  Fern Sheppard MD as Assigned PCP  Tavo Taveras PA-C as Physician Assistant (Physician Assistant - Medical)  Tavo Taveras PA-C as Assigned Surgical Provider    The following health maintenance items are reviewed in Epic and correct as of today:  Health Maintenance   Topic Date Due    ZOSTER IMMUNIZATION (1 of 2) Never done    RSV VACCINE (Pregnancy & 60+) (1 - 1-dose 60+ series) Never done    COVID-19 Vaccine (4 - 2023-24 season) 09/01/2023    BMP  02/13/2024    CMP  02/13/2024    LIPID  02/13/2024  "   MICROALBUMIN  02/13/2024    ANNUAL REVIEW OF HM ORDERS  02/13/2024    INFLUENZA VACCINE (Season Ended) 09/01/2024    MEDICARE ANNUAL WELLNESS VISIT  05/01/2025    FALL RISK ASSESSMENT  05/01/2025    HEMOGLOBIN  05/01/2025    GLUCOSE  02/13/2026    DTAP/TDAP/TD IMMUNIZATION (3 - Td or Tdap) 09/11/2028    ADVANCE CARE PLANNING  05/01/2029    HEPATITIS C SCREENING  Completed    PHQ-2 (once per calendar year)  Completed    Pneumococcal Vaccine: 65+ Years  Completed    URINALYSIS  Completed    IPV IMMUNIZATION  Aged Out    HPV IMMUNIZATION  Aged Out    MENINGITIS IMMUNIZATION  Aged Out    RSV MONOCLONAL ANTIBODY  Aged Out    COLORECTAL CANCER SCREENING  Discontinued           Objective    Exam  /66 (BP Location: Right arm, Patient Position: Sitting, Cuff Size: Adult Regular)   Pulse 66   Temp 97  F (36.1  C) (Temporal)   Resp 16   Ht 1.83 m (6' 0.05\")   Wt 61.7 kg (136 lb)   SpO2 99%   BMI 18.42 kg/m     Estimated body mass index is 18.42 kg/m  as calculated from the following:    Height as of this encounter: 1.83 m (6' 0.05\").    Weight as of this encounter: 61.7 kg (136 lb).    Physical Exam  GENERAL: alert and no distress  EYES: Eyes grossly normal to inspection, PERRL and conjunctivae and sclerae normal  HENT: ear canals and TM's normal, nose and mouth without ulcers or lesions  NECK: no adenopathy, no asymmetry, masses, or scars  RESP: lungs clear to auscultation - no rales, rhonchi or wheezes  CV: regular rate and rhythm, normal S1 S2, no S3 or S4, no murmur, click or rub, no peripheral edema  ABDOMEN: soft, nontender, no hepatosplenomegaly, no masses and bowel sounds normal  MS: no gross musculoskeletal defects noted, no edema  SKIN: no suspicious lesions or rashes  NEURO: Normal strength and tone, mentation intact and speech normal  PSYCH: mentation appears normal, affect normal/bright  Diabetic foot exam: DP reduced bilateral, skin is warm, cap refill normal, reduced sensation at distal toes " bilaterally with monofilament, and venous stasis changes present, varicose veins at ankles.          5/1/2024   Mini Cog   Clock Draw Score 2 Normal   3 Item Recall 1 object recalled   Mini Cog Total Score 3              Signed Electronically by: Fern Sheppard MD

## 2024-05-01 NOTE — PATIENT INSTRUCTIONS
Start myrbetriq once daily for help with the frequent urge and urgency to urinate.   Schedule a blood pressure check in a month to make sure your blood pressure stays in reasonable range on the myrbetriq (it can sometimes cause higher blood pressure).   Schedule with the sleep doctor about your snoring and difficulty sleeping at night. If you are able to sleep better after starting the myrbetriq you do not need to see the sleep doctor.     I recommend getting the new RSV vaccine and the shingles vaccine at a local pharmacy. I also recommend the newest COVID vaccine if you are open to it.   Schedule with the vascular specialist for your feet.    Preventive Care Advice   This is general advice given by our system to help you stay healthy. However, your care team may have specific advice just for you. Please talk to your care team about your preventive care needs.  Nutrition  Eat 5 or more servings of fruits and vegetables each day.  Try wheat bread, brown rice and whole grain pasta (instead of white bread, rice, and pasta).  Get enough calcium and vitamin D. Check the label on foods and aim for 100% of the RDA (recommended daily allowance).  Lifestyle  Exercise at least 150 minutes each week   (30 minutes a day, 5 days a week).  Do muscle strengthening activities 2 days a week. These help control your weight and prevent disease.  No smoking.  Wear sunscreen to prevent skin cancer.  Have a dental exam and cleaning every 6 months.  Yearly exams  See your health care team every year to talk about:  Any changes in your health.  Any medicines your care team has prescribed.  Preventive care, family planning, and ways to prevent chronic diseases.  Shots (vaccines)   HPV shots (up to age 26), if you've never had them before.  Hepatitis B shots (up to age 59), if you've never had them before.  COVID-19 shot: Get this shot when it's due.  Flu shot: Get a flu shot every year.  Tetanus shot: Get a tetanus shot every 10  years.  Pneumococcal, hepatitis A, and RSV shots: Ask your care team if you need these based on your risk.  Shingles shot (for age 50 and up).  General health tests  Diabetes screening:  Starting at age 35, Get screened for diabetes at least every 3 years.  If you are younger than age 35, ask your care team if you should be screened for diabetes.  Cholesterol test: At age 39, start having a cholesterol test every 5 years, or more often if advised.  Bone density scan (DEXA): At age 50, ask your care team if you should have this scan for osteoporosis (brittle bones).  Hepatitis C: Get tested at least once in your life.  STIs (sexually transmitted infections)  Before age 24: Ask your care team if you should be screened for STIs.  After age 24: Get screened for STIs if you're at risk. You are at risk for STIs (including HIV) if:  You are sexually active with more than one person.  You don't use condoms every time.  You or a partner was diagnosed with a sexually transmitted infection.  If you are at risk for HIV, ask about PrEP medicine to prevent HIV.  Get tested for HIV at least once in your life, whether you are at risk for HIV or not.  Cancer screening tests  Cervical cancer screening: If you have a cervix, begin getting regular cervical cancer screening tests at age 21. Most people who have regular screenings with normal results can stop after age 65. Talk about this with your provider.  Breast cancer scan (mammogram): If you've ever had breasts, begin having regular mammograms starting at age 40. This is a scan to check for breast cancer.  Colon cancer screening: It is important to start screening for colon cancer at age 45.  Have a colonoscopy test every 10 years (or more often if you're at risk) Or, ask your provider about stool tests like a FIT test every year or Cologuard test every 3 years.  To learn more about your testing options, visit: https://www.[employer]/981617.pdf.  For help making a decision, visit:  https://bit.kapturem/xb98759.  Prostate cancer screening test: If you have a prostate and are age 55 to 69, ask your provider if you would benefit from a yearly prostate cancer screening test.  Lung cancer screening: If you are a current or former smoker age 50 to 80, ask your care team if ongoing lung cancer screenings are right for you.  For informational purposes only. Not to replace the advice of your health care provider. Copyright   2023 [employer]. All rights reserved. Clinically reviewed by the  [facility] [employer] [facility]. [employer] [id number] - REV 01/24.    Hearing Loss: Care Instructions  Overview     Hearing loss is a sudden or slow decrease in how well you hear. It can range from slight to profound. Permanent hearing loss can occur with aging. It also can happen when you are exposed long-term to loud noise. Examples include listening to loud music, riding motorcycles, or being around other loud machines.  Hearing loss can affect your work and home life. It can make you feel lonely or depressed. You may feel that you have lost your independence. But hearing aids and other devices can help you hear better and feel connected to others.  Follow-up care is a key part of your treatment and safety. Be sure to make and go to all appointments, and call your doctor if you are having problems. It's also a good idea to know your test results and keep a list of the medicines you take.  How can you care for yourself at home?  Avoid loud noises whenever possible. This helps keep your hearing from getting worse.  Always wear hearing protection around loud noises.  Wear a hearing aid as directed.  A professional can help you pick a hearing aid that will work best for you.  You can also get hearing aids over the counter for mild to moderate hearing loss.  Have hearing tests as your doctor suggests. They can show whether your hearing has changed. Your hearing aid may need to be adjusted.  Use other devices  "as needed. These may include:  Telephone amplifiers and hearing aids that can connect to a television, stereo, radio, or microphone.  Devices that use lights or vibrations. These alert you to the doorbell, a ringing telephone, or a baby monitor.  Television closed-captioning. This shows the words at the bottom of the screen. Most new TVs can do this.  TTY (text telephone). This lets you type messages back and forth on the telephone instead of talking or listening. These devices are also called TDD. When messages are typed on the keyboard, they are sent over the phone line to a receiving TTY. The message is shown on a monitor.  Use text messaging, social media, and email if it is hard for you to communicate by telephone.  Try to learn a listening technique called speechreading. It is not lipreading. You pay attention to people's gestures, expressions, posture, and tone of voice. These clues can help you understand what a person is saying. Face the person you are talking to, and have them face you. Make sure the lighting is good. You need to see the other person's face clearly.  Think about counseling if you need help to adjust to your hearing loss.  When should you call for help?  Watch closely for changes in your health, and be sure to contact your doctor if:    You think your hearing is getting worse.     You have new symptoms, such as dizziness or nausea.   Where can you learn more?  Go to https://www.[employer].net/patiented  Enter R798 in the search box to learn more about \"Hearing Loss: Care Instructions.\"  Current as of: September 27, 2023               Content Version: 14.0    0316-8412 [employer].   Care instructions adapted under license by your healthcare professional. If you have questions about a medical condition or this instruction, always ask your healthcare professional. [employer] disclaims any warranty or liability for your use of this information.      Bladder Training: " Care Instructions  Your Care Instructions     Bladder training is used to treat urge incontinence and stress incontinence. Urge incontinence means that the need to urinate comes on so fast that you can't get to a toilet in time. Stress incontinence means that you leak urine because of pressure on your bladder. For example, it may happen when you laugh, cough, or lift something heavy.  Bladder training can increase how long you can wait before you have to urinate. It can also help your bladder hold more urine. And it can give you better control over the urge to urinate.  It is important to remember that bladder training takes a few weeks to a few months to make a difference. You may not see results right away, but don't give up.  Follow-up care is a key part of your treatment and safety. Be sure to make and go to all appointments, and call your doctor if you are having problems. It's also a good idea to know your test results and keep a list of the medicines you take.  How can you care for yourself at home?  Work with your doctor to come up with a bladder training program that is right for you. You may use one or more of the following methods.  Delayed urination  In the beginning, try to keep from urinating for 5 minutes after you first feel the need to go.  While you wait, take deep, slow breaths to relax. Kegel exercises can also help you delay the need to go to the bathroom.  After some practice, when you can easily wait 5 minutes to urinate, try to wait 10 minutes before you urinate.  Slowly increase the waiting period until you are able to control when you have to urinate.  Scheduled urination  Empty your bladder when you first wake up in the morning.  Schedule times throughout the day when you will urinate.  Start by going to the bathroom every hour, even if you don't need to go.  Slowly increase the time between trips to the bathroom.  When you have found a schedule that works well for you, keep doing it.  If  "you wake up during the night and have to urinate, do it. Apply your schedule to waking hours only.  Kegel exercises  These tighten and strengthen pelvic muscles, which can help you control the flow of urine. (If doing these exercises causes pain, stop doing them and talk with your doctor.) To do Kegel exercises:  Squeeze your muscles as if you were trying not to pass gas. Or squeeze your muscles as if you were stopping the flow of urine. Your belly, legs, and buttocks shouldn't move.  Hold the squeeze for 3 seconds, then relax for 5 to 10 seconds.  Start with 3 seconds, then add 1 second each week until you are able to squeeze for 10 seconds.  Repeat the exercise 10 times a session. Do 3 to 8 sessions a day.  When should you call for help?  Watch closely for changes in your health, and be sure to contact your doctor if:    Your incontinence is getting worse.     You do not get better as expected.   Where can you learn more?  Go to https://www.[employer].net/patiented  Enter V684 in the search box to learn more about \"Bladder Training: Care Instructions.\"  Current as of: November 15, 2023               Content Version: 14.0    8482-2658 [employer].   Care instructions adapted under license by your healthcare professional. If you have questions about a medical condition or this instruction, always ask your healthcare professional. [employer] disclaims any warranty or liability for your use of this information.      "

## 2024-05-02 ENCOUNTER — TELEPHONE (OUTPATIENT)
Dept: OTHER | Facility: CLINIC | Age: 79
End: 2024-05-02
Payer: COMMERCIAL

## 2024-05-02 LAB
ALBUMIN SERPL BCG-MCNC: 4.2 G/DL (ref 3.5–5.2)
ALP SERPL-CCNC: 81 U/L (ref 40–150)
ALT SERPL W P-5'-P-CCNC: 22 U/L (ref 0–70)
ANION GAP SERPL CALCULATED.3IONS-SCNC: 10 MMOL/L (ref 7–15)
AST SERPL W P-5'-P-CCNC: 24 U/L (ref 0–45)
BILIRUB SERPL-MCNC: 0.9 MG/DL
BUN SERPL-MCNC: 26.2 MG/DL (ref 8–23)
CALCIUM SERPL-MCNC: 9.4 MG/DL (ref 8.8–10.2)
CHLORIDE SERPL-SCNC: 106 MMOL/L (ref 98–107)
CHOLEST SERPL-MCNC: 175 MG/DL
CREAT SERPL-MCNC: 1.36 MG/DL (ref 0.67–1.17)
CREAT UR-MCNC: 209 MG/DL
DEPRECATED HCO3 PLAS-SCNC: 26 MMOL/L (ref 22–29)
EGFRCR SERPLBLD CKD-EPI 2021: 53 ML/MIN/1.73M2
FASTING STATUS PATIENT QL REPORTED: YES
GLUCOSE SERPL-MCNC: 102 MG/DL (ref 70–99)
HDLC SERPL-MCNC: 76 MG/DL
LDLC SERPL CALC-MCNC: 88 MG/DL
MICROALBUMIN UR-MCNC: 21.3 MG/L
MICROALBUMIN/CREAT UR: 10.19 MG/G CR (ref 0–17)
NONHDLC SERPL-MCNC: 99 MG/DL
POTASSIUM SERPL-SCNC: 4.8 MMOL/L (ref 3.4–5.3)
PROT SERPL-MCNC: 6.6 G/DL (ref 6.4–8.3)
SODIUM SERPL-SCNC: 142 MMOL/L (ref 135–145)
TRIGL SERPL-MCNC: 54 MG/DL
TSH SERPL DL<=0.005 MIU/L-ACNC: 2.58 UIU/ML (ref 0.3–4.2)
VIT B12 SERPL-MCNC: 634 PG/ML (ref 232–1245)

## 2024-05-02 NOTE — TELEPHONE ENCOUNTER
Referral received via [employer] on 5/2/24.    Pt referred to [facility] by Fern Sheppard MD for dusky feet and Venous (peripheral) insufficiency.     Patient is already scheduled at [facility] clinic with Dr. Yadav on 7/5/24    Debbie LI, RN    [facility]  [facility]  Office: [phone]  Fax: [phone]

## 2024-05-03 ENCOUNTER — TELEPHONE (OUTPATIENT)
Dept: FAMILY MEDICINE | Facility: CLINIC | Age: 79
End: 2024-05-03
Payer: COMMERCIAL

## 2024-05-03 NOTE — TELEPHONE ENCOUNTER
Patient calling to inquire if there is a generic alternative to Myrbetriq. Advisd that there is not - also that pharmacies will automatically fill the cheaper alternative version of a medication if it is available and the prescription isn't written as KRYSTLE.    Pauline Montano, RN, BSN, PHN  [facility]

## 2024-05-16 ENCOUNTER — TRANSFERRED RECORDS (OUTPATIENT)
Dept: HEALTH INFORMATION MANAGEMENT | Facility: CLINIC | Age: 79
End: 2024-05-16

## 2024-05-20 ENCOUNTER — ANCILLARY PROCEDURE (OUTPATIENT)
Dept: ULTRASOUND IMAGING | Facility: CLINIC | Age: 79
End: 2024-05-20
Attending: HOSPITALIST
Payer: COMMERCIAL

## 2024-05-20 DIAGNOSIS — R20.2 PARESTHESIA OF BOTH FEET: ICD-10-CM

## 2024-05-20 DIAGNOSIS — R09.89 OTHER SPECIFIED SYMPTOMS AND SIGNS INVOLVING THE CIRCULATORY AND RESPIRATORY SYSTEMS: ICD-10-CM

## 2024-05-20 PROCEDURE — 93924 LWR XTR VASC STDY BILAT: CPT | Performed by: RADIOLOGY

## 2024-06-03 ENCOUNTER — ALLIED HEALTH/NURSE VISIT (OUTPATIENT)
Dept: FAMILY MEDICINE | Facility: CLINIC | Age: 79
End: 2024-06-03
Payer: COMMERCIAL

## 2024-06-03 VITALS — DIASTOLIC BLOOD PRESSURE: 64 MMHG | HEART RATE: 63 BPM | SYSTOLIC BLOOD PRESSURE: 147 MMHG

## 2024-06-03 DIAGNOSIS — I10 ESSENTIAL HYPERTENSION WITH GOAL BLOOD PRESSURE LESS THAN 140/90: Primary | ICD-10-CM

## 2024-06-03 PROCEDURE — 99207 PR NO CHARGE NURSE ONLY: CPT

## 2024-06-03 NOTE — Clinical Note
DARIAN /69, 147/64. Huddled with Dr. Macdonald with recommendation to increase lisinopril to 20 mg and return in 1 month for a bp check. See encounter. Thanks

## 2024-06-03 NOTE — PROGRESS NOTES
Chavez Perkins is a 78 year old year old patient who comes in today for a Blood Pressure check because of new medication and ongoing blood pressure monitoring.    Vital Signs as repeated by RN.   /69, HR 64         /64, HR 63  Patient is taking medication as prescribed  Patient is tolerating medications well.  Patient is not monitoring Blood Pressure at home.    Current complaints: none    Disposition: Writer provided education on healthy lifestyle changes, medication side effects, and signs/symptoms to report to healthcare provider. Huddled with provider with recommendation to increase lisinopril to 20 mg and return in one month for a bp check. Pt verbalized understanding and in agreement with plan.    Thom Wolfe RN  [facility]

## 2024-06-05 ENCOUNTER — TRANSFERRED RECORDS (OUTPATIENT)
Dept: HEALTH INFORMATION MANAGEMENT | Facility: CLINIC | Age: 79
End: 2024-06-05
Payer: COMMERCIAL

## 2024-06-24 DIAGNOSIS — I10 ESSENTIAL HYPERTENSION WITH GOAL BLOOD PRESSURE LESS THAN 140/90: ICD-10-CM

## 2024-06-26 RX ORDER — LISINOPRIL 10 MG/1
20 TABLET ORAL DAILY
Qty: 90 TABLET | Refills: 2 | Status: SHIPPED | OUTPATIENT
Start: 2024-06-26

## 2024-06-26 NOTE — TELEPHONE ENCOUNTER
Dr. Sheppard: pended updated sig from 6/3 BP check    Sandie RUBIO, BSN, PHN, RN  [facility]  [phone]

## 2024-07-03 ENCOUNTER — ALLIED HEALTH/NURSE VISIT (OUTPATIENT)
Dept: FAMILY MEDICINE | Facility: CLINIC | Age: 79
End: 2024-07-03
Payer: COMMERCIAL

## 2024-07-03 VITALS — HEART RATE: 66 BPM | SYSTOLIC BLOOD PRESSURE: 138 MMHG | OXYGEN SATURATION: 100 % | DIASTOLIC BLOOD PRESSURE: 64 MMHG

## 2024-07-03 DIAGNOSIS — I10 HTN (HYPERTENSION): Primary | ICD-10-CM

## 2024-07-03 PROCEDURE — 99207 PR NO CHARGE NURSE ONLY: CPT

## 2024-07-03 ASSESSMENT — PAIN SCALES - GENERAL: PAINLEVEL: NO PAIN (0)

## 2024-07-03 NOTE — PROGRESS NOTES
"Chavez Perkins is a 78 year old year old patient who comes in today for a Blood Pressure check because of medication change. From lisinopril 10 mg to 20 staring 6/6    Vital Signs as repeated by RN   Automatic /72 P 68 Right arm                                                                      /68  P 69  right arm                                                                      /67  P 69  left arm                                                         Manual   /56 Right arm                                                                      /64 Left arm AP 66 regular    Patient is taking medication as prescribed  Patient is not tolerating medications well.  Patient is not monitoring Blood Pressure at home.    Current complaints: \"dopey\" \" I don't mean I can't think\" but it is bothering him, Wants to map more than before increasing the dose a little bit dizzy, he has felt this way the whole month, not decreasing with time.     Disposition:  huddled with provider  Per Dr Lima, start taking lisinopril 10 mg in the morning and 10 mg in the evening  Recheck with RN in 2-4 weeks    Pt agrees to plan and follow up appt with RN made in 3 weeks.     Mai Zhang RN, BSN  [facility]             "

## 2024-07-05 ENCOUNTER — OFFICE VISIT (OUTPATIENT)
Dept: VASCULAR SURGERY | Facility: CLINIC | Age: 79
End: 2024-07-05
Payer: COMMERCIAL

## 2024-07-05 VITALS — OXYGEN SATURATION: 99 % | HEART RATE: 66 BPM | DIASTOLIC BLOOD PRESSURE: 69 MMHG | SYSTOLIC BLOOD PRESSURE: 144 MMHG

## 2024-07-05 DIAGNOSIS — I87.2 VENOUS (PERIPHERAL) INSUFFICIENCY: Primary | ICD-10-CM

## 2024-07-05 DIAGNOSIS — R23.8 DUSKY FEET: ICD-10-CM

## 2024-07-05 PROCEDURE — 99203 OFFICE O/P NEW LOW 30 MIN: CPT | Performed by: HOSPITALIST

## 2024-07-05 ASSESSMENT — PAIN SCALES - GENERAL: PAINLEVEL: MODERATE PAIN (5)

## 2024-07-05 NOTE — LETTER
7/5/2024       RE: Chavez Perkins  [address]  [place] [address]     Dear Colleague,    Thank you for referring your patient, Chavez Perkins, to the [facility] [place] at [facility]. Please see a copy of my visit note below.    VASCULAR MEDICINE CONSULT NOTE          LOCATION:  [facility] CL[facility]        Date of Service: 7/5/2024      Primary Care Provider: Fern Sheppard  Referring provider;  Fern Sheppard      Reason for the visit/chief complaint:   Purplish discoloration of the feet      HPI:  Chavez Perkins is a pleasant 78 year old male who presents to our Vascular Medicine clinic for the above mentioned reason.    Mr. Perkins has past medical history significant for hypertension, CKD stage III and prediabetes.    Patient himself reports that he does not have major complaints about his feet other than some cramping of his toes.  He was referred to us by his PCP due to dusky feet.    Per Mr. Perkins, he denied any pain in his feet.  He had noted discoloration although it does not seem that it has bothered him in the past.  Denies any swelling subjectively.  Denies sense of heaviness.  Denies ever trying to elevate legs or assess if the color would lighten up.  Also denies ever wearing compression stockings for  Denies previous history of DVT, varicose veins or superficial vein thrombosis to both lower extremities.  His BMI is 18 and denies ever being overweight.  Never smoked.  Denies family history of swelling, lymphedema or venous insufficiency.    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  Frequently, he works many hours including nights when finishing up projects.  He has done this for the majority of his life.  Previously, was working at his office and most recently transition to work at home.  He never necessarily puts his legs  up.  He does not have any regular exercise routine.    He completed an exercise JUDY study with completely normal JUDY pre and post exercise on 5/20/2024.  Doppler waveforms were as well normal.      REVIEW OF SYSTEMS:    A 12 point ROS was reviewed and is negative except what is mentioned in HPI.       Past medical history, surgical history, medications, family history, social history and allergies were reviewed. Pertinent points mentioned under HPI.      OBJECTIVE:    Vital signs:  BP (!) 144/69 (BP Location: Right arm, Patient Position: Chair, Cuff Size: Adult Small)   Pulse 66   SpO2 99%   Wt Readings from Last 1 Encounters:   05/01/24 136 lb     There is no height or weight on file to calculate BMI.    Physical exam:  General appearance: Pleasant male in no apparent distress.    HEENT: NC/AT.    Neck: Carotids +2/2 bilaterally without bruits.  No jugular venous distension.   Heart: RRR. Normal S1, S2. No murmur, rub, click, or gallop.   Chest: Clear to auscultation bilaterally.  Abdomen: Soft, nontender, nondistended. No pulsatile mass.  No bruits.   Extremities and skin: Bilateral lower extremity with no significant swelling apart from very mild pedal pitting edema on the left.  Few reticular veins noted bilaterally especially posteriorly.  No varicose veins  Hemosiderin deposition noted bilaterally with corona phlebectatica.  No lipodermatosclerosis or skin wounds.  Palpable DP and PT 2/2 bilaterally  Neurological: Alert, awake and oriented           DIAGNOSTIC STUDIES:   Labs and diagnostics reviewed including outside records. Pertinent points are mentioned under HPI and assessment and plan sections.        ASSESSMENT AND PLAN:    Venous hypertension with venous stasis, CEAP 4    Mr. Ingram has evidence of venous hypertension with minimal swelling but more noticeable skin changes such as hemosiderin deposition and lipodermatosclerosis.  His main risk factors are his age and progression working as an artist  spending the majority of his day sitting with legs down or standing for decades.    We spent some time discussing venous insufficiency and hypertension, the pathophysiology, natural history, possible complications and treatment options.   Treatment is mostly conservative unless there is indication for intervention (see below).  Conservative treatment includes compression therapy, elevation, calf muscle pump exercises and risk factor control.    We would recommend starting knee-high graduated 20-30 mmHg compression stocking.  We discussed to apply this daily in the morning and remove it at night before going to bed.     We also spent some time discussing some lifestyle changes such as leg elevation, avoiding prolonged sitting or standing without breaks, exercise and specifically calf muscle pump exercises.       Recommendations:  Start wearing 20-30 mmHg knee-high graduated compression stocking.  Encouraged leg elevation preferably above the level of the heart for at least 30 minutes, 2-3 times a day.  Avoid prolonged sitting or standing without breaks.  Encouraged exercises and specifically calf muscle pump exercises.  Follow-up as needed if starts having symptoms or progressive skin changes.    It was a pleasure meeting Mr. Perkins in our clinic today.      Thank you for the opportunity to contribute to the care of your patient.  Please do not hesitate to reach out should any questions or concerns arise.    Rafaela Yadav MD  Vascular Medicine  July 5, 2024      Again, thank you for allowing me to participate in the care of your patient.      Sincerely,    Rafaela Yadav MD

## 2024-07-05 NOTE — PATIENT INSTRUCTIONS
Al you have had your consult today with Dr Yadav regarding your venous insufficiency.    Plan:    You have been given prescription for compression stockings, please fill this at a medical supply company.  Please wear daily as tolerated while you are up and about.     No further follow up is needed with Dr Yadav, we will see you back as needed.     Thanks,     JADON Otoole RN, BSN  Interventional Radiology Care Coordinator   Phone:  [phone]

## 2024-07-05 NOTE — PROGRESS NOTES
VASCULAR MEDICINE CONSULT NOTE          LOCATION:  [facility]        Date of Service: 7/5/2024      Primary Care Provider: Fern Sheppard  Referring provider;  Fern Sheppard      Reason for the visit/chief complaint:   Purplish discoloration of the feet      HPI:  Chavez Perkins is a pleasant 78 year old male who presents to our Vascular Medicine clinic for the above mentioned reason.    Mr. Perkins has past medical history significant for hypertension, CKD stage III and prediabetes.    Patient himself reports that he does not have major complaints about his feet other than some cramping of his toes.  He was referred to us by his PCP due to dusky feet.    Per Mr. Perkins, he denied any pain in his feet.  He had noted discoloration although it does not seem that it has bothered him in the past.  Denies any swelling subjectively.  Denies sense of heaviness.  Denies ever trying to elevate legs or assess if the color would lighten up.  Also denies ever wearing compression stockings for  Denies previous history of DVT, varicose veins or superficial vein thrombosis to both lower extremities.  His BMI is 18 and denies ever being overweight.  Never smoked.  Denies family history of swelling, lymphedema or venous insufficiency.    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  Frequently, he works many hours including nights when finishing up projects.  He has done this for the majority of his life.  Previously, was working at his office and most recently transition to work at home.  He never necessarily puts his legs up.  He does not have any regular exercise routine.    He completed an exercise JUDY study with completely normal JUDY pre and post exercise on 5/20/2024.  Doppler waveforms were as well normal.      REVIEW OF SYSTEMS:    A 12 point ROS was reviewed and is negative except what is mentioned in HPI.       Past  medical history, surgical history, medications, family history, social history and allergies were reviewed. Pertinent points mentioned under HPI.      OBJECTIVE:    Vital signs:  BP (!) 144/69 (BP Location: Right arm, Patient Position: Chair, Cuff Size: Adult Small)   Pulse 66   SpO2 99%   Wt Readings from Last 1 Encounters:   05/01/24 136 lb     There is no height or weight on file to calculate BMI.    Physical exam:  General appearance: Pleasant male in no apparent distress.    HEENT: NC/AT.    Neck: Carotids +2/2 bilaterally without bruits.  No jugular venous distension.   Heart: RRR. Normal S1, S2. No murmur, rub, click, or gallop.   Chest: Clear to auscultation bilaterally.  Abdomen: Soft, nontender, nondistended. No pulsatile mass.  No bruits.   Extremities and skin: Bilateral lower extremity with no significant swelling apart from very mild pedal pitting edema on the left.  Few reticular veins noted bilaterally especially posteriorly.  No varicose veins  Hemosiderin deposition noted bilaterally with corona phlebectatica.  No lipodermatosclerosis or skin wounds.  Palpable DP and PT 2/2 bilaterally  Neurological: Alert, awake and oriented           DIAGNOSTIC STUDIES:   Labs and diagnostics reviewed including outside records. Pertinent points are mentioned under HPI and assessment and plan sections.        ASSESSMENT AND PLAN:    Venous hypertension with venous stasis, CEAP 4    Mr. Ingram has evidence of venous hypertension with minimal swelling but more noticeable skin changes such as hemosiderin deposition and lipodermatosclerosis.  His main risk factors are his age and progression working as an artist spending the majority of his day sitting with legs down or standing for decades.    We spent some time discussing venous insufficiency and hypertension, the pathophysiology, natural history, possible complications and treatment options.   Treatment is mostly conservative unless there is indication for  intervention (see below).  Conservative treatment includes compression therapy, elevation, calf muscle pump exercises and risk factor control.    We would recommend starting knee-high graduated 20-30 mmHg compression stocking.  We discussed to apply this daily in the morning and remove it at night before going to bed.     We also spent some time discussing some lifestyle changes such as leg elevation, avoiding prolonged sitting or standing without breaks, exercise and specifically calf muscle pump exercises.       Recommendations:  Start wearing 20-30 mmHg knee-high graduated compression stocking.  Encouraged leg elevation preferably above the level of the heart for at least 30 minutes, 2-3 times a day.  Avoid prolonged sitting or standing without breaks.  Encouraged exercises and specifically calf muscle pump exercises.  Follow-up as needed if starts having symptoms or progressive skin changes.    It was a pleasure meeting Mr. Perkins in our clinic today.      Thank you for the opportunity to contribute to the care of your patient.  Please do not hesitate to reach out should any questions or concerns arise.    Rafaela Yadav MD  Vascular Medicine  July 5, 2024

## 2024-07-05 NOTE — NURSING NOTE
Chief Complaint   Patient presents with    New Patient     New Vascular Patient. The patient had an US JUDY with [facility] with exercise on 5/20. He is not entirely sure why he is here and is looking for information on his condition.     Vitals were taken and medications reconciled.    Roger Mckinney, EMT  9:11 AM

## 2024-07-24 ENCOUNTER — ALLIED HEALTH/NURSE VISIT (OUTPATIENT)
Dept: FAMILY MEDICINE | Facility: CLINIC | Age: 79
End: 2024-07-24
Payer: COMMERCIAL

## 2024-07-24 ENCOUNTER — TELEPHONE (OUTPATIENT)
Dept: FAMILY MEDICINE | Facility: CLINIC | Age: 79
End: 2024-07-24

## 2024-07-24 VITALS — SYSTOLIC BLOOD PRESSURE: 144 MMHG | HEART RATE: 66 BPM | DIASTOLIC BLOOD PRESSURE: 68 MMHG

## 2024-07-24 DIAGNOSIS — Z01.30 BP CHECK: Primary | ICD-10-CM

## 2024-07-24 PROCEDURE — 99207 PR NO CHARGE NURSE ONLY: CPT

## 2024-07-24 NOTE — PROGRESS NOTES
Chavez Perkins is a 78 year old year old patient who comes in today for a Blood Pressure check because of medication change.  Vital Signs as repeated by RN     Right arm manual= 132/62  Right automatic =148/67 pulse=65    Left arm manual=138/52   Left arm Automatic =144/68 pulse=66    Patient is taking medication as prescribed  Patient is tolerating medications well.  Patient is not monitoring Blood Pressure at home.    Current complaints: light-headedness and some fatigue.  Disposition:  huddled with provider   Talked with Dr. Betsy Hunt. OK to keep BP as written.  PT is not able to handle a home BP machine. We discussed having one at home.  PT lives alone. Hard to place an automatic cuff.    Pt is willing to come back into clinic for any BP readings.  PT will continue the lisinopril 10 mg BID.    Pt was wondering when he should be seen again.  Will update Dr. Sheppard.  OK to call pt on phone. NO VM set up.    Yajaira Cruz RN-[facility]

## 2024-07-25 NOTE — TELEPHONE ENCOUNTER
Allied nursing visit on 7/24/24-  Chavez Perkins is a 78 year old year old patient who comes in today for a Blood Pressure check because of medication change.  Vital Signs as repeated by RN     Right arm manual= 132/62  Right automatic =148/67 pulse=65    Left arm manual=138/52   Left arm Automatic =144/68 pulse=66    Patient is taking medication as prescribed  Patient is tolerating medications well.  Patient is not monitoring Blood Pressure at home.    Current complaints: light-headedness and some fatigue.  Disposition:  huddled with provider   Talked with Dr. Betsy Hunt. OK to keep BP as written.  PT is not able to handle a home BP machine. We discussed having one at home.  PT lives alone. Hard to place an automatic cuff.    Pt is willing to come back into clinic for any BP readings.  PT will continue the lisinopril 10 mg BID.    Pt was wondering when he should be seen again.  Will update Dr. Sheppard.  OK to call pt on phone. NO VM set up.    Yajaira Cruz, RN-[facility]   
Agree with the plan. He typically follows up with me yearly and can do so this year, however if he would like to see me earlier we could set up a visit in 3-6 months. Fern Sheppard M.D.      
Writer called and spoke with patient and relayed message from Dr. Sheppard.   Patient was agreeable to schedule a follow-up appointment in about 3 months.   Scheduled for October.   JEN Segura RN  [facility]    
70.8

## 2024-08-27 ENCOUNTER — OFFICE VISIT (OUTPATIENT)
Dept: SLEEP MEDICINE | Facility: CLINIC | Age: 79
End: 2024-08-27
Attending: FAMILY MEDICINE
Payer: COMMERCIAL

## 2024-08-27 VITALS
WEIGHT: 132.4 LBS | BODY MASS INDEX: 17.55 KG/M2 | SYSTOLIC BLOOD PRESSURE: 138 MMHG | HEART RATE: 74 BPM | DIASTOLIC BLOOD PRESSURE: 64 MMHG | HEIGHT: 73 IN | OXYGEN SATURATION: 97 %

## 2024-08-27 DIAGNOSIS — G47.33 OSA (OBSTRUCTIVE SLEEP APNEA): Primary | ICD-10-CM

## 2024-08-27 DIAGNOSIS — R06.83 SNORING: ICD-10-CM

## 2024-08-27 PROCEDURE — 99204 OFFICE O/P NEW MOD 45 MIN: CPT | Performed by: INTERNAL MEDICINE

## 2024-08-27 NOTE — Clinical Note
Sleep schedule quite irregular; somewhat bradykinetic and wandering in history.  He denies depressive symptoms.  Concerned about cognitive decline-has this been apparent in recent visits?

## 2024-08-27 NOTE — PROGRESS NOTES
Name: Chavez Perkins MRN# [id number]   Age: 78 year old YOB: 1945     Date of Consultation: August 27, 2024  Consultation is requested by: Fern Sheppard MD  [id number] [place] [address] Fern Sheppard  Primary care provider: Fern Sheppard       Reason for Sleep Consult:     Chavez Perkins is sent by Fern Sheppard for a sleep consultation regarding snoring and possible sleep apnea           Assessment and Plan:     Summary Sleep Diagnoses:  Clinical signs and symptoms of obstructive sleep apnea  Insomnia complaint  Delayed sleep wake phase  Irregular sleep pattern, possibly insufficient sleep     Comorbid Diagnoses:  Somewhat limited social resources living alone  Hypertension   Chronic kidney disease    Summary Recommendations(things to be done):  PSG with sleep diary 2 weeks with plan for cbti and sleep schedule counseling if sleep apnea is not significant  Consider evaluation evaluation for cognitive decline as part of irregular sleep sleep pattern         HISTORY PRESENT ILLNESS:     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         SCALES:     He describes variable work pattern working with computer even at night to develop art projects yet somewhat inconsistently acknowledges challenges with using a computer and phones.  Variable sleep time 12-12:30 and get up at 6-9 with long term delayed sleep phase since adolescence. He has soft snoring without apneas but live alone and has no regular features of RLS or pain. His bedroom is quiet.  He awakens 3-4 times per night briefly to go to bathroom. His room is dark and quiet. He denies depression or anxiety but endorses only occasional worry.       Allergies:    Allergies   Allergen Reactions    No Known Allergies             Problem List:     Patient Active Problem List   Diagnosis    HYPERLIPIDEMIA LDL GOAL <130    Closed kidney laceration    Benign  prostatic hyperplasia with nocturia    CKD (chronic kidney disease) stage 3, GFR 30-59 ml/min (H)    Benign hypertension with CKD (chronic kidney disease) stage III (H)    Paresthesia of both feet            MEDICATIONS:     Current Outpatient Medications   Medication Sig Dispense Refill    COMPRESSION STOCKINGS Please measure and distribute 2 pair of 20mmHg - 30mmHg knee high open or closed toe compression stockings with extra refills as indicated or what insurance will allow . 2 each 4    lisinopril (ZESTRIL) 10 MG tablet Take 2 tablets (20 mg) by mouth daily 90 tablet 2    mirabegron (MYRBETRIQ) 25 MG 24 hr tablet Take 1 tablet (25 mg) by mouth daily 90 tablet 1    simvastatin (ZOCOR) 40 MG tablet Take 1 tablet (40 mg) by mouth at bedtime For cholesterol 90 tablet 3       Problem List:  Patient Active Problem List    Diagnosis Date Noted    Paresthesia of both feet 05/01/2024     Priority: Medium    CKD (chronic kidney disease) stage 3, GFR 30-59 ml/min (H) 08/01/2017     Priority: Medium    Benign hypertension with CKD (chronic kidney disease) stage III (H) 08/01/2017     Priority: Medium    Benign prostatic hyperplasia with nocturia 06/09/2015     Priority: Medium    Closed kidney laceration 10/27/2014     Priority: Medium    HYPERLIPIDEMIA LDL GOAL <130 05/09/2010     Priority: Medium        Past Medical/Surgical History:  Past Medical History:   Diagnosis Date    Advanced directives, counseling/discussion 5/10/2012    Discussed advance care planning with patient; information given to patient to review. 5/10/2012       Hyperlipidemia LDL goal <130 5/9/2010    Hypertension goal BP (blood pressure) < 140/90 4/20/2011    Other abnormal glucose 2006     Prediabetes per diabetes teachers    Syncope and collapse 2005     [employer] [place],while biking     Past Surgical History:   Procedure Laterality Date    HC REMOVAL OF TONSILS,<11 Y/O  1949    STRESS ECHO (METRO)  1/06    passed    [facility]  [facility]  2/5/2008    Normal       Social History:  Social History     Socioeconomic History    Marital status: Single     Spouse name: Not on file    Number of children: 0    Years of education: 16    Highest education level: Not on file   Occupational History    Occupation: Drafting     Employer: SELF     Comment: Artist - [employer]   Tobacco Use    Smoking status: Never    Smokeless tobacco: Never   Vaping Use    Vaping status: Never Used   Substance and Sexual Activity    Alcohol use: No    Drug use: No    Sexual activity: Yes     Partners: Female   Other Topics Concern     Service No    Blood Transfusions No    Caffeine Concern Not Asked     Comment: No caffiene use    Occupational Exposure Not Asked    Hobby Hazards Not Asked    Sleep Concern Not Asked    Stress Concern Not Asked    Weight Concern Not Asked    Special Diet Not Asked    Back Care Not Asked    Exercise No     Comment: Walks and Bikes - 2 times a week    Bike Helmet Not Asked    Seat Belt Yes    Self-Exams Yes    Parent/sibling w/ CABG, MI or angioplasty before 65F 55M? Yes   Social History Narrative    03/05/10    Balanced Diet - Yes    Osteoporosis Preventative measures-  Dairy servings per day: two serving per day of milk in cereal     Regular Exercise -  Yes Describe ride bike in summer and walking     Dental Exam up - YES - Date: 01/10    Eye Exam - NO year ago     Self Testicular Exam -  No    Do you have any concerns about STD's -  No    Abuse: Current or Past (Physical, Sexual or Emotional)- No    Do you feel safe in your environment - Yes    Guns stored in the home - Not Asked    Sunscreen used - No    Seatbelts used - Yes    Lipids - YES - Date: 02/13/09    Glucose -  YES - Date: 02/13/09    Colon Cancer Screening - Colonoscopy 02/05/08(date completed)    Hemoccults - NO    PSA - YES - Date: 02/13/09    Digital Rectal Exam - NO    Immunizations reviewed and up to date - Yes 01/14/08.Yuniel Echavarria MA                          Social Determinants of Health     Financial Resource Strain: Low Risk  (5/1/2024)    Financial Resource Strain     Within the past 12 months, have you or your family members you live with been unable to get utilities (heat, electricity) when it was really needed?: No   Food Insecurity: Low Risk  (5/1/2024)    Food Insecurity     Within the past 12 months, did you worry that your food would run out before you got money to buy more?: No     Within the past 12 months, did the food you bought just not last and you didn t have money to get more?: No   Transportation Needs: Low Risk  (5/1/2024)    Transportation Needs     Within the past 12 months, has lack of transportation kept you from medical appointments, getting your medicines, non-medical meetings or appointments, work, or from getting things that you need?: No   Physical Activity: Unknown (5/1/2024)    Exercise Vital Sign     Days of Exercise per Week: 0 days     Minutes of Exercise per Session: Not on file   Stress: No Stress Concern Present (5/1/2024)    [employer] - Occupational Stress Questionnaire     Feeling of Stress : Not at all   Social Connections: Unknown (5/1/2024)    Social Connection and Isolation Panel [NHANES]     Frequency of Communication with Friends and Family: Not on file     Frequency of Social Gatherings with Friends and Family: Twice a week     Attends Islam Services: Not on file     Active Member of Clubs or Organizations: Not on file     Attends Club or Organization Meetings: Not on file     Marital Status: Not on file   Interpersonal Safety: Low Risk  (5/1/2024)    Interpersonal Safety     Do you feel physically and emotionally safe where you currently live?: Yes     Within the past 12 months, have you been hit, slapped, kicked or otherwise physically hurt by someone?: No     Within the past 12 months, have you been humiliated or emotionally abused in other ways by your partner or ex-partner?: No    Housing Stability: Low Risk  (5/1/2024)    Housing Stability     Do you have housing? : Yes     Are you worried about losing your housing?: No       Family History:  Family History   Problem Relation Age of Onset    Hypertension Paternal Grandmother     C.A.D. Brother     C.A.D. Brother     Diabetes Brother     Diabetes Brother     Cerebrovascular Disease Paternal Grandfather     Allergies Other         neice    Blood Disease Brother         low hgb                Physical Examination:     Patient has very soft voice   Mandibular profile normal  Mallampati Class: II.  Tonsillar Stage: 1  hidden by pillars.  GENERAL: alert and no distress  EYES: Eyes grossly normal to inspection.  No discharge or erythema, or obvious scleral/conjunctival abnormalities.  RESP: No audible wheeze, cough, or visible cyanosis.    SKIN: Visible skin clear. No significant rash, abnormal pigmentation or lesions.  NEURO: Cranial nerves grossly intact.  Soft voice, some inconsistent responses, bradykinesia               Data: All pertinent previous laboratory data reviewed     Recent Labs   Lab Test 05/01/24  1112 02/13/23  1501    142   POTASSIUM 4.8 4.6   CHLORIDE 106 105   CO2 26 25   ANIONGAP 10 12   * 109*   BUN 26.2* 23.5*   CR 1.36* 1.24*   JUVENCIO 9.4 9.7       Recent Labs   Lab Test 05/01/24  1112   HGB 13.8       Recent Labs   Lab Test 05/01/24  1112   PROTTOTAL 6.6   ALBUMIN 4.2   BILITOTAL 0.9   ALKPHOS 81   AST 24   ALT 22       TSH   Date Value   05/01/2024 2.58 uIU/mL   04/26/2013 2.35 mU/L   03/05/2010 2.09 mU/L           VIVIANE GRIFFIN MD 8/27/2024   Total time with examination, interview and chart preparation 60 min

## 2024-08-28 ENCOUNTER — TELEPHONE (OUTPATIENT)
Dept: SLEEP MEDICINE | Facility: CLINIC | Age: 79
End: 2024-08-28
Payer: COMMERCIAL

## 2024-08-28 NOTE — TELEPHONE ENCOUNTER
Reason for Call:  Appointment Request    Patient requesting this type of appt: Procedure: PSG -actigraphy     Requested provider:  n/a    Reason patient unable to be scheduled: Needs to be scheduled by clinic    When does patient want to be seen/preferred time:  routine    Comments: Please call pt to schedule.    8/28/2024 at 11:34 AM by Nathan Schwarz

## 2024-08-28 NOTE — TELEPHONE ENCOUNTER
Message sent to sleep specialists    Joanne OSMAN RN  [facility]

## 2024-09-24 ENCOUNTER — TRANSFERRED RECORDS (OUTPATIENT)
Dept: HEALTH INFORMATION MANAGEMENT | Facility: CLINIC | Age: 79
End: 2024-09-24

## 2024-10-09 NOTE — PROGRESS NOTES
Assessment & Plan      Memory change  Has noticed some mild memory changes. Has also noticed difficulty with handwriting, shuffling gait, some sleep disruption or difficulty getting to sleep, paresthesias BLE. no tremor.   Referred to neurology for further evaluation.   TSH, B12 normal    Essential hypertension with goal blood pressure less than 140/90  Controlled  CMP today and urine albumin  He was previously on HCTZ, stopped 5/2015. Continues on lisinopril 10 mg twice daily.      CKD (chronic kidney disease) stage 3, GFR 30-59 ml/min  Mild reduction in function 5/24. Baseline GFR 50s, Cr 1.25-1.3 approx.    continues ACEI - recently increased  Avoids NSAIDS     Nocturia  Urinary urgency    no improvement with myrbetriq and feels it is sedating. Stop myrbetriq.     Saw urology 7/2023 - note reviewed    Reducing carbonated caffeinated beverage at night has not helped.   Will try myrbetriq starting at 25mg daily. He will follow up in a month for BP recheck given possible increased BP with the medication.      He continues to have nocturia once to three times nightly, that has been stable since going off HCTZ in 2015.  Did not notice a difference with flomax.      Clinical signs and symptoms of JENNI  Insomnia    Had sleep eval with Dr. Silva. Sleep study recommended as well as further evaluation for possible cognitive decline as a contributor to irregular sleep pattern.       Hyperlipidemia LDL goal <130  Stable. Continues simvastatin 40 mg.      Numbness bilateral feet   Reduced, but not absent sensation with monofilament on exam   Advised checking his feet every day.    b12, TSH normal. A1c 5.8 A1c,CMP stable           Patient Instructions   You can use the ipratropium nasal spray 2-3 times per day to see if it helps with the nose dripping.   Schedule a visit with neurology for your memory, sleep disruption, tingling.   Continue taking the lisinopril 10mg (one pill) twice daily.      Subjective   Al is a 79 year  old, presenting for the following health issues:  Hypertension (F/U)    History of Present Illness       Reason for visit:  Reguar appointment at doctors request    He eats 2-3 servings of fruits and vegetables daily.He consumes 1 sweetened beverage(s) daily.He exercises with enough effort to increase his heart rate 9 or less minutes per day.  He exercises with enough effort to increase his heart rate 3 or less days per week. He is missing 1 dose(s) of medications per week.        He reports his nose drips when he eats.     Notes that he has trouble writing. Ws having trouble with his checking account. Then he changed it over to his computer. That has been working well. Feels like his writing has become more sloppy. Writing has become tinier.   Denies any hand tremor. Feels like he shuffles more. Feels like his words are quicker.   Has tingling in his toes and up to his lower leg. Otherwise denies any weakness, numbness, tingling.       He notes that he is taking myrbetriq and he is not sure if it is helping. He feels like it makes him drowsy.     Has been seeing a dentist. He lost a bridge and has a plan for follow up on Monday.          Objective    /60 (BP Location: Right arm, Patient Position: Sitting, Cuff Size: Adult Regular)   Pulse 75   Temp 97.3  F (36.3  C) (Temporal)   Resp 18   Wt 60.5 kg (133 lb 6.4 oz)   SpO2 100%   BMI 17.60 kg/m    Body mass index is 17.6 kg/m .  Wt Readings from Last 5 Encounters:   10/10/24 60.5 kg (133 lb 6.4 oz)   08/27/24 60.1 kg (132 lb 6.4 oz)   05/01/24 61.7 kg (136 lb)   07/24/23 63.5 kg (140 lb)   02/13/23 62.2 kg (137 lb 3.2 oz)      Physical Exam   GENERAL: alert and no distress, somewhat slow, quiet talker   PSYCH: mentation appears normal, affect flat    Office Visit on 05/01/2024   Component Date Value Ref Range Status    Sodium 05/01/2024 142  135 - 145 mmol/L Final    Reference intervals for this test were updated on 09/26/2023 to more accurately reflect  our healthy population. There may be differences in the flagging of prior results with similar values performed with this method. Interpretation of those prior results can be made in the context of the updated reference intervals.     Potassium 05/01/2024 4.8  3.4 - 5.3 mmol/L Final    Carbon Dioxide (CO2) 05/01/2024 26  22 - 29 mmol/L Final    Anion Gap 05/01/2024 10  7 - 15 mmol/L Final    Urea Nitrogen 05/01/2024 26.2 (H)  8.0 - 23.0 mg/dL Final    Creatinine 05/01/2024 1.36 (H)  0.67 - 1.17 mg/dL Final    GFR Estimate 05/01/2024 53 (L)  >60 mL/min/1.73m2 Final    Calcium 05/01/2024 9.4  8.8 - 10.2 mg/dL Final    Chloride 05/01/2024 106  98 - 107 mmol/L Final    Glucose 05/01/2024 102 (H)  70 - 99 mg/dL Final    Alkaline Phosphatase 05/01/2024 81  40 - 150 U/L Final    Reference intervals for this test were updated on 11/14/2023 to more accurately reflect our healthy population. There may be differences in the flagging of prior results with similar values performed with this method. Interpretation of those prior results can be made in the context of the updated reference intervals.    AST 05/01/2024 24  0 - 45 U/L Final    Reference intervals for this test were updated on 6/12/2023 to more accurately reflect our healthy population. There may be differences in the flagging of prior results with similar values performed with this method. Interpretation of those prior results can be made in the context of the updated reference intervals.    ALT 05/01/2024 22  0 - 70 U/L Final    Reference intervals for this test were updated on 6/12/2023 to more accurately reflect our healthy population. There may be differences in the flagging of prior results with similar values performed with this method. Interpretation of those prior results can be made in the context of the updated reference intervals.      Protein Total 05/01/2024 6.6  6.4 - 8.3 g/dL Final    Albumin 05/01/2024 4.2  3.5 - 5.2 g/dL Final    Bilirubin Total  05/01/2024 0.9  <=1.2 mg/dL Final    Cholesterol 05/01/2024 175  <200 mg/dL Final    Triglycerides 05/01/2024 54  <150 mg/dL Final    Direct Measure HDL 05/01/2024 76  >=40 mg/dL Final    LDL Cholesterol Calculated 05/01/2024 88  <=100 mg/dL Final    Non HDL Cholesterol 05/01/2024 99  <130 mg/dL Final    Patient Fasting > 8hrs? 05/01/2024 Yes   Final    Creatinine Urine mg/dL 05/01/2024 209.0  mg/dL Final    The reference ranges have not been established in urine creatinine. The results should be integrated into the clinical context for interpretation.    Albumin Urine mg/L 05/01/2024 21.3  mg/L Final    The reference ranges have not been established in urine albumin. The results should be integrated into the clinical context for interpretation.    Albumin Urine mg/g Cr 05/01/2024 10.19  0.00 - 17.00 mg/g Cr Final    Microalbuminuria is defined as an albumin:creatinine ratio of 17 to 299 for males and 25 to 299 for females. A ratio of albumin:creatinine of 300 or higher is indicative of overt proteinuria.  Due to biologic variability, positive results should be confirmed by a second, first-morning random or 24-hour timed urine specimen. If there is discrepancy, a third specimen is recommended. When 2 out of 3 results are in the microalbuminuria range, this is evidence for incipient nephropathy and warrants increased efforts at glucose control, blood pressure control, and institution of therapy with an angiotensin-converting-enzyme (ACE) inhibitor (if the patient can tolerate it).      Hemoglobin 05/01/2024 13.8  13.3 - 17.7 g/dL Final    Vitamin B12 05/01/2024 634  232 - 1,245 pg/mL Final    Hemoglobin A1C 05/01/2024 5.8 (H)  0.0 - 5.6 % Final    Normal <5.7%   Prediabetes 5.7-6.4%    Diabetes 6.5% or higher     Note: Adopted from ADA consensus guidelines.    TSH 05/01/2024 2.58  0.30 - 4.20 uIU/mL Final           Signed Electronically by: Fern Sheppard MD

## 2024-10-10 ENCOUNTER — OFFICE VISIT (OUTPATIENT)
Dept: FAMILY MEDICINE | Facility: CLINIC | Age: 79
End: 2024-10-10
Payer: COMMERCIAL

## 2024-10-10 VITALS
WEIGHT: 133.4 LBS | HEART RATE: 75 BPM | DIASTOLIC BLOOD PRESSURE: 60 MMHG | RESPIRATION RATE: 18 BRPM | TEMPERATURE: 97.3 F | OXYGEN SATURATION: 100 % | BODY MASS INDEX: 17.6 KG/M2 | SYSTOLIC BLOOD PRESSURE: 124 MMHG

## 2024-10-10 DIAGNOSIS — R41.3 MEMORY CHANGE: Primary | ICD-10-CM

## 2024-10-10 DIAGNOSIS — Z23 NEED FOR SHINGLES VACCINE: ICD-10-CM

## 2024-10-10 DIAGNOSIS — I10 ESSENTIAL HYPERTENSION WITH GOAL BLOOD PRESSURE LESS THAN 140/90: ICD-10-CM

## 2024-10-10 DIAGNOSIS — J31.0 GUSTATORY RHINITIS: ICD-10-CM

## 2024-10-10 DIAGNOSIS — R26.89 SHUFFLING GAIT: ICD-10-CM

## 2024-10-10 DIAGNOSIS — G47.00 INSOMNIA, UNSPECIFIED TYPE: ICD-10-CM

## 2024-10-10 DIAGNOSIS — Z29.11 NEED FOR VACCINATION AGAINST RESPIRATORY SYNCYTIAL VIRUS: ICD-10-CM

## 2024-10-10 DIAGNOSIS — R20.2 PARESTHESIAS: ICD-10-CM

## 2024-10-10 PROCEDURE — 99214 OFFICE O/P EST MOD 30 MIN: CPT | Mod: 25 | Performed by: FAMILY MEDICINE

## 2024-10-10 PROCEDURE — 90480 ADMN SARSCOV2 VAC 1/ONLY CMP: CPT | Performed by: FAMILY MEDICINE

## 2024-10-10 PROCEDURE — 91320 SARSCV2 VAC 30MCG TRS-SUC IM: CPT | Performed by: FAMILY MEDICINE

## 2024-10-10 PROCEDURE — 90662 IIV NO PRSV INCREASED AG IM: CPT | Performed by: FAMILY MEDICINE

## 2024-10-10 PROCEDURE — G0008 ADMIN INFLUENZA VIRUS VAC: HCPCS | Performed by: FAMILY MEDICINE

## 2024-10-10 RX ORDER — LISINOPRIL 10 MG/1
10 TABLET ORAL 2 TIMES DAILY
Qty: 180 TABLET | Refills: 3 | Status: SHIPPED | OUTPATIENT
Start: 2024-10-10

## 2024-10-10 RX ORDER — IPRATROPIUM BROMIDE 42 UG/1
2 SPRAY, METERED NASAL 3 TIMES DAILY
Qty: 15 ML | Refills: 1 | Status: SHIPPED | OUTPATIENT
Start: 2024-10-10

## 2024-10-10 ASSESSMENT — PAIN SCALES - GENERAL: PAINLEVEL: NO PAIN (0)

## 2024-10-10 NOTE — PATIENT INSTRUCTIONS
You can use the ipratropium nasal spray 2-3 times per day to see if it helps with the nose dripping.   Schedule a visit with neurology for your memory, sleep disruption, tingling.   Continue taking the lisinopril 10mg (one pill) twice daily.

## 2024-11-04 ENCOUNTER — OFFICE VISIT (OUTPATIENT)
Dept: SLEEP MEDICINE | Facility: CLINIC | Age: 79
End: 2024-11-04
Payer: COMMERCIAL

## 2024-11-04 DIAGNOSIS — G47.33 OSA (OBSTRUCTIVE SLEEP APNEA): ICD-10-CM

## 2024-11-04 DIAGNOSIS — R06.83 SNORING: Primary | ICD-10-CM

## 2024-11-04 NOTE — PROGRESS NOTES
Patient presented to clinic for  and demonstration of the actigrapghy watch. Patient was set up and instructed use. Patient verbalized understanding and demonstrated set up back to me. Patient will be returning device by 11/18/24.    Patient was given sleep logs and written instructions for use.    Madison Watts , Home Sleep Studies Specialist  [place]  / [facility]

## 2024-11-18 ENCOUNTER — THERAPY VISIT (OUTPATIENT)
Dept: SLEEP MEDICINE | Facility: CLINIC | Age: 79
End: 2024-11-18
Payer: COMMERCIAL

## 2024-11-18 DIAGNOSIS — G47.33 OSA (OBSTRUCTIVE SLEEP APNEA): ICD-10-CM

## 2024-11-19 ENCOUNTER — TRANSFERRED RECORDS (OUTPATIENT)
Dept: HEALTH INFORMATION MANAGEMENT | Facility: CLINIC | Age: 79
End: 2024-11-19

## 2024-11-19 ENCOUNTER — DOCUMENTATION ONLY (OUTPATIENT)
Dept: SLEEP MEDICINE | Facility: CLINIC | Age: 79
End: 2024-11-19
Payer: COMMERCIAL

## 2024-11-21 NOTE — TELEPHONE ENCOUNTER
RECORDS RECEIVED FROM: Internal    REASON FOR VISIT: R41.3 (ICD-10-CM) - Memory change  R26.89 (ICD-10-CM) - Shuffling gait  G47.00 (ICD-10-CM) - Insomnia, unspecified type  R20.2 (ICD-10-CM) - Paresthesias   PROVIDER: Can Collazo DO   DATE OF APPT: 1/9/25 @ 10:00 am    NOTES (FOR ALL VISITS) STATUS DETAILS   OFFICE NOTE from referring provider Internal 10/10/24, 5/1/24 Fern Sheppard MD @[facility]     MEDICATION LIST Internal    IMAGING  (FOR ALL VISITS)     ULTRASOUND (CAROTID BILAT) *VASCULAR* Internal [facility]  5/20/24 US JUDY w/ PPG with Exercise

## 2024-12-03 LAB — SLPCOMP: NORMAL

## 2024-12-14 ENCOUNTER — MYC MEDICAL ADVICE (OUTPATIENT)
Dept: SLEEP MEDICINE | Facility: CLINIC | Age: 79
End: 2024-12-14
Payer: COMMERCIAL

## 2024-12-14 DIAGNOSIS — G47.33 OSA (OBSTRUCTIVE SLEEP APNEA): Primary | ICD-10-CM

## 2024-12-16 LAB — SLPCOMP: NORMAL

## 2025-01-09 ENCOUNTER — PRE VISIT (OUTPATIENT)
Dept: NEUROLOGY | Facility: CLINIC | Age: 80
End: 2025-01-09

## 2025-01-20 ENCOUNTER — ANCILLARY PROCEDURE (OUTPATIENT)
Dept: CARDIOLOGY | Facility: CLINIC | Age: 80
End: 2025-01-20
Attending: INTERNAL MEDICINE
Payer: COMMERCIAL

## 2025-01-20 DIAGNOSIS — G47.33 OSA (OBSTRUCTIVE SLEEP APNEA): ICD-10-CM

## 2025-01-20 LAB — LVEF ECHO: NORMAL

## 2025-01-20 PROCEDURE — 93306 TTE W/DOPPLER COMPLETE: CPT | Performed by: INTERNAL MEDICINE

## 2025-02-05 ENCOUNTER — TELEPHONE (OUTPATIENT)
Dept: FAMILY MEDICINE | Facility: CLINIC | Age: 80
End: 2025-02-05
Payer: COMMERCIAL

## 2025-02-05 DIAGNOSIS — E78.5 HYPERLIPIDEMIA LDL GOAL <130: ICD-10-CM

## 2025-02-05 DIAGNOSIS — I10 ESSENTIAL HYPERTENSION WITH GOAL BLOOD PRESSURE LESS THAN 140/90: ICD-10-CM

## 2025-02-05 RX ORDER — LISINOPRIL 10 MG/1
10 TABLET ORAL 2 TIMES DAILY
Qty: 200 TABLET | Refills: 2 | Status: SHIPPED | OUTPATIENT
Start: 2025-02-05

## 2025-02-05 RX ORDER — SIMVASTATIN 40 MG
40 TABLET ORAL AT BEDTIME
Qty: 100 TABLET | Refills: 2 | Status: SHIPPED | OUTPATIENT
Start: 2025-02-05

## 2025-02-05 NOTE — TELEPHONE ENCOUNTER
Patient has [facility] coverage and with this insurance plan, the patient is eligible to receive certain prescriptions as a 100-day supply at the 90-day supply cost.      Prescriptions updated to 100-day supply per protocol: lisinopril and simvastatin      Cassie Ervin, PharmD, [facility]  Population Health Pharmacist  [phone]

## 2025-04-02 ENCOUNTER — TRANSFERRED RECORDS (OUTPATIENT)
Dept: HEALTH INFORMATION MANAGEMENT | Facility: CLINIC | Age: 80
End: 2025-04-02
Payer: COMMERCIAL

## 2025-04-25 PROCEDURE — 86780 TREPONEMA PALLIDUM: CPT | Performed by: PSYCHIATRY & NEUROLOGY

## 2025-04-25 PROCEDURE — 86334 IMMUNOFIX E-PHORESIS SERUM: CPT | Mod: 26 | Performed by: PATHOLOGY

## 2025-04-25 PROCEDURE — 84165 PROTEIN E-PHORESIS SERUM: CPT | Mod: 26 | Performed by: PATHOLOGY

## 2025-04-25 PROCEDURE — 84165 PROTEIN E-PHORESIS SERUM: CPT | Mod: TC | Performed by: PATHOLOGY

## 2025-04-25 PROCEDURE — 99000 SPECIMEN HANDLING OFFICE-LAB: CPT | Performed by: PATHOLOGY

## 2025-04-25 PROCEDURE — 82607 VITAMIN B-12: CPT | Performed by: PSYCHIATRY & NEUROLOGY

## 2025-04-25 PROCEDURE — 86334 IMMUNOFIX E-PHORESIS SERUM: CPT | Performed by: PATHOLOGY

## 2025-04-28 ENCOUNTER — TELEPHONE (OUTPATIENT)
Dept: NEUROLOGY | Facility: CLINIC | Age: 80
End: 2025-04-28
Payer: COMMERCIAL

## 2025-04-28 NOTE — TELEPHONE ENCOUNTER
[facility] Call Center    Phone Message    May a detailed message be left on voicemail: yes     Reason for Call: Medication Question or concern regarding medication   Prescription Clarification  Name of Medication: carbidopa-levodopa (SINEMET)  MG tablet   Prescribing Provider: Can Collazo DO   Pharmacy: [facility] - [place], [place] - [address]   What on the order needs clarification?     Pt requesting call back to discuss medication. Pt states the medication on the bottle states not to drive on that medication. Pt is wondering when he can drive on this medication. Pt also has questions on lesions that have been taken out that were cancerous.     Pt's call back # [phone]      Action Taken: Message routed to:  [facility] ([facility]): Neurology    Travel Screening: Not Applicable

## 2025-04-29 NOTE — TELEPHONE ENCOUNTER
I called and left a voice message asking for a call back.  He wants to discuss driving with taking his carbidopa.

## 2025-05-05 ENCOUNTER — THERAPY VISIT (OUTPATIENT)
Dept: OCCUPATIONAL THERAPY | Facility: CLINIC | Age: 80
End: 2025-05-05
Attending: PSYCHIATRY & NEUROLOGY
Payer: COMMERCIAL

## 2025-05-05 DIAGNOSIS — R41.3 MEMORY CHANGE: ICD-10-CM

## 2025-05-05 DIAGNOSIS — R41.3 MEMORY CHANGES: Primary | ICD-10-CM

## 2025-05-05 PROCEDURE — 97535 SELF CARE MNGMENT TRAINING: CPT | Mod: GO

## 2025-05-05 PROCEDURE — 96125 COGNITIVE TEST BY HC PRO: CPT | Mod: GO

## 2025-05-05 PROCEDURE — 97165 OT EVAL LOW COMPLEX 30 MIN: CPT | Mod: GO

## 2025-05-05 NOTE — PROGRESS NOTES
OCCUPATIONAL THERAPY EVALUATION  Type of Visit: Evaluation       Fall Risk Screen:  Have you fallen 2 or more times in the past year?: Yes  Have you fallen and had an injury in the past year?: Yes      Subjective      Condition type:  Chronic (continuous duration <3 months)  Cause of current episode:  Unspecified (neurological)     Nature of treatment:  Rehabilitative  Functional ability:  Minimal functional limitations  Documented POC (choose all that apply):  Measurable short and long term/discharge treatment goals related to physical and functional deficits.;Frequency of treatment visits and treatment activities to address deficit areas.;Patient agrees to program participation including home program  Briefly describe symptoms:  onset of memory changes  How did the symptoms start:  worsening memory over the years  Average pain/intensity last 24 hours:  0/10  Average pain/intensity past week:  0/10  Frequency of Symptoms:  Intermittently (0-25% of the time)  Symptom impact on ADLs:  A little bit  Condition change since eval:  N/A (first visit)  General health reported by patient:  Good     Presenting condition or subjective complaint:    Date of onset: 04/25/25 (order date)    Relevant medical history: Bladder or bowel problems; Dizziness; High blood pressure; Incontinence   Dates & types of surgery: cateract surgery last December    Prior diagnostic imaging/testing results: Other     Prior therapy history for the same diagnosis, illness or injury: No      Pt is an 79 year old make who was referred to outpatient occupational therapy at the request of Dr. Collazo for administration of The Cognitive Performance Test (CPT). The patient has a pertinent medical history of CKDIII, HLD, HTN, BPH, JENNI and syncope (2005).He was seen for initial consultation with neurology on 4/25/25 for issues of parathesias, memory and shuffling gait. Exam showed concerns with Parkinsonism and poly neuropathy. OT was ordered for CPT,  CD/LD was prescribed, and neuropsychological testing was ordered.  Patient lives lives in [place] by himself, never , never had kids. He is independent in IADL's including shopping, meals, appointments/schedule, driving, yardwork. He enjoys writing and works as a .  He feels he has a good social support system (family, friends) but they do not need to directly support him with daily tasks     Prior Level of Function  Transfers: Independent  Ambulation: Independent  ADL: Independent  IADL: Driving, Finances, Housekeeping, Laundry, Meal preparation, Medication management    Living Environment  Social support: Alone   Type of home: House   Stairs to enter the home: No       Ramp: Yes   Stairs inside the home: Yes 2 Is there a railing: Yes     Help at home: None  Equipment owned: Hedgeable     Employment: No    Hobbies/Interests: writing books    drawing    bike riding    Judaism work    Patient goals for therapy: go to the bathroom and get more sleep at night    Pain assessment:      Objective     Cognitive Status Examination  Orientation: Oriented to person, place and time   Level of Consciousness: flat affect/masked face  Follows Commands and Answers Questions: 100% of the time  Personal Safety and Judgement: Intact  Memory: reports his memory has always been poor. He feels like it is worsening  MoCA: 24/30    CPT performed this date, see separate note for details     VISUAL SKILLS  Visual Acuity: Wears glasses      SENSATION: neuropathy in feet    BALANCE: Shuttling gait has been noticed in office visits and neurology visit indicated concerns with parkinsonism. He reports he occassional falls particularly when reaching to the ground. No injuries. He is on Sinemet and does not notice a difference in symptoms    FUNCTIONAL MOBILITY  Assistive Device(s): None  Ambulation: Independent with ambulation but demo's narrow base of support, shorter stride and shuffling gait. Parkinsonism noted  at neurology visit     INSTRUMENTAL ACTIVITIES OF DAILY LIVING (IADL):   Living situation: Lives independently. He has never been  and no kids. He feels like he as good support (ie: friends, nieces and nephews) but they do not need to provide direct support at this time     Home maintenance activities: He manages yardwork and snow removal independently      Appointments: He schedules his own appointments and manages his calendar     Meal preparation and grocery shopping: He is independent in meal planning and prep and performs own grocery shopping. Denies issues with steps of recipes, denies leaving stove/oven on     Finances and paying bills: Independent in managing finances and sends in payment. Denies missed payment      Medication management: Pt sets up own medications and recalls to take medications     Driving and transportation: Patient is independently driving, enjoys traveling     Leisure and routine activities: writing, being on computer      ADL/Mobility/Physical Functioning: Independent in self-cares and ambulates without use of an assistive device. He demonstrates narrow base of support, short stride length    Assessment & Plan   CLINICAL IMPRESSIONS  Medical Diagnosis: Memory change (R41.3)    Treatment Diagnosis: memry change    Impression/Assessment: Pt is a 79 year old male presenting to Occupational Therapy for administration of The Cognitive Performance Test.  The following significant findings have been identified: Impaired cognition.  These identified deficits interfere with their ability to perform household chores, driving , medication management, financial management, meal planning and preparation, and community or volunteer activities as compared to previous level of function.     Clinical Decision Making (Complexity):  Assessment of Occupational Performance: 1-3 Performance Deficits  Occupational Performance Limitations: driving and community mobility, health management and  maintenance, home establishment and management, and meal preparation and cleanup  Clinical Decision Making (Complexity): Low complexity        PLAN OF CARE  Treatment Interventions:  Interventions: Self-Care/Home Management, Therapeutic Activity    Long Term Goals   OT Goal 1  Goal Identifier: CPT  Goal Description: Patient o verbalize understanding of Cognitive Performance Test results and recommendations including appropriate living support fand at least 3 cognitive compensation techniques he can incorporate to aid he in daily functioning with ADL/IADL  Target Date: 05/05/25  Date Met: 05/05/25      Frequency of Treatment: 1 visit  Duration of Treatment: 1 visit     Recommended Referrals to Other Professionals: none  Education Assessment:       Risks and benefits of evaluation/treatment have been explained.   Patient/Family/caregiver agrees with Plan of Care.     Evaluation Time:    OT Eval, Low Complexity Minutes (15129): 15     Signing Clinician: RAYMON Lamar, CNS, RUFINA        [facility]                                                                                   OUTPATIENT OCCUPATIONAL THERAPY      PLAN OF TREATMENT FOR OUTPATIENT REHABILITATION   Patient's Last Name, First Name, Chavez Leroy YOB: 1945   Provider's Name   [facility]   Medical Record No.  [id number]     Onset Date: 04/25/25 (order date) Start of Care Date: 05/05/25     Medical Diagnosis:  Memory change (R41.3)      OT Treatment Diagnosis:  memry change Plan of Treatment  Frequency/Duration:1 visit/1 visit    Certification date from 05/05/25   To 05/05/25        See note for plan of treatment details and functional goals     RAYMON Lamar, ANDRZEJ, CSRS                         I CERTIFY THE NEED FOR THESE SERVICES FURNISHED UNDER        THIS PLAN OF TREATMENT AND WHILE UNDER MY CARE     (Physician attestation of this document indicates review  and certification of the therapy plan).              Referring Provider:  Can Collazo    Initial Assessment  See Epic Evaluation- 05/05/25

## 2025-05-05 NOTE — PROGRESS NOTES
Cognitive Performance Test    SUMMARY OF TEST:    The Cognitive Performance Test (CPT) is a standardized performance-based assessment to measure working memory/executive function processing capacities that underlie functional performance. Subtasks include common basic and instrumental activities of daily living (ADL/IADL) which are rated based on the manner in which patients respond to task demands of varying complexity. The total CPT score describes a level of functioning that indicates how information is processed, implications for functional activities, potential safety risks and a recommended level of supervision or assist based on cognitive function. The highest total score on this test is in the range of 5.6 to 5.8.    DATE OF TESTIN25    Ordering Provider:Can Collazo DO    Order date: 25    Order Diagnosis:Memory change [R41.3]    Occupational Profile (including diagnosis and medical history): Pt is an 79 year old make who was referred to outpatient occupational therapy at the request of Dr. Collazo for administration of The Cognitive Performance Test (CPT). The patient has a pertinent medical history of CKDIII, HLD, HTN, BPH, JENNI and syncope ().He was seen for initial consultation with neurology on 25 for issues of parathesias, memory and shuffling gait. Exam showed concerns with Parkinsonism and poly neuropathy. OT was ordered for CPT, CD/LD was prescribed, and neuropsychological testing was ordered.  Patient lives lives in [facility] by himself, never , never had kids. He is independent in IADL's including shopping, meals, appointments/schedule, driving, yardwork. He enjoys writing and works as a .  He feels he has a good social support system (family, friends) but they do not need to directly support him with daily tasks     Previous cognitive screens completed in OT or by Physician and score: MoCA:     Functional History  Interview:    Informants: Pt presented to clinic independently.      Client s perception of memory/ thinking or functional difficulties:  Pt feels like his memory has always been poor but feels like it has worsened. He states he relies on his calendar or phone more often to help recall     Living situation: Lives independently. He has never been  and no kids. He feels like he as good support (ie: friends, nieces and nephews) but they do not need to provide direct support at this time     Home maintenance activities: He manages yardwork and snow removal independently     Appointments: He schedules his own appointments and manages his calendar     Meal preparation and grocery shopping: He is independent in meal planning and prep and performs own grocery shopping. Denies issues with steps of recipes, denies leaving stove/oven on     Finances and paying bills: Independent in managing finances and sends in payment. Denies missed payment      Medication management: Pt sets up own medications and recalls to take medications     Driving and transportation: Patient is independently driving, enjoys traveling     Leisure and routine activities: writing, being on computer      ADL/Mobility/Physical Functioning: Independent in self-cares and ambulates without use of an assistive device. He demonstrates narrow base of support, short stride length    Fall Risk Screen:   Has the patient fallen 2 or more times in the last year? Yes - he notes falls when reaching down      Has the patient fallen and had an injury in the past year? No       Timed Up and Go Score: NT    Is the patient a fall risk? Yes, department fall risk interventions implemented      RESULTS OF TESTING:                                                                                         CPT Subtest Results    MEDBOX: 5/6 SHOP/GLOVES: 5/6 PHONE: 6/6   WASH:  5/5 TRAVEL: NT/6 TOAST: 5/5   DRESS: NT/5   TOTAL CPT SCORE:  26/28     Average CPT Score   5.2/5.8    INTERPRETATION OF TEST RESULTS: 5.0    Based on the Cognitive Performance Test, this patient scored at CPT Level 5.0.  See CPT Levels reference below.    Summary of functional cognitive status:   Results reveals mild functional decline. Start of difficulty with complex tasks - Shows start of decline in memory, keeping track of details, decision-making, planning, and organization. Can learn new information. Individuals at this level are able to complete basic daily and routine household tasks but requires assistance for more complex tasks such as complex or new medication schedules, appointments, finances, higher level household tasks, etc. Caregiver may need to: urge patient to use notes, daily checklists, calendars or there reminders; make tasks easier and limit tasks that are too hard; allow extra time; monitor, supervise or help with complex tasks.     Factors affecting performance:  Impaired mobility     Recommendations:      Supervision in living setting:  Can live alone with weekly checks        It is recommended that patient receive oversight/assistance and assistance for complex tasks including new or complex medication set up and schedule, consider hiring help for higher level household tasks such as yardwork/snow removal, assist for higher level planning/decision making, and minimize distractions when driving. Recommend coupling medication schedule with daily routine, using notes, daily checklists, calendars or other reminders; make tasks easier and limit tasks that are too hard; allow extra time; monitor, supervise or help with complex tasks. .                                                    TIME ADMINISTERING TEST: 25    TIME FOR INTERPRETATION AND PREPARATION OF REPORT: 25    TOTAL TIME: 50      CPT Levels Reference:    Patient's Average CPT Score:  5.0                                                                                                                                             "      Individual scores range along a continuum as outlined below.  In addition to cognitive status, other factors may affect safety in a home environment.  Please refer to specific recommendations for this patient.    ___5.6-5.8  Normal functioning (absence of cognitive-functional disability).  Independent in managing personal affairs, monitors and directs own behavior.  Uses complex information to carry out daily activities with safety and accuracy.    Proficient with instrumental activities of daily living (IADL) and learning new activity.  Problems are anticipated, errors are avoided, and consequences of actions are considered.      __x_5.0   Mild cognitive-functional disability; deficits in working memory and executive thought processes. Difficulty using complex information. Problems may be observed with recent memory, judgment, reasoning and planning ahead. May be impulsive or have difficulty anticipating consequences.  Safety:  May require assistance to plan ahead; or to manage complex medication schedules, appointments or finances.  Hazardous activities may need to be monitored or limited.  ADL:  Mild functional decline.  Able to complete basic self-care and routine household tasks.  May have difficulty with complex daily tasks such as reading, writing, meal preparation, shopping or driving.   Learns through hands on teaching. Self-centered behavior or difficulty considering the needs of others may be seen related to trouble seeing the  whole picture\". Can appear disorganized or uninhibited.    ___4.5  Mild to moderate cognitive-functional disability. Significant deficits in working memory and executive thought processes. Judgment, reasoning and planning show obvious impairment.  Distractible with inability to shift attention/actions given competing stimuli.  Difficulty with problem solving and managing details. Complex daily tasks performed with inconsistency, difficulty, or error.     Safety:  Medications " should be monitored, stove use may require supervision, and driving ability may be affected.  Impaired safety awareness with inability to anticipate potential problems.  May not recognize or respond to emergent situations. Requires frequent check-in support.   ADL:  Mild difficulty with simple everyday self-care tasks. Benefits from structured, routine activity.  Will likely need reminders to complete tasks outside of the routine. Requires assistance with planning and IADL tasks like shopping and finances. Learns concrete tasks through repetition, but performance may not generalize. Tends to be impulsive with poor insight. Self centered behavior or inability to consider the needs of others is common.    ___4.0  Moderate cognitive-functional disability; abstract to concrete thought processes. Working memory and executive function impairments are obvious. Difficulty with planning and problem solving.  Behavior is goal-directed, but unable to follow multi-step directions, is easily distracted, and may not recognize mistakes.  Inability to anticipate hazards or understand precautions.  Safety:  Recommend 24-hour supervision for safety. Supervision needed for medication management and for hazardous activities. May not be able to follow a restricted diet. Can get lost in unfamiliar surroundings. Generally, persons functioning at level 4 should not be driving.   ADL:  Some decline in quality or frequency of ADL.  [place] enhanced by use of a routine, simple concrete directions, and caregiver set-up of needed items. Complex tasks such as money or home management typically requires assistance.  Relies heavily on vision to guide behavior; will ignore objects/hazards not in plain sight and can be distracted by irrelevant objects. Often has poor insight.  Able to carry out social conversation and may verbally  cover  for deficits leading caregivers to believe they are capable of functioning independently.        ___3.5  Moderate cognitive-functional disability; increased cues needed for task completion. Aware of concrete task steps but needs prompting or cues to initiate and complete simple tasks. Attention span is limited, simple directions may need to be repeated, and re-focus to a topic or task may be required.  Safety:  24-hour supervision required for safety and for assistance with daily tasks. Assistance required with medications, and access to medication should be limited. Meals, nutrition and dietary restrictions need to be monitored.  All hazardous activities should be restricted or supervised. Should not drive. Prone to wandering and can become lost.  ADL:  Moderate functional decline. Familiar tasks usually requires set-up of supplies and directions to complete steps. May need objects handed to them for task initiation. Function best with a set schedule in familiar surroundings with familiar people. All complex tasks must be done by others. Vocabulary is diminished and speech often unfocused.

## 2025-05-07 ENCOUNTER — OFFICE VISIT (OUTPATIENT)
Dept: FAMILY MEDICINE | Facility: CLINIC | Age: 80
End: 2025-05-07
Payer: COMMERCIAL

## 2025-05-07 VITALS
HEIGHT: 73 IN | WEIGHT: 133.36 LBS | HEART RATE: 64 BPM | OXYGEN SATURATION: 100 % | TEMPERATURE: 97.3 F | BODY MASS INDEX: 17.67 KG/M2 | DIASTOLIC BLOOD PRESSURE: 66 MMHG | SYSTOLIC BLOOD PRESSURE: 132 MMHG

## 2025-05-07 DIAGNOSIS — I10 ESSENTIAL HYPERTENSION WITH GOAL BLOOD PRESSURE LESS THAN 140/90: ICD-10-CM

## 2025-05-07 DIAGNOSIS — R35.0 URINARY FREQUENCY: ICD-10-CM

## 2025-05-07 DIAGNOSIS — E78.5 HYPERLIPIDEMIA LDL GOAL <130: ICD-10-CM

## 2025-05-07 DIAGNOSIS — J31.0 GUSTATORY RHINITIS: ICD-10-CM

## 2025-05-07 DIAGNOSIS — N18.31 CHRONIC KIDNEY DISEASE, STAGE 3A (H): ICD-10-CM

## 2025-05-07 DIAGNOSIS — N18.30 STAGE 3 CHRONIC KIDNEY DISEASE, UNSPECIFIED WHETHER STAGE 3A OR 3B CKD (H): ICD-10-CM

## 2025-05-07 DIAGNOSIS — N40.1 BENIGN PROSTATIC HYPERPLASIA WITH NOCTURIA: ICD-10-CM

## 2025-05-07 DIAGNOSIS — N39.41 URGE INCONTINENCE OF URINE: ICD-10-CM

## 2025-05-07 DIAGNOSIS — Z00.00 ENCOUNTER FOR MEDICARE ANNUAL WELLNESS EXAM: Primary | ICD-10-CM

## 2025-05-07 DIAGNOSIS — R35.1 BENIGN PROSTATIC HYPERPLASIA WITH NOCTURIA: ICD-10-CM

## 2025-05-07 LAB
CHOLEST SERPL-MCNC: 180 MG/DL
CREAT UR-MCNC: 177 MG/DL
FASTING STATUS PATIENT QL REPORTED: YES
HDLC SERPL-MCNC: 79 MG/DL
LDLC SERPL CALC-MCNC: 90 MG/DL
MICROALBUMIN UR-MCNC: 27.7 MG/L
MICROALBUMIN/CREAT UR: 15.65 MG/G CR (ref 0–17)
NONHDLC SERPL-MCNC: 101 MG/DL
TRIGL SERPL-MCNC: 55 MG/DL

## 2025-05-07 PROCEDURE — 80061 LIPID PANEL: CPT | Performed by: FAMILY MEDICINE

## 2025-05-07 PROCEDURE — 82043 UR ALBUMIN QUANTITATIVE: CPT | Performed by: FAMILY MEDICINE

## 2025-05-07 PROCEDURE — 82570 ASSAY OF URINE CREATININE: CPT | Performed by: FAMILY MEDICINE

## 2025-05-07 PROCEDURE — 36415 COLL VENOUS BLD VENIPUNCTURE: CPT | Performed by: FAMILY MEDICINE

## 2025-05-07 RX ORDER — IPRATROPIUM BROMIDE 42 UG/1
2 SPRAY, METERED NASAL 3 TIMES DAILY
Qty: 15 ML | Refills: 1 | Status: SHIPPED | OUTPATIENT
Start: 2025-05-07

## 2025-05-07 RX ORDER — TAMSULOSIN HYDROCHLORIDE 0.4 MG/1
0.4 CAPSULE ORAL DAILY
Qty: 90 CAPSULE | Refills: 0 | Status: SHIPPED | OUTPATIENT
Start: 2025-05-07

## 2025-05-07 RX ORDER — LISINOPRIL 10 MG/1
10 TABLET ORAL 2 TIMES DAILY
Qty: 200 TABLET | Refills: 3 | Status: SHIPPED | OUTPATIENT
Start: 2025-05-07

## 2025-05-07 RX ORDER — SIMVASTATIN 40 MG
40 TABLET ORAL AT BEDTIME
Qty: 100 TABLET | Refills: 3 | Status: SHIPPED | OUTPATIENT
Start: 2025-05-07

## 2025-05-07 SDOH — HEALTH STABILITY: PHYSICAL HEALTH: ON AVERAGE, HOW MANY DAYS PER WEEK DO YOU ENGAGE IN MODERATE TO STRENUOUS EXERCISE (LIKE A BRISK WALK)?: 7 DAYS

## 2025-05-07 ASSESSMENT — SOCIAL DETERMINANTS OF HEALTH (SDOH): HOW OFTEN DO YOU GET TOGETHER WITH FRIENDS OR RELATIVES?: PATIENT DECLINED

## 2025-05-07 ASSESSMENT — PAIN SCALES - GENERAL: PAINLEVEL_OUTOF10: NO PAIN (0)

## 2025-05-07 NOTE — PATIENT INSTRUCTIONS
Patient Education   Start flomax one tablet daily to see if this helps with urinary issues.   Schedule with urology.   I recommend getting the COVID, shingles and RSV (get RSV vaccine in the fall) vaccine(s) at a local pharmacy.    Schedule with hematology  [phone]   See me in about 2-3 months for follow up.   In the meantime, consider looking at some senior living apartments (ideally with assisted living or additional care attached). See if your niece would go with you.   Preventive Care Advice   This is general advice given by our system to help you stay healthy. However, your care team may have specific advice just for you. Please talk to your care team about your preventive care needs.  Nutrition  Eat 5 or more servings of fruits and vegetables each day.  Try wheat bread, brown rice and whole grain pasta (instead of white bread, rice, and pasta).  Get enough calcium and vitamin D. Check the label on foods and aim for 100% of the RDA (recommended daily allowance).  Lifestyle  Exercise at least 150 minutes each week  (30 minutes a day, 5 days a week).  Do muscle strengthening activities 2 days a week. These help control your weight and prevent disease.  No smoking.  Wear sunscreen to prevent skin cancer.  Have a dental exam and cleaning every 6 months.  Yearly exams  See your health care team every year to talk about:  Any changes in your health.  Any medicines your care team has prescribed.  Preventive care, family planning, and ways to prevent chronic diseases.  Shots (vaccines)   HPV shots (up to age 26), if you've never had them before.  Hepatitis B shots (up to age 59), if you've never had them before.  COVID-19 shot: Get this shot when it's due.  Flu shot: Get a flu shot every year.  Tetanus shot: Get a tetanus shot every 10 years.  Pneumococcal, hepatitis A, and RSV shots: Ask your care team if you need these based on your risk.  Shingles shot (for age 50 and up)  General health tests  Diabetes  screening:  Starting at age 35, Get screened for diabetes at least every 3 years.  If you are younger than age 35, ask your care team if you should be screened for diabetes.  Cholesterol test: At age 39, start having a cholesterol test every 5 years, or more often if advised.  Bone density scan (DEXA): At age 50, ask your care team if you should have this scan for osteoporosis (brittle bones).  Hepatitis C: Get tested at least once in your life.  STIs (sexually transmitted infections)  Before age 24: Ask your care team if you should be screened for STIs.  After age 24: Get screened for STIs if you're at risk. You are at risk for STIs (including HIV) if:  You are sexually active with more than one person.  You don't use condoms every time.  You or a partner was diagnosed with a sexually transmitted infection.  If you are at risk for HIV, ask about PrEP medicine to prevent HIV.  Get tested for HIV at least once in your life, whether you are at risk for HIV or not.  Cancer screening tests  Cervical cancer screening: If you have a cervix, begin getting regular cervical cancer screening tests starting at age 21.  Breast cancer scan (mammogram): If you've ever had breasts, begin having regular mammograms starting at age 40. This is a scan to check for breast cancer.  Colon cancer screening: It is important to start screening for colon cancer at age 45.  Have a colonoscopy test every 10 years (or more often if you're at risk) Or, ask your provider about stool tests like a FIT test every year or Cologuard test every 3 years.  To learn more about your testing options, visit:   .  For help making a decision, visit:   https://bit.ly/bk53745.  Prostate cancer screening test: If you have a prostate, ask your care team if a prostate cancer screening test (PSA) at age 55 is right for you.  Lung cancer screening: If you are a current or former smoker ages 50 to 80, ask your care team if ongoing lung cancer screenings are right for  you.  For informational purposes only. Not to replace the advice of your health care provider. Copyright   2023 [facility]. All rights reserved. Clinically reviewed by the [facility] Transitions Program. [employer] [id number] - REV 01/24.  Preventing Falls: Care Instructions  Injuries and health problems such as trouble walking or poor eyesight can increase your risk of falling. So can some medicines. But there are things you can do to help prevent falls. You can exercise to get stronger. You can also arrange your home to make it safer.    Talk to your doctor about the medicines you take. Ask if any of them increase the risk of falls and whether they can be changed or stopped.   Try to exercise regularly. It can help improve your strength and balance. This can help lower your risk of falling.         Practice fall safety and prevention.   Wear low-heeled shoes that fit well and give your feet good support. Talk to your doctor if you have foot problems that make this hard.  Carry a cellphone or wear a medical alert device that you can use to call for help.  Use stepladders instead of chairs to reach high objects. Don't climb if you're at risk for falls. Ask for help, if needed.  Wear the correct eyeglasses, if you need them.        Make your home safer.   Remove rugs, cords, clutter, and furniture from walkways.  Keep your house well lit. Use night-lights in hallways and bathrooms.  Install and use sturdy handrails on stairways.  Wear nonskid footwear, even inside. Don't walk barefoot or in socks without shoes.        Be safe outside.   Use handrails, curb cuts, and ramps whenever possible.  Keep your hands free by using a shoulder bag or backpack.  Try to walk in well-lit areas. Watch out for uneven ground, changes in pavement, and debris.  Be careful in the winter. Walk on the grass or gravel when sidewalks are slippery. Use de-icer on steps and walkways. Add non-slip devices to shoes.    Put grab  "bars and nonskid mats in your shower or tub and near the toilet. Try to use a shower chair or bath bench when bathing.   Get into a tub or shower by putting in your weaker leg first. Get out with your strong side first. Have a phone or medical alert device in the bathroom with you.   Where can you learn more?  Go to https://www.[employer].net/patiented  Enter G117 in the search box to learn more about \"Preventing Falls: Care Instructions.\"  Current as of: July 31, 2024  Content Version: 14.4    7901-1177 [employer].   Care instructions adapted under license by your healthcare professional. If you have questions about a medical condition or this instruction, always ask your healthcare professional. [employer] disclaims any warranty or liability for your use of this information.    Hearing Loss: Care Instructions  Overview     Hearing loss is a sudden or slow decrease in how well you hear. It can range from slight to profound. Permanent hearing loss can occur with aging. It also can happen when you are exposed long-term to loud noise. Examples include listening to loud music, riding motorcycles, or being around other loud machines.  Hearing loss can affect your work and home life. It can make you feel lonely or depressed. You may feel that you have lost your independence. But hearing aids and other devices can help you hear better and feel connected to others.  Follow-up care is a key part of your treatment and safety. Be sure to make and go to all appointments, and call your doctor if you are having problems. It's also a good idea to know your test results and keep a list of the medicines you take.  How can you care for yourself at home?  Avoid loud noises whenever possible. This helps keep your hearing from getting worse.  Always wear hearing protection around loud noises.  Wear a hearing aid as directed.  A professional can help you pick a hearing aid that will work best for you.  You can " "also get hearing aids over the counter for mild to moderate hearing loss.  Have hearing tests as your doctor suggests. They can show whether your hearing has changed. Your hearing aid may need to be adjusted.  Use other devices as needed. These may include:  Telephone amplifiers and hearing aids that can connect to a television, stereo, radio, or microphone.  Devices that use lights or vibrations. These alert you to the doorbell, a ringing telephone, or a baby monitor.  Television closed-captioning. This shows the words at the bottom of the screen. Most new TVs can do this.  TTY (text telephone). This lets you type messages back and forth on the telephone instead of talking or listening. These devices are also called TDD. When messages are typed on the keyboard, they are sent over the phone line to a receiving TTY. The message is shown on a monitor.  Use text messaging, social media, and email if it is hard for you to communicate by telephone.  Try to learn a listening technique called speechreading. It is not lipreading. You pay attention to people's gestures, expressions, posture, and tone of voice. These clues can help you understand what a person is saying. Face the person you are talking to, and have them face you. Make sure the lighting is good. You need to see the other person's face clearly.  Think about counseling if you need help to adjust to your hearing loss.  When should you call for help?  Watch closely for changes in your health, and be sure to contact your doctor if:    You think your hearing is getting worse.     You have new symptoms, such as dizziness or nausea.   Where can you learn more?  Go to https://www.[employer].net/patiented  Enter R798 in the search box to learn more about \"Hearing Loss: Care Instructions.\"  Current as of: October 27, 2024  Content Version: 14.4    6288-4975 [employer].   Care instructions adapted under license by your healthcare professional. If you have " questions about a medical condition or this instruction, always ask your healthcare professional. [employer] disclaims any warranty or liability for your use of this information.

## 2025-05-07 NOTE — PROGRESS NOTES
Preventive Care Visit  [facility]  Fern Sheppard MD, Family Medicine  May 7, 2025      Assessment & Plan       ICD-10-CM    1. Encounter for Medicare annual wellness exam  Z00.00       2. Chronic kidney disease, stage 3a (H)  N18.31       3. Stage 3 chronic kidney disease, unspecified whether stage 3a or 3b CKD (H)  N18.30 Albumin Random Urine Quantitative with Creat Ratio     Albumin Random Urine Quantitative with Creat Ratio      4. Urge incontinence of urine  N39.41 Adult Urology  Referral      5. Urinary frequency  R35.0 Adult Urology  Referral      6. Benign prostatic hyperplasia with nocturia  N40.1 tamsulosin (FLOMAX) 0.4 MG capsule    R35.1       7. Essential hypertension with goal blood pressure less than 140/90  I10 lisinopril (ZESTRIL) 10 MG tablet      8. Hyperlipidemia LDL goal <130  E78.5 simvastatin (ZOCOR) 40 MG tablet     Lipid panel reflex to direct LDL Fasting     Lipid panel reflex to direct LDL Fasting      9. 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 Lobo for skin cancer screening - saw last month       Memory change  Parkinsonism  Peripheral neuropathy  Saw Dr. Collazo 4/25/25- notes reviewed today.   Saw OT yesterday - notes not available yet  Currently lives alone in his home, continues to drive and denies any difficulty doing so. 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 Active in his Taoist.    Discussed considering moving into a senior living apartment with access to additional care if needed in the future. He is open to the possibility, but currently reluctant to consider. He says his niece would likely visit places with him if he wanted her to. Will check back with him at follow up in 2-3 months.     Per neuro visit notes:  "  \"Assessment:  - Parkinsonism  - Memory changes, MCI   - peripheral neuropathy     The patient's exam today supports the presence of parkinsonism, along with some signs of peripheral neuropathy and memory changes.  He is alone today, so reporting may be suspect, but his bradykinsesia, gait impairment and masked facies are signs of parkinsonism that may be altered with sinemet trial.  We went over the medication and how it may be helpful, and he will keep a diary of his responses following taking the drug and with repeat use.  For now, I do not see a need to expand PD related testing towards swallow studies, or gait therapy, but this may be needed pending responses.     For his cognitive issues, I do see evidence of a more amnestic MCI pattern on his screen today.  He reports high degrees of function, but to further define if his reports are subjectively correct or not, I would want an objective test like a CPT.  To go along with his parkinsonism, an MRI brain would provide some insight into atrophy patterns or microvascular disease if present.     His exam shows mild sensorimotor ataxia and sensory loss in the legs consistent with peripheral neuropathy.  I may avoid EMG at this time, with the thought that he could obtain several serum based studies to look for reversible causes of both neuropathy and cognitive impairment. Pending results, we talked about how I would like him to use a cane or walking device given his imbalance with turns or transitions.     Plan:  MRI brain w/wout contrast  Sinemet 25/100 TID  OT for CPT, Neuropsychology testing  CBC, CMP, SPEP, Immunofixation, B12, TSH, RPR     Follow up in Neurology clinic in 2-3 months,  or should new concerns arise.\"     Essential hypertension with goal blood pressure less than 140/90  Controlled.    He was previously on HCTZ, stopped 5/2015. Continues on lisinopril 10 mg twice daily.      CKD (chronic kidney disease) stage 3, GFR 30-59 ml/min  Baseline GFR 50s, " Cr 1.25-1.3 approx.     UA normal 7/23  continues ACEI   Avoids NSAIDS  CMP stable last month  urine albumin today   Hgb normal last month        Nocturia  Urinary urgency   BPH  He reports urinary urgency and urge incontinence is bothering him more. Denies dysuria or hematuria. Will refer back to urology. Will also try flomax daily again    In the past, no improvement with myrbetriq and feels it is sedating. Previously stopped myrbetriq.      Saw urology 7/2023 - note reviewed    Reducing carbonated caffeinated beverage at night has not helped.   Will try myrbetriq starting at 25mg daily. He will follow up in a month for BP recheck given possible increased BP with the medication.      He continues to have nocturia once to three times nightly, that has been stable since going off HCTZ in 2015.  Did not notice a difference with flomax.      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 Silva. Sleep study recommended as well as further evaluation for possible cognitive decline as a contributor to irregular sleep pattern.  In addition, sleep study revealed Cheyne-Delgadillo breathing.   1/20/25 echocardiogram was normal.      Hyperlipidemia    Stable. Continues simvastatin 40 mg daily.           Gustatory rhinitis   ipratropium nasal spray seems to help some. Refilled today                Counseling  Appropriate preventive services were addressed with this patient via screening, questionnaire, or discussion as appropriate          Subjective   Al is a 79 year old, presenting for the following:  Annual Visit        5/7/2025    10:49 AM   Additional Questions   Roomed by Yudi corley   Accompanied by self           HPI  Diego Perkins, 79 years    Urinary issues  - Reports worsening urinary symptoms, needing to urinate every couple of hours  - Experiences leakage, especially when feeling the urge to urinate and needing to get to the bathroom quickly  - No pain during  urination  - No blood in urine    Memory concerns  - Underwent memory testing with an occupational therapist yesterday  - Saw neurologist who suspects Parkinsonism and prescribed carbidopa-levodopa at the end of April.  - No noticeable changes since starting medication  - Uses a calendar to keep track of appointments and reminders    Dermatological concerns  - Saw dermatologist Dr. Lobo in April; spots on head were frozen    Social and lifestyle factors  - Lives alone in a house  - Drives himself and has no trouble with driving  - Engages in writing and drawing, though less frequently due to cataracts affecting vision  -also notes his writing has become smaller  - Has been involved in [employer] club activities but less active since COVID started - does not play on his own at home - used to practice daily. does still attend meetings every two weeks    Vision issues  - Reports cataracts affecting vision, with sensitivity to brightness and a clouded area affecting sight - has plan for follow up with his eye doctor.    Miscellaneous  - Reports decreased activity in drawing due to business decline (patent drawings). Likes to draw portraits.   Advance Care Planning    Discussed advance care planning with patient; however, patient declined at this time.        5/7/2025   General Health   How would you rate your overall physical health? Good   Feel stress (tense, anxious, or unable to sleep) Not at all         5/7/2025   Nutrition   Diet: I don't know         5/7/2025   Exercise   Days per week of moderate/strenous exercise 7 days         5/7/2025   Social Factors   Frequency of gathering with friends or relatives Patient declined   Worry food won't last until get money to buy more No   Food not last or not have enough money for food? No   Do you have housing? (Housing is defined as stable permanent housing and does not include staying outside in a car, in a tent, in an abandoned building, in an overnight shelter, or  couch-surfing.) Yes   Are you worried about losing your housing? No   Lack of transportation? No   Unable to get utilities (heat,electricity)? No         5/7/2025   Fall Risk   Fallen 2 or more times in the past year? Yes   Trouble with walking or balance? Yes   Gait Speed Test (Document in seconds) 3.19   Gait Speed Test Interpretation Less than or equal to 5.00 seconds - PASS          5/7/2025   Activities of Daily Living- Home Safety   Needs help with the following daily activites None of the above   Safety concerns in the home None of the above         5/7/2025   Dental   Dentist two times every year? Yes         5/7/2025   Hearing Screening   Hearing concerns? (!) IT'S HARD TO FOLLOW A CONVERSATION IN A NOISY RESTAURANT OR CROWDED ROOM.         5/7/2025   Driving Risk Screening   Patient/family members have concerns about driving No         5/7/2025   General Alertness/Fatigue Screening   Have you been more tired than usual lately? No         5/7/2025   Urinary Incontinence Screening   Bothered by leaking urine in past 6 months No         Today's PHQ-2 Score:       5/7/2025    10:48 AM   PHQ-2 ( 1999 Pfizer)   PHQ-2 Score Incomplete           5/7/2025   Substance Use   Alcohol more than 3/day or more than 7/wk No   Do you have a current opioid prescription? No   How severe/bad is pain from 1 to 10? 1/10   Do you use any other substances recreationally? No     Social History     Tobacco Use    Smoking status: Never    Smokeless tobacco: Never   Vaping Use    Vaping status: Never Used   Substance Use Topics    Alcohol use: No    Drug use: No                Reviewed and updated as needed this visit by Provider           Sexual Activity           Current providers sharing in care for this patient include:  Patient Care Team:  Fern Sheppard MD as PCP - General (Family Medicine)  Fern Sheppard MD as Assigned PCP  Tavo Taveras PA-C as Physician Assistant (Physician Assistant - Medical)  Rafaela Yadav MD  "as Assigned Heart and Vascular Provider  Hu Silva MD as Assigned Sleep Provider  Sangita Burgos Psy.D, LP as Psychologist (Neuropsychology)    The following health maintenance items are reviewed in Epic and correct as of today:  Health Maintenance   Topic Date Due    ZOSTER IMMUNIZATION (1 of 2) Never done    RSV VACCINE (1 - 1-dose 75+ series) Never done    ANNUAL REVIEW OF HM ORDERS  02/13/2024    COVID-19 Vaccine (5 - 2024-25 season) 04/10/2025    LIPID  05/01/2025    MICROALBUMIN  05/01/2025    BMP  04/25/2026    CMP  04/25/2026    HEMOGLOBIN  04/25/2026    MEDICARE ANNUAL WELLNESS VISIT  05/07/2026    FALL RISK ASSESSMENT  05/07/2026    DIABETES SCREENING  04/25/2028    DTAP/TDAP/TD IMMUNIZATION (3 - Td or Tdap) 09/11/2028    ADVANCE CARE PLANNING  05/07/2030    PHQ-2 (once per calendar year)  Completed    INFLUENZA VACCINE  Completed    Pneumococcal Vaccine: 50+ Years  Completed    URINALYSIS  Completed    HPV IMMUNIZATION  Aged Out    MENINGITIS IMMUNIZATION  Aged Out    COLORECTAL CANCER SCREENING  Discontinued           Objective    Exam  /66 (BP Location: Left arm, Patient Position: Sitting, Cuff Size: Adult Regular)   Pulse 64   Temp 97.3  F (36.3  C) (Temporal)   Ht 1.859 m (6' 1.2\")   Wt 60.5 kg (133 lb 5.8 oz)   SpO2 100%   BMI 17.50 kg/m     Estimated body mass index is 17.5 kg/m  as calculated from the following:    Height as of this encounter: 1.859 m (6' 1.2\").    Weight as of this encounter: 60.5 kg (133 lb 5.8 oz).    Physical Exam  GENERAL: alert and no distress  EYES: Eyes grossly normal to inspection, PERRL and conjunctivae and sclerae normal  HENT: ear canals and TM's normal, nose and mouth without ulcers or lesions  NECK: no adenopathy, no asymmetry, masses, or scars  RESP: lungs clear to auscultation - no rales, rhonchi or wheezes  CV: regular rate and rhythm, normal S1 S2, no S3 or S4, no murmur, click or rub, no peripheral edema  ABDOMEN: soft, nontender, no " hepatosplenomegaly, no masses and bowel sounds normal  MS: no gross musculoskeletal defects noted, no edema  SKIN: no suspicious lesions or rashes  NEURO: Normal strength and tone, mentation intact and speech normal  PSYCH: mentation appears normal, affect normal/bright        5/7/2025   Mini Cog   Clock Draw Score 2 Normal   3 Item Recall 2 objects recalled   Mini Cog Total Score 4              Signed Electronically by: Fern Sheppard MD

## 2025-05-08 ENCOUNTER — PATIENT OUTREACH (OUTPATIENT)
Dept: CARE COORDINATION | Facility: CLINIC | Age: 80
End: 2025-05-08
Payer: COMMERCIAL

## 2025-05-08 NOTE — TELEPHONE ENCOUNTER
RECORDS STATUS - ALL OTHER DIAGNOSIS      RECORDS RECEIVED FROM: [facility]   NOTES STATUS DETAILS   OFFICE NOTE from referring provider Epic 4/25/2025 - Dr. Can Collazo   MEDICATION LIST [facility]    LABS     PATHOLOGY REPORTS     ANYTHING RELATED TO DIAGNOSIS Epic 5/7/2025

## 2025-05-12 ENCOUNTER — RESULTS FOLLOW-UP (OUTPATIENT)
Dept: NEUROLOGY | Facility: CLINIC | Age: 80
End: 2025-05-12

## 2025-05-12 ENCOUNTER — ANCILLARY PROCEDURE (OUTPATIENT)
Dept: MRI IMAGING | Facility: CLINIC | Age: 80
End: 2025-05-12
Attending: PSYCHIATRY & NEUROLOGY
Payer: COMMERCIAL

## 2025-05-12 ENCOUNTER — PATIENT OUTREACH (OUTPATIENT)
Dept: CARE COORDINATION | Facility: CLINIC | Age: 80
End: 2025-05-12

## 2025-05-12 ENCOUNTER — RESULTS FOLLOW-UP (OUTPATIENT)
Dept: FAMILY MEDICINE | Facility: CLINIC | Age: 80
End: 2025-05-12

## 2025-05-12 DIAGNOSIS — R41.3 MEMORY CHANGE: ICD-10-CM

## 2025-05-12 DIAGNOSIS — R26.89 SHUFFLING GAIT: ICD-10-CM

## 2025-05-12 PROCEDURE — 70553 MRI BRAIN STEM W/O & W/DYE: CPT | Mod: GC | Performed by: RADIOLOGY

## 2025-05-12 PROCEDURE — A9585 GADOBUTROL INJECTION: HCPCS | Performed by: RADIOLOGY

## 2025-05-12 RX ORDER — GADOBUTROL 604.72 MG/ML
7.5 INJECTION INTRAVENOUS ONCE
Status: COMPLETED | OUTPATIENT
Start: 2025-05-12 | End: 2025-05-12

## 2025-05-12 RX ADMIN — GADOBUTROL 6 ML: 604.72 INJECTION INTRAVENOUS at 07:42

## 2025-05-12 NOTE — RESULT ENCOUNTER NOTE
Excellent! Please call or send a [email or web address] message if you have any questions. Fern Sheppard M.D.

## 2025-05-12 NOTE — DISCHARGE INSTRUCTIONS
MRI Contrast Discharge Instructions    The IV contrast you received today will pass out of your body in your  urine. This will happen in the next 24 hours. You will not feel this process.  Your urine will not change color.    Drink at least 4 extra glasses of water or juice today (unless your doctor  has restricted your fluids). This reduces the stress on your kidneys.  You may take your regular medicines.    If you are on dialysis: It is best to have dialysis today.    If you have a reaction: Most reactions happen right away. If you have  any new symptoms after leaving the hospital (such as hives or swelling),  call your hospital at the correct number below. Or call your family doctor.  If you have breathing distress or wheezing, call 911.    Special instructions: ***    I have read and understand the above information.    Signature:______________________________________ Date:___________    Staff:__________________________________________ Date:___________     Time:__________    [facility] Departments:    ___[place]: [phone]  ___[facility]: [phone]  ___[place]: [phone] ___[facility]: [phone]  ___[facility]: [phone]  ___[facility]: [phone]  ___[phone]  ___[facility]: [phone]  ___[phone]

## 2025-05-21 ENCOUNTER — OFFICE VISIT (OUTPATIENT)
Dept: NEUROLOGY | Facility: CLINIC | Age: 80
End: 2025-05-21
Attending: PSYCHIATRY & NEUROLOGY
Payer: COMMERCIAL

## 2025-05-21 DIAGNOSIS — R41.3 MEMORY CHANGE: ICD-10-CM

## 2025-05-21 DIAGNOSIS — G47.00 INSOMNIA, UNSPECIFIED TYPE: Primary | ICD-10-CM

## 2025-05-21 DIAGNOSIS — F43.23 ADJUSTMENT DISORDER WITH MIXED ANXIETY AND DEPRESSED MOOD: ICD-10-CM

## 2025-05-21 DIAGNOSIS — G20.C PARKINSONISM, UNSPECIFIED PARKINSONISM TYPE (H): ICD-10-CM

## 2025-05-21 NOTE — LETTER
5/21/2025      Chavez Perkins  [address]  [facility] [address]      Dear Colleague,    Thank you for referring your patient, Chavez Perkins, to the [facility]. Please see a copy of my visit note below.    NEUROPSYCHOLOGY EVALUATION  [facility]      NAME: Chavez Perkins    YOB: 1945   AGE: 79 years old  EDU: 15  DATE OF EVALUATION: 5/21/2025    REASON FOR REFERRAL:  Mr. Perkins is a 79 year-old, right-handed, White male with suspected Parkinson's disease, hypertension, hyperlipidemia, stage III chronic kidney disease, peripheral neuropathy, untreated JENNI, BPH, and insomnia. Due to concerns about cognitive decline, he was referred for this neuropsychological evaluation by neurologist, HARINDER Collazo DO, in order to assist with differential diagnosis and care planning.     SUMMARY OF FINDINGS: (please refer to Extended Report below for full details and comprehensive clinical history)  Results of testing indicate that Mr. Perkins is of estimated average-to-high average premorbid intellectual functioning, and most of Mr. Perkins's performances are generally commensurate with that estimate.  However, he exhibits variable verbal memory, with scores ranging from mildly impaired to average.  He performs much better when information to be remembered is presented to him in context (e.g., stories) as opposed to noncontextual information (e.g., a word list).  On the word list task, he exhibits a subtle encoding deficit, as his mildly impaired delayed recall is only slightly aided by prompt/cues on recognition testing.  In contrast to this variable verbal memory, his nonverbal (visual) memory is solidly intact. The only other score that is clearly below expectation is on a measure of semantic fluency, which is mildly impaired and significantly lower than his phonemic fluency.  That said, all other language abilities are solidly intact for  his age.  Lastly, he exhibits subtle, relative weakness in his learning efficiency for verbal and nonverbal information, which fall in the low average range.    All other cognitive test performances are considered to be intact, including those on measures of attention/concentration, processing speed, visual-spatial/constructional skills, nonverbal memory, and executive functioning (including mental flexibility/set shifting, cognitive inhibition, visual planning/organization, abstract reasoning, and judgment/problem-solving of hypothetical health and safety dilemmas).    Emotionally, the patient endorses mild symptoms of depression and anxiety on self-report questionnaires.  During the clinical interview, he does not report significant emotional distress or any recent stressors.    IMPRESSIONS:  Overall, the mild variability noted above is inconsistent and insufficient to support the presence of cerebral dysfunction at this time.  As such, Mr. Perkins does not meet clear criteria for a cognitive disorder at this time.  These results are fairly consistent with his reports of his cognitive functioning in the clinical interview, as he largely denies noticing any significant cognitive decline.  Variability in test scores typically suggests that situational or non-neurologic factors may be interfering with cognitive performance at times.  In Mr. Perkins' case, chronic insomnia and untreated JENNI can certainly contribute to variable cognitive performance, both on testing and in daily life.  At this time, there is no clear or consistent evidence that his suspected Parkinson's disease is directly causing cognitive symptoms.  However, ongoing neuropsychological monitoring would be appropriate.  Is also no evidence of any other neurodegenerative dementia process at this time, (including no compelling support for Alzheimer's disease, Lewy body dementia, etc.).    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     The patient is encouraged to follow-up with the Sleep Clinic to address his untreated JENNI.  He also awakens frequently due to nocturia, and finds that tamsulosin has not been helpful.  Treatment of JENNI (via Sleep Clinic) and treatment of nocturia (via his PCP or urology) could help improve his insomnia to some degree (as well as lead to improvements in his cognitive functioning and daytime energy level).      In the meantime, Mr. Perkins may benefit from evaluating his current sleep hygiene behaviors and if need be, make changes to help facilitate sleep. Relaxation exercises (e.g., listening to soothing music, deep breathing, progressive muscle relaxation) might also help with sleep initiation. If he tends to have difficulty returning to sleep in the night or in falling asleep due to worrying or ruminating, strategies could be discussed with a psychotherapist. If these techniques do not improve his sleep, he may wish to consult his physician to determine if a medication for sleep is warranted.    Given the evidence of mild mood disturbance, monitoring of his mood seems reasonable. Mr. Perkins could consider short-term psychotherapy to help manage his mood symptoms, particularly if they worsen.  If he is interested in this, he may be a good fit with psychologist, Dr. Moon Burr (located in the [facility]; [phone]).  Dr. Burr specializes in working with individuals with Parkinson's disease and issues related to aging.  A trial of antidepressant medication could also be considered, especially if his mood/anxiety worsens.      From purely a cognitive perspective, I have no significant concerns about Mr. Perkins' current independent living situation or his ability to independently perform complex daily activities (including driving, medication and financial  management, making complex decisions, etc.).  That said, occasional oversight may be appropriate to ensure ongoing safety/accuracy/compliance.    The patient is encouraged to utilize cognitive compensatory strategies in daily life, including utilizing note pads, checklists, to-do lists, a calendar/planner, labeled alarm reminders, a GPS, a pillbox, and maintaining a daily morning and nighttime routine and an organized living environment. Performing important/complex tasks or having important conversations should be done in a quiet, distraction-free environment (this includes putting away the cell phone, turning off the TV or music in the background, etc.).     If not already done, completion of paperwork for advance directives and assignment of healthcare and financial Power of  should be considered at this time.    [facility] provides several opportunities and resources for those with Parkinson's disease, including support/educational groups, fitness groups, and information about community events.  If he is interested, I can connect him with BLACK Daniels, who organizes many of these services.  The National Parkinson s Foundation (http:www.parkinson.org or 1-463.716.6764) and the American Parkinson Disease Association (https://www.apdaparkinson.org/ or 1-801.597.2069) both have information about local support groups, respite services, other community resources, as well as a breadth of informational materials.     Mr. Perkins is strongly encouraged to adhere closely to his medication regimen to help keep his cerebrovascular risk factors (e.g., hypertension, hyperlipidemia, etc.) well controlled. This may help to prevent future cognitive decline.    Mr. Perkins is encouraged to remain physically, socially, and mentally active in order to optimize his brain health.    Repeat neuropsychological testing is recommended in 18-24 months (or as clinically indicated) in order to monitor his cognitive status in  "the context of suspected Parkinson's disease. The current test data can be used as a baseline to which future comparisons can be made.      Thank you for allowing me to participate in Mr. Perkins's care. Please contact me with any questions regarding the content of this report.        Sangita Burgos PsyD, DREAD  Clinical Neuropsychologist  [address], [address]  Phone: [phone]  Fax: [phone]          --------------------------------EXTENDED REPORT--------------------------------  The following information was obtained via medical record review and by interview of the patient.    HISTORY OF PRESENTING PROBLEM:  Per medical records, Mr. Perkins establish care with neurologist, HARINDER Collazo DO, on 4/25/2025 due to symptoms of parkinsonism, neuropathy, and cognitive decline.  With regard to parkinsonism's, bradykinesia, gait impairment, stooped posture, and masked facies were observed, along with signs of peripheral neuropathy.  He was administered a brief cognitive screen (MoCA), on which he scored 24/30.  Dr. Collazo suspected that the patient may have Parkinson's disease, and started him on a trial of Sinemet.  He also referred the patient for further workup, including a brain MRI, OT for CPT assessment, relevant blood work, and this neuropsychological evaluation.    Brain MRI performed on 5/12/2025 revealed an absent swallow tail sign bilaterally, which was noted to be suspicious for Parkinson's disease.  Relevant blood work was unremarkable.  He was evaluated by Occupational Therapy who performed the CPT on 5/5/2025.  He scored 5.2/5.8 on the assessment, which indicated that the patient \"can live alone with weekly checks.\"    CURRENT CLINICAL INTERVIEW:  Today, Mr. Perkins did not endorse any significant cognitive concerns.  Upon specific questioning, he reported that he can usually remember most recent conversations and events.  He might lose his " cell phone on occasion, but he usually finds it in one of the few places.  He denied getting lost in familiar places.  He keeps a calendar on his phone and makes lists.  He is able to read and follow along with the story line just fine.  He denied any concerns about attention/concentration, slowed processing speed, or spatial processing.  He can occasionally struggle to find words, but he has not been concerned about it.    With regard to the activities of daily living, Mr. Perkins reported that he is fully independent.  He currently lives alone in his own home where he has lived for most of his life (it was his parents' home).  He feels safe and comfortable there.  He continues to drive and manage his own medications without issue.  He pays all of his bills on time, typically by check or on the computer.  He has not fallen for any financial scams.  He remains independent with regard to cooking, performing household chores and errands, and utilizing familiar appliances and devices.    MEDICAL HISTORY:  Along with the previously described parkinsonisms, today the patient additionally exhibits reduced eye blink, reduced speech volume, and reports that his handwriting has become quite small and hard to read.  He also reported that he has been falling more frequently, usually falling backward when he bends over.      Mr. Perkins' medical history is additionally significant for hypertension, hyperlipidemia, stage III chronic kidney disease, peripheral neuropathy, untreated JENNI, BPH, and insomnia.    The patient also has chronic insomnia, noting that it is difficult for him to stay asleep.  This is primarily due to nocturia, which wakes him up every couple of hours.  He noted that tamsulosin was supposed to help with that issue, but it has not been effective.  He finds that it is getting harder for him to fall back to sleep when he returns to bed.  He might listen to lectures on tape if he is struggling to fall asleep.  The  patient also has JENNI that he has not been treating.  He does not feel well rested in the morning.  He finds that he can doze off easily while reading during the day.  Known symptoms of REM sleep behavior disorder were denied.    The patient denied any history of significant head injuries, known seizure, stroke, heart attack, tremor, hallucinations, and microsmia/anosmia.     Past Surgical History:   Procedure Laterality Date     HC REMOVAL OF TONSILS,<10 Y/O  1950     STRESS ECHO (METRO)  1/06    passed     [facility] COLONOSCOPY THRU STOMA, DIAGNOSTIC  2/5/2008    Normal     Diagnostic studies:  Brain MRI dated 5/12/2025 revealed:  IMPRESSION:  1. No acute intracranial pathology.  2. Absent swallow tail sign bilaterally, suspicious for Parkinson disease.  3. Sequela of chronic small vessel ischemic disease.    Current medications include (per medical record):   Current Outpatient Medications:      carbidopa-levodopa (SINEMET)  MG tablet, Take 1 tablet by mouth 3 times daily., Disp: 180 tablet, Rfl: 0     COMPRESSION STOCKINGS, Please measure and distribute 2 pair of 20mmHg - 30mmHg knee high open or closed toe compression stockings with extra refills as indicated or what insurance will allow ., Disp: 2 each, Rfl: 4     ipratropium (ATROVENT) 0.06 % nasal spray, Spray 2 sprays into both nostrils 3 times daily., Disp: 15 mL, Rfl: 1     lisinopril (ZESTRIL) 10 MG tablet, Take 1 tablet (10 mg) by mouth 2 times daily., Disp: 200 tablet, Rfl: 3     simvastatin (ZOCOR) 40 MG tablet, Take 1 tablet (40 mg) by mouth at bedtime. For cholesterol, Disp: 100 tablet, Rfl: 3     tamsulosin (FLOMAX) 0.4 MG capsule, Take 1 capsule (0.4 mg) by mouth daily., Disp: 90 capsule, Rfl: 0    RELEVANT FAMILY MEDICAL HISTORY:   There is no known family neurologic history.  Other family medical history is positive for the following:  Family History   Problem Relation Age of Onset     Hypertension Paternal Grandmother      CATARINO. Brother       "CATARINO. Brother      Diabetes Brother      Diabetes Brother      Cerebrovascular Disease Paternal Grandfather      Allergies Other         neice     Blood Disease Brother         low hgb       PSYCHIATRIC HISTORY:  With regard to his psychiatric history, Mr. Perkins denied a history of significant mental health issues or treatment. Currently, the patient described his mood as fairly even keeled.  He did report any significant symptoms of depression or anxiety.  Recent stressors were also denied.    With regard to substance use, Mr. Perkins denied any current or historical substance abuse.     SOCIAL HISTORY:  Mr. Perkins reported that he was always a \"B average\" student.  He initially struggled with reading, writing, and math, but eventually caught up to his peers without any accommodations, grade repetitions, or special instruction.  He graduated high school and completed 3 years of college at the [employer], where he majored in Graphic Arts with a minor in Painting.  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  He spent the remainder of his career working as a  doing a variety of different jobs, including portraitures, patent drawings, creating cartoons, topography, and conducting sketches for eyewitnesses to crimes.  He helped solved several crimes with his sketches, including several kidnapping and murder cases.  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  He has never been  and has no children. The patient lives alone in his own home in [place], [place].  For leisure, he enjoys drawing, bike riding, reading, listening to lecture series, going for walks, and is a member of a [employer] club.    BEHAVIORAL OBSERVATIONS:   Mr. Perkins arrived on time and unaccompanied to today's appointment. He was " appropriately dressed and groomed. He appeared alert and engaged. No vision or hearing difficulties were observed. Conversational speech was of hypophonic, slightly monotone, and occasionally mumbled, but of normal rate. No significant word-finding difficulties or paraphasias were noted. His thought process appeared linear and goal-directed. No hallucinations or delusions were apparent. Judgment and insight appeared intact. He had masked facies so affect was fairly flat, but brightened with certain topics. Reduced eye blink was observed. His mood was euthymic. Rapport was easily established.     During the testing session, Mr. Perkins was alert throughout. He was reserved yet pleasant and cooperative throughout the evaluation. He understood test instructions without difficulty.  At times he was difficult for the examiner to understand due to soft, mumbling speech, but this did not seem to get a bleed impact his test scores.  No frontal signs were observed behaviorally. Mr. Perkins appeared adequately motivated and engaged easily during testing. His score on an embedded measure of performance validity was in the valid range. Overall, the following results are considered a reasonably valid estimation of his current cognitive abilities.    TESTS ADMINISTERED:   Wechsler Memory Scale-III (WMS-III) select subtests, Wechsler Adult Intelligence Scale-IV (WAIS-IV) select subtests, Wide Range Achievement Test-5 (WRAT-5) select subtests, Wechsler Memory Scale-IV (WMS-IV) select subtests, [place] Verbal Learning Test-3 Short Form (CVLT-3 Short), Trailmaking Test (Trails A & B), Stroop Color and Word Test, Controlled Oral Word Association Test (COWAT) and Category Fluency, [place] Naming Test-2 (BNT-2), Jossue-Osterrieth Complex Figure Test (RCFT) and Clock Drawing Test, Repeatable Battery for the Assessment of Neuropsychological Status-Update (RBANS) select subtests, Generalized Anxiety Disorder-7 Assessment (MARYLIN-7), Geriatric  Depression Scale (GDS), and Independent Living Scales (ILS) - Health & Safety subtest.    MOANS norms were used for BNT, Trail Making Test A & B, COWAT & Category Fluency, Stroop, Jossue-O Copy     DESCRIPTIVE PERFORMANCE KEY:    Labels for tests with Normal Distributions  Score Label Standard Score %ile Rank   Exceptionally high score  > 130 > 98   Above average score 120-129 91-97   High average score 110-119 75-90   Average score  25-74   Low average score 80-89 9-24   Below average score 70-79 2-8   Exceptionally low score < 70 < 2     Labels for tests with Non-Normal Distributions  Score Label %ile Rank   Within normal expectations/ limits score (WNL) > 24   Low average score 9-24   Below average score 2-8   Exceptionally low score < 2     The following test results utilize score labels as adapted from Gonzalez Lopes, Quinn Gramajo, Cindy Gaviria, CARY Crawford, Syl Gilliam, Kameron Robbins, Fabien Tinajero & Conference Participatnts (2020): American Academy of Clinical Neuropsychology consensus conference statement on uniform labeling of performance test scores, The Clinical Neuropsychologist, DOI: 10.1080/[email or web address]. All scores contain some measure of error; scores are reported here as they are obtained by the individual (without reference to the range of error). These are meant as labels and not interpretation of performance. While other relevant comments regarding task performance are provided below, please see the Summary, Impressions, and Diagnosis sections of this report for interpretation of the scores and the cognitive profile as a whole, including what does and does not constitute impairment for this particular individual.    OPTIMAL PREMORBID INTELLECT:  Optimal premorbid intellectual abilities were estimated as falling in the average-to-high average range based on Mr. ePrkins's educational and occupational histories and performance on tasks least likely to be  "affected by acquired brain dysfunction.    SUMMARY OF TEST RESULTS:  ORIENTATION. Performance on a mental status exam, assessing orientation to personal and current information, resulted in a within normal limits score. He was oriented to personal information, place, time, and date and was able to correctly name the current and previous presidents.    ATTENTION/WORKING MEMORY. The patient's score on a measure sensitive to sustained auditory-verbal attention and concentration (WAIS-IV Digit Span) was classified as average, as he was able to recite up to 5 digits forward (an average score), up to 4 digits backward (an average score), and up to 4 digits in sequence (an average score).      PROCESSING SPEED. Speeded digit-symbol coding was high average (WAIS-IV Coding). On a test of complex concentration that requires speeded numeric sequencing (Trails A), the patient's score was average for completion time and without error. On the Stroop test, speeded color naming was average, and speeded word reading was average.     LANGUAGE PROCESSING. Language comprehension appeared intact. Confrontation naming was measured as a within normal limits score (49/60; BNT-2). The patient's performance on a test of phonemic fluency resulted in a average score (COWAT), and his semantic fluency was below average (Category Fluency). 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 Little men know how, other young people will\" and then on the next line he wrote \"[place]\" which he stated is \"part of a [place] court law\").     VISUOSPATIAL/CONSTRUCTIONAL SKILLS. Visuoconstruction with three-dimensional blocks was above average (WAIS-IV Block Design). Spatial judgment, as measured by Judgment of Line Orientation, was classified as within normal limits. Copy of a complex geometric figure was also within normal limits and " well-organized in approach (RCFT Copy). Copy of simple geometric figures was within normal limits (WMS-IV Visual Reproduction Copy). On a Clock Drawing Task, the patient was able to draw a large, well-formed [place] with equally spaced and correctly placed numbers. His clock hands converged nicely in the center of the clock face and were well differentiated by length.      LEARNING/MEMORY. On the WMS-IV Logical Memory subtest, immediate memory for two paragraph-length stories resulted in a low average score. After a 20-minute delay, the patient's score on the delayed recall trial was average with a 73% retention rate (which is high average). On the recognition trial, the patient's score was classified as within normal limits.    On a 9-item verbal list-learning task (CVLT-3 Short Form), the patient acquired up to 6 words of the word list by the 4th and final learning trial (raw scores over trials = 3, 5, 6, 5). Total learning acquisition was low average. Following a brief, 30-second distractor task, the patient recalled 4 items, which was below average. After a 10-minute delay, the patient recalled 3 items, which was below average. His recall did not benefit from semantic cues (3 items; exceptionally low). Recognition discriminability was low average, as he recognized 7/9 items and made 2 false-positive errors.     On a visual memory measure (WMS-IV Visual Reproduction), immediate recall of simple geometric figures was low average, while delayed recall of the figures was average (with a 67% retention rate). Delayed recognition of the figures was below average.     EXECUTIVE FUNCTIONS. On a test of inhibition and cognitive flexibility (Stroop), the patient's score was high average for completion time and made 3 errors. On a test that requires speeded alpha-numeric sequencing/cognitive set-shifting (Trails B), performance was average and without error. Phonemic fluency was average (COWAT). On the Clock Drawing Test,  the patient was able to accurately represent analog time.     INDEPENDENT LIVING SKILLS. The patient was administered the Health & Safety subtest of the Independent Living Skills measure, which assesses the patient's judgment and problem-solving abilities as it pertains to various hypothetical health and safety dilemmas. Compared to a healthy, community-dwelling geriatric population, the patient's score was within normal limits.     MOOD. On the GDS a self report measure of depressive symptomatology, he obtained a score of 10, placing him in the range of mild depressive symptoms. On the MARYLIN-7, a self-report measure of anxiety, he obtained a score of 5,  placing him in the range of mild anxiety.    ____________________________________________________________________________________    SERVICES PROVIDED & TIME:  On-site Office Visit Details   Verbal consent for neuropsychological testing was received following the provision of information about the nature and purpose of the evaluation, and the opportunity to ask questions. Verbal permission to route a copy of the final report to her primary care provider was also obtained.  A clinical interview/neurobehavioral status examination was conducted with the patient and documented. I thoroughly reviewed the medical record, selected the neuropsychological test battery, provided supervision to the trained examiner/technician, interpreted/integrated patient data and test results, and engaged in clinical decision making, treatment planning, report writing/preparation, and provision of interactive feedback of test results on 5/29/2025. A trained examiner/technician administered and scored the neuropsychological tests (2+ tests).  Please see below for a breakdown of time spent and the associated codes billed for these services.  Services   Time Spent  CPT Codes   Neurobehavioral Status Exam:  (e.g., clinical interview and neurobehavioral status exam, interpretation, report)   59  minutes   1 x 96116     Neuropsychological Evaluation Services:   (e.g., integration, interpretation, treatment planning, clinical decision making, feedback of test results)   188 minutes   1 x 96132  2 x 96133   Neuropsychological Testing by Trained Examiner/Technician:  (e.g., test administration, scoring, 2+ tests administered)   130 minutes   1 x 96138  3 x 96139   For diagnostic and coding purposes, Mr. Perkins has a history of suspected Parkinson's disease, hypertension, hyperlipidemia, stage III chronic kidney disease, peripheral neuropathy, untreated JENNI, BPH, and insomnia. He was referred for an evaluation of mild neurocognitive disorder. Please note, all charges are filed at the completion of the Episode of Care and associated with the final encounter date (feedback session on 5/29/2025).         The patient was seen for a neuropsychological evaluation for the purposes of diagnostic clarification and treatment planning. 100 minutes of face-to-face testing were provided by this writer. An additional 30 minutes were spent scoring and compiling test results. The patient was cooperative with testing. No concerns were brought to my attention. Please see Dr. Burgos's report for a detailed description of the charges and interpretation and integration of the findings.      Again, thank you for allowing me to participate in the care of your patient.        Sincerely,        Sangita Burgos Psy.D, LP    Electronically signed

## 2025-05-21 NOTE — PROGRESS NOTES
The patient was seen for a neuropsychological evaluation for the purposes of diagnostic clarification and treatment planning. 100 minutes of face-to-face testing were provided by this writer. An additional 30 minutes were spent scoring and compiling test results. The patient was cooperative with testing. No concerns were brought to my attention. Please see Dr. Burgos's report for a detailed description of the charges and interpretation and integration of the findings.

## 2025-05-21 NOTE — PROGRESS NOTES
NEUROPSYCHOLOGY EVALUATION  [facility]      NAME: Chavez Perkins    YOB: 1945   AGE: 79 years old  EDU: 15  DATE OF EVALUATION: 5/21/2025    REASON FOR REFERRAL:  Mr. Perkins is a 79 year-old, right-handed, White male with suspected Parkinson's disease, hypertension, hyperlipidemia, stage III chronic kidney disease, peripheral neuropathy, untreated JENNI, BPH, and insomnia. Due to concerns about cognitive decline, he was referred for this neuropsychological evaluation by neurologist, HARINDER Collazo DO, in order to assist with differential diagnosis and care planning.     SUMMARY OF FINDINGS: (please refer to Extended Report below for full details and comprehensive clinical history)  Results of testing indicate that Mr. Perkins is of estimated average-to-high average premorbid intellectual functioning, and most of Mr. Perkins's performances are generally commensurate with that estimate.  However, he exhibits variable verbal memory, with scores ranging from mildly impaired to average.  He performs much better when information to be remembered is presented to him in context (e.g., stories) as opposed to noncontextual information (e.g., a word list).  On the word list task, he exhibits a subtle encoding deficit, as his mildly impaired delayed recall is only slightly aided by prompt/cues on recognition testing.  In contrast to this variable verbal memory, his nonverbal (visual) memory is solidly intact. The only other score that is clearly below expectation is on a measure of semantic fluency, which is mildly impaired and significantly lower than his phonemic fluency.  That said, all other language abilities are solidly intact for his age.  Lastly, he exhibits subtle, relative weakness in his learning efficiency for verbal and nonverbal information, which fall in the low average range.    All other cognitive test performances are considered to be intact, including those on measures of  attention/concentration, processing speed, visual-spatial/constructional skills, nonverbal memory, and executive functioning (including mental flexibility/set shifting, cognitive inhibition, visual planning/organization, abstract reasoning, and judgment/problem-solving of hypothetical health and safety dilemmas).    Emotionally, the patient endorses mild symptoms of depression and anxiety on self-report questionnaires.  During the clinical interview, he does not report significant emotional distress or any recent stressors.    IMPRESSIONS:  Overall, the mild variability noted above is inconsistent and insufficient to support the presence of cerebral dysfunction at this time.  As such, Mr. Perkins does not meet clear criteria for a cognitive disorder at this time.  These results are fairly consistent with his reports of his cognitive functioning in the clinical interview, as he largely denies noticing any significant cognitive decline.  Variability in test scores typically suggests that situational or non-neurologic factors may be interfering with cognitive performance at times.  In Mr. Perkins' case, chronic insomnia and untreated JENNI can certainly contribute to variable cognitive performance, both on testing and in daily life.  At this time, there is no clear or consistent evidence that his suspected Parkinson's disease is directly causing cognitive symptoms.  However, ongoing neuropsychological monitoring would be appropriate.  Is also no evidence of any other neurodegenerative dementia process at this time, (including no compelling support for Alzheimer's disease, Lewy body dementia, etc.).    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     The patient is encouraged to follow-up with the [facility] to address his untreated JENNI.  He also awakens frequently  due to nocturia, and finds that tamsulosin has not been helpful.  Treatment of JENNI (via Sleep Clinic) and treatment of nocturia (via his PCP or urology) could help improve his insomnia to some degree (as well as lead to improvements in his cognitive functioning and daytime energy level).      In the meantime, Mr. Perkins may benefit from evaluating his current sleep hygiene behaviors and if need be, make changes to help facilitate sleep. Relaxation exercises (e.g., listening to soothing music, deep breathing, progressive muscle relaxation) might also help with sleep initiation. If he tends to have difficulty returning to sleep in the night or in falling asleep due to worrying or ruminating, strategies could be discussed with a psychotherapist. If these techniques do not improve his sleep, he may wish to consult his physician to determine if a medication for sleep is warranted.    Given the evidence of mild mood disturbance, monitoring of his mood seems reasonable. Mr. Perkins could consider short-term psychotherapy to help manage his mood symptoms, particularly if they worsen.  If he is interested in this, he may be a good fit with psychologist, Dr. Moon Burr (located in the [facility]; [phone]).  Dr. Burr specializes in working with individuals with Parkinson's disease and issues related to aging.  A trial of antidepressant medication could also be considered, especially if his mood/anxiety worsens.      From purely a cognitive perspective, I have no significant concerns about Mr. Perkins' current independent living situation or his ability to independently perform complex daily activities (including driving, medication and financial management, making complex decisions, etc.).  That said, occasional oversight may be appropriate to ensure ongoing safety/accuracy/compliance.    The patient is encouraged to utilize cognitive compensatory strategies in daily life, including utilizing note pads,  checklists, to-do lists, a calendar/planner, labeled alarm reminders, a GPS, a pillbox, and maintaining a daily morning and nighttime routine and an organized living environment. Performing important/complex tasks or having important conversations should be done in a quiet, distraction-free environment (this includes putting away the cell phone, turning off the TV or music in the background, etc.).     If not already done, completion of paperwork for advance directives and assignment of healthcare and financial Power of  should be considered at this time.    [facility] provides several opportunities and resources for those with Parkinson's disease, including support/educational groups, fitness groups, and information about community events.  If he is interested, I can connect him with BLACK Daniesl, who organizes many of these services.  The National Parkinson s Foundation (http:www.parkinson.org or 1-711.100.2835) and the American Parkinson Disease Association (https://www.apdaparkinson.org/ or 1-403.830.5102) both have information about local support groups, respite services, other community resources, as well as a breadth of informational materials.     Mr. Perkins is strongly encouraged to adhere closely to his medication regimen to help keep his cerebrovascular risk factors (e.g., hypertension, hyperlipidemia, etc.) well controlled. This may help to prevent future cognitive decline.    Mr. Perkins is encouraged to remain physically, socially, and mentally active in order to optimize his brain health.    Repeat neuropsychological testing is recommended in 18-24 months (or as clinically indicated) in order to monitor his cognitive status in the context of suspected Parkinson's disease. The current test data can be used as a baseline to which future comparisons can be made.      Thank you for allowing me to participate in Mr. Perkins's care. Please contact me with any questions regarding the content  "of this report.        Sangita Burgos PsyD, DREAD  Clinical Neuropsychologist  [facility] [address], [address]  Phone: [phone]  Fax: [phone]          --------------------------------EXTENDED REPORT--------------------------------  The following information was obtained via medical record review and by interview of the patient.    HISTORY OF PRESENTING PROBLEM:  Per medical records, Mr. Perkins establish care with neurologist, HARINDER Collazo DO, on 4/25/2025 due to symptoms of parkinsonism, neuropathy, and cognitive decline.  With regard to parkinsonism's, bradykinesia, gait impairment, stooped posture, and masked facies were observed, along with signs of peripheral neuropathy.  He was administered a brief cognitive screen (MoCA), on which he scored 24/30.  Dr. Collazo suspected that the patient may have Parkinson's disease, and started him on a trial of Sinemet.  He also referred the patient for further workup, including a brain MRI, OT for CPT assessment, relevant blood work, and this neuropsychological evaluation.    Brain MRI performed on 5/12/2025 revealed an absent swallow tail sign bilaterally, which was noted to be suspicious for Parkinson's disease.  Relevant blood work was unremarkable.  He was evaluated by Occupational Therapy who performed the CPT on 5/5/2025.  He scored 5.2/5.8 on the assessment, which indicated that the patient \"can live alone with weekly checks.\"    CURRENT CLINICAL INTERVIEW:  Today, Mr. Perkins did not endorse any significant cognitive concerns.  Upon specific questioning, he reported that he can usually remember most recent conversations and events.  He might lose his cell phone on occasion, but he usually finds it in one of the few places.  He denied getting lost in familiar places.  He keeps a calendar on his phone and makes lists.  He is able to read and follow along with the story line just fine.  He denied any concerns " about attention/concentration, slowed processing speed, or spatial processing.  He can occasionally struggle to find words, but he has not been concerned about it.    With regard to the activities of daily living, Mr. Perkins reported that he is fully independent.  He currently lives alone in his own home where he has lived for most of his life (it was his parents' home).  He feels safe and comfortable there.  He continues to drive and manage his own medications without issue.  He pays all of his bills on time, typically by check or on the computer.  He has not fallen for any financial scams.  He remains independent with regard to cooking, performing household chores and errands, and utilizing familiar appliances and devices.    MEDICAL HISTORY:  Along with the previously described parkinsonisms, today the patient additionally exhibits reduced eye blink, reduced speech volume, and reports that his handwriting has become quite small and hard to read.  He also reported that he has been falling more frequently, usually falling backward when he bends over.      Mr. Perkins' medical history is additionally significant for hypertension, hyperlipidemia, stage III chronic kidney disease, peripheral neuropathy, untreated JENNI, BPH, and insomnia.    The patient also has chronic insomnia, noting that it is difficult for him to stay asleep.  This is primarily due to nocturia, which wakes him up every couple of hours.  He noted that tamsulosin was supposed to help with that issue, but it has not been effective.  He finds that it is getting harder for him to fall back to sleep when he returns to bed.  He might listen to lectures on tape if he is struggling to fall asleep.  The patient also has JENNI that he has not been treating.  He does not feel well rested in the morning.  He finds that he can doze off easily while reading during the day.  Known symptoms of REM sleep behavior disorder were denied.    The patient denied any history of  significant head injuries, known seizure, stroke, heart attack, tremor, hallucinations, and microsmia/anosmia.     Past Surgical History:   Procedure Laterality Date    HC REMOVAL OF TONSILS,<11 Y/O  1949    STRESS ECHO (METRO)  1/06    passed    [facility] COLONOSCOPY THRU STOMA, DIAGNOSTIC  2/5/2008    Normal     Diagnostic studies:  Brain MRI dated 5/12/2025 revealed:  IMPRESSION:  1. No acute intracranial pathology.  2. Absent swallow tail sign bilaterally, suspicious for Parkinson disease.  3. Sequela of chronic small vessel ischemic disease.    Current medications include (per medical record):   Current Outpatient Medications:     carbidopa-levodopa (SINEMET)  MG tablet, Take 1 tablet by mouth 3 times daily., Disp: 180 tablet, Rfl: 0    COMPRESSION STOCKINGS, Please measure and distribute 2 pair of 20mmHg - 30mmHg knee high open or closed toe compression stockings with extra refills as indicated or what insurance will allow ., Disp: 2 each, Rfl: 4    ipratropium (ATROVENT) 0.06 % nasal spray, Spray 2 sprays into both nostrils 3 times daily., Disp: 15 mL, Rfl: 1    lisinopril (ZESTRIL) 10 MG tablet, Take 1 tablet (10 mg) by mouth 2 times daily., Disp: 200 tablet, Rfl: 3    simvastatin (ZOCOR) 40 MG tablet, Take 1 tablet (40 mg) by mouth at bedtime. For cholesterol, Disp: 100 tablet, Rfl: 3    tamsulosin (FLOMAX) 0.4 MG capsule, Take 1 capsule (0.4 mg) by mouth daily., Disp: 90 capsule, Rfl: 0    RELEVANT FAMILY MEDICAL HISTORY:   There is no known family neurologic history.  Other family medical history is positive for the following:  Family History   Problem Relation Age of Onset    Hypertension Paternal Grandmother     C.A.D. Brother     C.A.D. Brother     Diabetes Brother     Diabetes Brother     Cerebrovascular Disease Paternal Grandfather     Allergies Other         neice    Blood Disease Brother         low hgb       PSYCHIATRIC HISTORY:  With regard to his psychiatric history, Mr. Perkins denied a history  "of significant mental health issues or treatment. Currently, the patient described his mood as fairly even keeled.  He did report any significant symptoms of depression or anxiety.  Recent stressors were also denied.    With regard to substance use, Mr. Perkins denied any current or historical substance abuse.     SOCIAL HISTORY:  Mr. Perkins reported that he was always a \"B average\" student.  He initially struggled with reading, writing, and math, but eventually caught up to his peers without any accommodations, grade repetitions, or special instruction.  He graduated high school and completed 3 years of college at the [employer], where he majored in Graphic Arts with a minor in Painting.  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  He spent the remainder of his career working as a  doing a variety of different jobs, including portraitures, patent drawings, creating cartoons, topography, and conducting sketches for eyewitnesses to crimes.  He helped solved several crimes with his sketches, including several kidnapping and murder cases.  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  He has never been  and has no children. The patient lives alone in his own home in [place], [place].  For leisure, he enjoys drawing, bike riding, reading, listening to lecture series, going for walks, and is a member of a [employer] club.    BEHAVIORAL OBSERVATIONS:   Mr. Perkins arrived on time and unaccompanied to today's appointment. He was appropriately dressed and groomed. He appeared alert and engaged. No vision or hearing difficulties were observed. Conversational speech was of hypophonic, slightly monotone, and occasionally mumbled, but of normal rate. No significant word-finding difficulties or paraphasias were noted. " His thought process appeared linear and goal-directed. No hallucinations or delusions were apparent. Judgment and insight appeared intact. He had masked facies so affect was fairly flat, but brightened with certain topics. Reduced eye blink was observed. His mood was euthymic. Rapport was easily established.     During the testing session, Mr. Perkins was alert throughout. He was reserved yet pleasant and cooperative throughout the evaluation. He understood test instructions without difficulty.  At times he was difficult for the examiner to understand due to soft, mumbling speech, but this did not seem to get a bleed impact his test scores.  No frontal signs were observed behaviorally. Mr. Perkins appeared adequately motivated and engaged easily during testing. His score on an embedded measure of performance validity was in the valid range. Overall, the following results are considered a reasonably valid estimation of his current cognitive abilities.    TESTS ADMINISTERED:   Wechsler Memory Scale-III (WMS-III) select subtests, Wechsler Adult Intelligence Scale-IV (WAIS-IV) select subtests, Wide Range Achievement Test-5 (WRAT-5) select subtests, Wechsler Memory Scale-IV (WMS-IV) select subtests, [place] Verbal Learning Test-3 Short Form (CVLT-3 Short), Trailmaking Test (Trails A & B), Stroop Color and Word Test, Controlled Oral Word Association Test (COWAT) and Category Fluency, [place] Naming Test-2 (BNT-2), Jossue-Osterrieth Complex Figure Test (RCFT) and Clock Drawing Test, Repeatable Battery for the Assessment of Neuropsychological Status-Update (RBANS) select subtests, Generalized Anxiety Disorder-7 Assessment (MARYLIN-7), Geriatric Depression Scale (GDS), and Independent Living Scales (ILS) - Health & Safety subtest.    MOANS norms were used for BNT, Trail Making Test A & B, COWAT & Category Fluency, Stroop, Jossue-O Copy     DESCRIPTIVE PERFORMANCE KEY:    Labels for tests with Normal Distributions  Score Label Standard  Score %ile Rank   Exceptionally high score  > 130 > 98   Above average score 120-129 91-97   High average score 110-119 75-90   Average score  25-74   Low average score 80-89 9-24   Below average score 70-79 2-8   Exceptionally low score < 70 < 2     Labels for tests with Non-Normal Distributions  Score Label %ile Rank   Within normal expectations/ limits score (WNL) > 24   Low average score 9-24   Below average score 2-8   Exceptionally low score < 2     The following test results utilize score labels as adapted from Gonzalez Lopes, Quinn Gramajo, Cindy Gaviria, CARY Crawford, Syl Gilliam, Kameron Robbins, Fabien Tinajero & Conference Participatnts (2020): American Academy of Clinical Neuropsychology consensus conference statement on uniform labeling of performance test scores, The Clinical Neuropsychologist, DOI: 10.1080/[email or web address]. All scores contain some measure of error; scores are reported here as they are obtained by the individual (without reference to the range of error). These are meant as labels and not interpretation of performance. While other relevant comments regarding task performance are provided below, please see the Summary, Impressions, and Diagnosis sections of this report for interpretation of the scores and the cognitive profile as a whole, including what does and does not constitute impairment for this particular individual.    OPTIMAL PREMORBID INTELLECT:  Optimal premorbid intellectual abilities were estimated as falling in the average-to-high average range based on Mr. Perkins's educational and occupational histories and performance on tasks least likely to be affected by acquired brain dysfunction.    SUMMARY OF TEST RESULTS:  ORIENTATION. Performance on a mental status exam, assessing orientation to personal and current information, resulted in a within normal limits score. He was oriented to personal information, place, time, and date and was  "able to correctly name the current and previous presidents.    ATTENTION/WORKING MEMORY. The patient's score on a measure sensitive to sustained auditory-verbal attention and concentration (WAIS-IV Digit Span) was classified as average, as he was able to recite up to 5 digits forward (an average score), up to 4 digits backward (an average score), and up to 4 digits in sequence (an average score).      PROCESSING SPEED. Speeded digit-symbol coding was high average (WAIS-IV Coding). On a test of complex concentration that requires speeded numeric sequencing (Trails A), the patient's score was average for completion time and without error. On the Stroop test, speeded color naming was average, and speeded word reading was average.     LANGUAGE PROCESSING. Language comprehension appeared intact. Confrontation naming was measured as a within normal limits score (49/60; BNT-2). The patient's performance on a test of phonemic fluency resulted in a average score (COWAT), and his semantic fluency was below average (Category Fluency). 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 Little men know how, other young people will\" and then on the next line he wrote \"Gorosexyzvaishalii\" which he stated is \"part of a [place] court law\").     VISUOSPATIAL/CONSTRUCTIONAL SKILLS. Visuoconstruction with three-dimensional blocks was above average (WAIS-IV Block Design). Spatial judgment, as measured by Judgment of Line Orientation, was classified as within normal limits. Copy of a complex geometric figure was also within normal limits and well-organized in approach (RCFT Copy). Copy of simple geometric figures was within normal limits (WMS-IV Visual Reproduction Copy). On a Clock Drawing Task, the patient was able to draw a large, well-formed [place] with equally spaced and correctly placed numbers. His clock hands converged nicely in " the center of the clock face and were well differentiated by length.      LEARNING/MEMORY. On the WMS-IV Logical Memory subtest, immediate memory for two paragraph-length stories resulted in a low average score. After a 20-minute delay, the patient's score on the delayed recall trial was average with a 73% retention rate (which is high average). On the recognition trial, the patient's score was classified as within normal limits.    On a 9-item verbal list-learning task (CVLT-3 Short Form), the patient acquired up to 6 words of the word list by the 4th and final learning trial (raw scores over trials = 3, 5, 6, 5). Total learning acquisition was low average. Following a brief, 30-second distractor task, the patient recalled 4 items, which was below average. After a 10-minute delay, the patient recalled 3 items, which was below average. His recall did not benefit from semantic cues (3 items; exceptionally low). Recognition discriminability was low average, as he recognized 7/9 items and made 2 false-positive errors.     On a visual memory measure (WMS-IV Visual Reproduction), immediate recall of simple geometric figures was low average, while delayed recall of the figures was average (with a 67% retention rate). Delayed recognition of the figures was below average.     EXECUTIVE FUNCTIONS. On a test of inhibition and cognitive flexibility (Stroop), the patient's score was high average for completion time and made 3 errors. On a test that requires speeded alpha-numeric sequencing/cognitive set-shifting (Trails B), performance was average and without error. Phonemic fluency was average (COWAT). On the Clock Drawing Test, the patient was able to accurately represent analog time.     INDEPENDENT LIVING SKILLS. The patient was administered the Health & Safety subtest of the Independent Living Skills measure, which assesses the patient's judgment and problem-solving abilities as it pertains to various hypothetical health  and safety dilemmas. Compared to a healthy, community-dwelling geriatric population, the patient's score was within normal limits.     MOOD. On the GDS a self report measure of depressive symptomatology, he obtained a score of 10, placing him in the range of mild depressive symptoms. On the MARYLIN-7, a self-report measure of anxiety, he obtained a score of 5,  placing him in the range of mild anxiety.    ____________________________________________________________________________________    SERVICES PROVIDED & TIME:  On-site Office Visit Details   Verbal consent for neuropsychological testing was received following the provision of information about the nature and purpose of the evaluation, and the opportunity to ask questions. Verbal permission to route a copy of the final report to her primary care provider was also obtained.  A clinical interview/neurobehavioral status examination was conducted with the patient and documented. I thoroughly reviewed the medical record, selected the neuropsychological test battery, provided supervision to the trained examiner/technician, interpreted/integrated patient data and test results, and engaged in clinical decision making, treatment planning, report writing/preparation, and provision of interactive feedback of test results on 5/29/2025. A trained examiner/technician administered and scored the neuropsychological tests (2+ tests).  Please see below for a breakdown of time spent and the associated codes billed for these services.  Services   Time Spent  CPT Codes   Neurobehavioral Status Exam:  (e.g., clinical interview and neurobehavioral status exam, interpretation, report)   59 minutes   1 x 96116     Neuropsychological Evaluation Services:   (e.g., integration, interpretation, treatment planning, clinical decision making, feedback of test results)   188 minutes   1 x 96132  2 x 96133   Neuropsychological Testing by Trained Examiner/Technician:  (e.g., test administration,  scoring, 2+ tests administered)   130 minutes   1 x 96138  3 x 96139   For diagnostic and coding purposes, Mr. Perkins has a history of suspected Parkinson's disease, hypertension, hyperlipidemia, stage III chronic kidney disease, peripheral neuropathy, untreated JENNI, BPH, and insomnia. He was referred for an evaluation of mild neurocognitive disorder. Please note, all charges are filed at the completion of the Episode of Care and associated with the final encounter date (feedback session on 5/29/2025).

## 2025-05-29 ENCOUNTER — VIRTUAL VISIT (OUTPATIENT)
Dept: NEUROLOGY | Facility: CLINIC | Age: 80
End: 2025-05-29
Payer: COMMERCIAL

## 2025-05-29 DIAGNOSIS — F43.23 ADJUSTMENT DISORDER WITH MIXED ANXIETY AND DEPRESSED MOOD: ICD-10-CM

## 2025-05-29 DIAGNOSIS — G20.C PARKINSONISM, UNSPECIFIED PARKINSONISM TYPE (H): ICD-10-CM

## 2025-05-29 DIAGNOSIS — G47.00 INSOMNIA, UNSPECIFIED TYPE: Primary | ICD-10-CM

## 2025-05-29 NOTE — LETTER
"5/29/2025      Chavez Perkins  [address]  [place] [address]      Dear Colleague,    Thank you for referring your patient, Chavez Perkins, to the [facility] NEUROLOGY [facility]. Please see a copy of my visit note below.    NEUROPSYCHOLOGY TELE-HEALTH FEEDBACK VISIT  [facility]    Telephone Visit  Chavez Perkins is a 79 year old male who is being evaluated via a billable telephone visit.     The patient has been notified of the following:      \"This telephone visit will be conducted via a call between you and your provider. We have found that certain health care needs can be provided without the need for a physical exam.  This service lets us provide the care you need with a phone conversation. If during the course of the call the provider feels a telephone visit is not appropriate, you will not be charged for this service.\"     Patient has given verbal consent to a Telephone visit? Yes  Consent has been obtained for this service by 1 care team member: yes.      Visit Summary  The purpose of today s appointment was to provide feedback regarding Mr. Perkins's recent neuropsychological consult completed on 5/21/2025. We began the session by discussing his experience during the evaluation. I provided Mr. Perkins with detailed feedback regarding his performance on cognitive testing and his pattern of cognitive strengths and weaknesses.  I discussed my overall impressions and recommendations and provided the opportunity for Mr. Perkins to ask any questions that he had about the evaluation. At the end of the session, he indicated that he understood the results and that I had answered all of his questions.       Sangita Burgos PsyD, LP  Licensed Clinical Neuropsychologist  [facility]  [address], [address]  [place] [address]  Phone: [phone]      Telephone Visit Details    Type of service:  Telephone Visit  Start " Time: 11:00 AM  End Time: 11:30 AM    Originating Location (pt. Location): Home    Distant Location (provider location):  [facility] Neurology [facility]    Mode of Communication:  Telephone    Chavez Perkins was evaluated via a billable telephone visit.     For diagnostic and coding purposes, Mr. Perkins was referred for an evaluation of mild neurocognitive disorder. As this is the final date for this Episode of Care (initiated on 5/21/2025) all charges for the entire Episode of Care will be filed today. Please see the 5/21/2025 evaluation for a detailed description of codes and services, including services provided today.      In brief:   1 x 96116  1 x 96132  2 x 96133  1 x 96138  3 x 96139      Again, thank you for allowing me to participate in the care of your patient.        Sincerely,        Sangita Burgos Psy.D, LP    Electronically signed

## 2025-05-29 NOTE — PROGRESS NOTES
"NEUROPSYCHOLOGY TELE-HEALTH FEEDBACK VISIT  [facility]    Telephone Visit  Chavez Perkins is a 79 year old male who is being evaluated via a billable telephone visit.     The patient has been notified of the following:      \"This telephone visit will be conducted via a call between you and your provider. We have found that certain health care needs can be provided without the need for a physical exam.  This service lets us provide the care you need with a phone conversation. If during the course of the call the provider feels a telephone visit is not appropriate, you will not be charged for this service.\"     Patient has given verbal consent to a Telephone visit? Yes  Consent has been obtained for this service by 1 care team member: yes.      Visit Summary  The purpose of today s appointment was to provide feedback regarding Mr. Perkins's recent neuropsychological consult completed on 5/21/2025. We began the session by discussing his experience during the evaluation. I provided Mr. Perkins with detailed feedback regarding his performance on cognitive testing and his pattern of cognitive strengths and weaknesses.  I discussed my overall impressions and recommendations and provided the opportunity for Mr. Perkins to ask any questions that he had about the evaluation. At the end of the session, he indicated that he understood the results and that I had answered all of his questions.       Sangita Burgos PsyD, LP  Licensed Clinical Neuropsychologist  [address], [address] [place] [address]  Phone: [phone]      Telephone Visit Details    Type of service:  Telephone Visit  Start Time: 11:00 AM  End Time: 11:30 AM    Originating Location (pt. Location): Home    Distant Location (provider location):  [facility]    Mode of Communication:  Telephone    Chavez Perkins was evaluated via a billable " telephone visit.     For diagnostic and coding purposes, Mr. Perkins was referred for an evaluation of mild neurocognitive disorder. As this is the final date for this Episode of Care (initiated on 5/21/2025) all charges for the entire Episode of Care will be filed today. Please see the 5/21/2025 evaluation for a detailed description of codes and services, including services provided today.      In brief:   1 x 96116  1 x 96132  2 x 96133  1 x 96138  3 x 96139

## 2025-05-29 NOTE — PATIENT INSTRUCTIONS
SUMMARY OF FINDINGS: (excerpt from full report dated 5/21/2025)  Results of testing indicate that Mr. Perkins is of estimated average-to-high average premorbid intellectual functioning, and most of Mr. Perkins's performances are generally commensurate with that estimate.  However, he exhibits variable verbal memory, with scores ranging from mildly impaired to average.  He performs much better when information to be remembered is presented to him in context (e.g., stories) as opposed to noncontextual information (e.g., a word list).  On the word list task, he exhibits a subtle encoding deficit, as his mildly impaired delayed recall is only slightly aided by prompt/cues on recognition testing.  In contrast to this variable verbal memory, his nonverbal (visual) memory is solidly intact. The only other score that is clearly below expectation is on a measure of semantic fluency, which is mildly impaired and significantly lower than his phonemic fluency.  That said, all other language abilities are solidly intact for his age.  Lastly, he exhibits subtle, relative weakness in his learning efficiency for verbal and nonverbal information, which fall in the low average range.    All other cognitive test performances are considered to be intact, including those on measures of attention/concentration, processing speed, visual-spatial/constructional skills, nonverbal memory, and executive functioning (including mental flexibility/set shifting, cognitive inhibition, visual planning/organization, abstract reasoning, and judgment/problem-solving of hypothetical health and safety dilemmas).    Emotionally, the patient endorses mild symptoms of depression and anxiety on self-report questionnaires.  During the clinical interview, he does not report significant emotional distress or any recent stressors.    IMPRESSIONS:  Overall, the mild variability noted above is inconsistent and insufficient to support the presence of cerebral  dysfunction at this time.  As such, Mr. Perkins does not meet clear criteria for a cognitive disorder at this time.  These results are fairly consistent with his reports of his cognitive functioning in the clinical interview, as he largely denies noticing any significant cognitive decline.  Variability in test scores typically suggests that situational or non-neurologic factors may be interfering with cognitive performance at times.  In Mr. Perkins' case, chronic insomnia and untreated JENNI can certainly contribute to variable cognitive performance, both on testing and in daily life.  At this time, there is no clear or consistent evidence that his suspected Parkinson's disease is directly causing cognitive symptoms.  However, ongoing neuropsychological monitoring would be appropriate.  Is also no evidence of any other neurodegenerative dementia process at this time, (including no compelling support for Alzheimer's disease, Lewy body dementia, etc.).    RECOMMENDATIONS:  Ongoing neurologic care and monitoring is recommended.     The patient is encouraged to follow-up with the Sleep Clinic to address his untreated JENNI.  He also awakens frequently due to nocturia, and finds that tamsulosin has not been helpful.  Treatment of JENNI (via Sleep Clinic) and treatment of nocturia (via his PCP or urology) could help improve his insomnia to some degree (as well as lead to improvements in his cognitive functioning and daytime energy level).      In the meantime, Mr. Perkins may benefit from evaluating his current sleep hygiene behaviors and if need be, make changes to help facilitate sleep. Relaxation exercises (e.g., listening to soothing music, deep breathing, progressive muscle relaxation) might also help with sleep initiation. If he tends to have difficulty returning to sleep in the night or in falling asleep due to worrying or ruminating, strategies could be discussed with a psychotherapist. If these techniques do not improve his  sleep, he may wish to consult his physician to determine if a medication for sleep is warranted.    Given the evidence of mild mood disturbance, monitoring of his mood seems reasonable. Mr. Perkins could consider short-term psychotherapy to help manage his mood symptoms, particularly if they worsen.  If he is interested in this, he may be a good fit with psychologist, Dr. Moon Burr (located in the [facility] [facility]; [phone]).  Dr. Burr specializes in working with individuals with Parkinson's disease and issues related to aging.  A trial of antidepressant medication could also be considered, especially if his mood/anxiety worsens.      From purely a cognitive perspective, I have no significant concerns about Mr. Perkins' current independent living situation or his ability to independently perform complex daily activities (including driving, medication and financial management, making complex decisions, etc.).  That said, occasional oversight may be appropriate to ensure ongoing safety/accuracy/compliance.    The patient is encouraged to utilize cognitive compensatory strategies in daily life, including utilizing note pads, checklists, to-do lists, a calendar/planner, labeled alarm reminders, a GPS, a pillbox, and maintaining a daily morning and nighttime routine and an organized living environment. Performing important/complex tasks or having important conversations should be done in a quiet, distraction-free environment (this includes putting away the cell phone, turning off the TV or music in the background, etc.).     If not already done, completion of paperwork for advance directives and assignment of healthcare and financial Power of  should be considered at this time.    [facility] provides several opportunities and resources for those with Parkinson's disease, including support/educational groups, fitness groups, and information about community events.  If he is interested, I can  connect him with BLACK Daniels, who organizes many of these services.  The National Parkinson s Foundation (http:www.parkinson.org or 1-935.290.2431) and the American Parkinson Disease Association (https://www.apdaparkinson.org/ or 1-608.143.4468) both have information about local support groups, respite services, other community resources, as well as a breadth of informational materials.     Mr. Perkins is strongly encouraged to adhere closely to his medication regimen to help keep his cerebrovascular risk factors (e.g., hypertension, hyperlipidemia, etc.) well controlled. This may help to prevent future cognitive decline.    Mr. Perkins is encouraged to remain physically, socially, and mentally active in order to optimize his brain health.    Repeat neuropsychological testing is recommended in 18-24 months (or as clinically indicated) in order to monitor his cognitive status in the context of suspected Parkinson's disease. The current test data can be used as a baseline to which future comparisons can be made.      Thank you for allowing me to participate in Mr. Perkins' care. Please contact me with any questions regarding the content of this report.        Sangita Burgos PsyD,   Clinical Neuropsychologist  [facility]rology [address], [place] [address]  Phone: [phone]  Fax: [phone]      Cognitive Strategies  Take notes! Keep a small notepad with you in your purse/pocket/bag and write things down that you want to remember. You might also keep a notepad in a convenient location in your home (e.g., next to your telephone or calendar). Taking your notes in a note pad (instead of on multiple post-it notes) might help you stay organized, and you will also remember where to go to look for the notes that you took.  Create checklists, grocery lists, and to-do lists. Cross things off as you finish them.  Use a calendar or planner and get in the habit of checking it daily. Make it  a part of your morning and/or nighttime routine to remind yourself of upcoming events, activities, or appointments. Cross the days off as they pass so that you can quickly glance at the calendar to know what day it is and what you have going on.  Set alarm reminders. For example, you can set an alarm to remind you to take your medications, turn off the oven, turn off the sprinkler outside, or to start getting ready for your appointment. If you use a smart phone, often times you can label the alarm that goes off so that you know what the alarm is for when it chimes.  Use a pillbox to follow your medication regimen. A pillbox acts as a nice visual cue to remind us to take our medications. If you have trouble remembering whether or not you have already taken your medications today, a pillbox can be extremely helpful to help with this issue.  Use a GPS when driving to help you stay on route.  When having an important conversation or completing an important task, reduce as many distractions in the environment as you can. For example, turn off the TV or the music in the background. Turn off or silence your cell phone. Clear your work area of everything that you do not need for the task at hand.  Establish set locations for certain items. For example, if you keep your keys on a hook by your door, you will always know where to go to look for them.   Maintain a daily routine, especially for morning and nighttime tasks.      Sleep Hygiene    What is Sleep Hygiene?  'Sleep hygiene' is the term used to describe good sleep habits. Considerable research has gone into developing a set of guidelines and tips which are designed to enhance good sleeping, and there is much evidence to suggest that these strategies can provide long-term solutions to sleep difficulties.     Sleep Hygiene Tips:  1) Get regular. One of the best ways to train your body to sleep well is to go to bed and get up at more or less the same time every day, even  on weekends and days off! This regular rhythm will make you feel better and will give your body something to work from.    2) Sleep when sleepy. Only try to sleep when you actually feel tired or sleepy, rather than spending too much time awake in bed.    3) Get up & try again. If you haven't been able to get to sleep after about 20 minutes or more, get up and do something calming or boring until you feel sleepy, then return to bed and try again. Sit quietly on the couch with the lights off (bright light will tell your brain that it is time to wake up), or read something boring like the phone book. Avoid doing anything that is too stimulating or interesting, as this will wake you up even more.    4) Avoid caffeine & nicotine. It is best to avoid consuming any caffeine (in coffee, tea, cola drinks, chocolate, and some medications) or nicotine (cigarettes) for at least 4-6 hours before going to bed. These substances act as stimulants and interfere with the ability to fall asleep.    5) Avoid alcohol. It is also best to avoid alcohol for at least 4-6 hours before going to bed. Many people believe that alcohol is relaxing and helps them to get to sleep at first, but it actually interrupts the quality of sleep.    6) Bed is for sleeping. Try not to use your bed for anything other than sleeping and sex, so that your body comes to associate bed with sleep. If you use bed as a place to watch TV, eat, read, work on your laptop, pay bills, and other things, your body will not learn this connection.    7) No naps. It is best to avoid taking naps during the day, to make sure that you are tired at bedtime. If you can't make it through the day without a nap, make sure it is for less than an hour and before 3pm.    8) Sleep rituals. You can develop your own rituals of things to remind your body that it is time to sleep - some people find it useful to do relaxing stretches or breathing exercises for 15 minutes before bed each night,  or sit calmly with a cup of caffeine-free tea.    9) Bath time. Having a hot bath 1-2 hours before bedtime can be useful, as it will raise your body temperature, causing you to feel sleepy as your body temperature drops again. Research shows that sleepiness is associated with a drop in body temperature.    10) No clock-watching. Many people who struggle with sleep tend to watch the clock too much. Frequently checking the clock during the night can wake you up (especially if you turn on the light to read the time) and reinforces negative thoughts such as  Oh no, look how late it is, I'll never get to sleep  or  it's so early, I have only slept for 5 hours, this is terrible.     11) Use a sleep diary. This worksheet can be a useful way of making sure you have the right facts about your sleep, rather than making assumptions. Because a diary involves watching the clock (see point 10) it is a good idea to only use it for two weeks to get an idea of what is going and then perhaps two months down the track to see how you are progressing.    12) Exercise. Regular exercise is a good idea to help with good sleep, but try not to do strenuous exercise in the 4 hours before bedtime. Morning walks are a great way to start the day feeling refreshed!    13) Eat right. A healthy, balanced diet will help you to sleep well, but timing is important. Some people find that a very empty stomach at bedtime is distracting, so it can be useful to have a light snack, but a heavy meal soon before bed can also interrupt sleep. Some people recommend a warm glass of milk, which contains tryptophan, which acts as a natural sleep inducer.    14) The right space. It is very important that your bed and bedroom are quiet and comfortable for sleeping. A cooler room with enough blankets to stay warm is best, and make sure you have curtains or an eye mask to block out early morning light and earplugs if there is noise outside your room.    15) Keep  daytime routine the same. Even if you have a bad night sleep and are tired it is important that you try to keep your daytime activities the same as you had planned. That is, don't avoid activities because you feel tired. This can reinforce the insomnia.    16) Take time to plan during the day. Do your thoughts keep you up all night? It is common for people to plan out the next day or run through their mental to-do list when they lay down to sleep. Instead of keeping your mind active in this way at night, take time to plan out the next day or week in the morning or afternoon and write down your plan/to-do list in a notebook or calendar. That way, when your thoughts drift to planning at night, you can put your mind at ease more quickly by reminding yourself that everything is already planned out.      Relaxation Techniques    Search on YouTube and follow along with the videos:  Diaphragmatic (deep) Breathing  Progressive Muscle Relaxation  Guided Imagery/Visualization  Meditation  Mindfulness activities    Relaxation Apps (guided relaxation audios/videos):  Calm  Head Space  Virtual Hope Box    Other Relaxing Activities:  Get some fresh air  Exercise/go for a walk  Take a warm bath  Adult coloring books  Relaxing music  Other activities you enjoy...

## 2025-06-27 DIAGNOSIS — G20.C PARKINSONISM, UNSPECIFIED PARKINSONISM TYPE (H): ICD-10-CM

## 2025-06-27 NOTE — TELEPHONE ENCOUNTER
Neuroscience Clinic Task Note    TASK    Neurology Rx Refill  Medication carbidopa-levodopa (SINEMET)  MG tablet    Dose Take 1 tablet by mouth 3 times daily. - Oral    Last refill ordered (m/d/y) 04/25/2025   Last quantity ordered 180   Last # refills 0   Last clinic visit with ordering provider (m/d/y) 04/25/2025   Next clinic visit with ordering provider (m/d/y) 09/19/2025   All pertinent protocol data (lab date/result)    Pertinent information from patient's message        FOLLOW-UP      ADDITIONAL COMMENTS      Sera Serra

## 2025-06-30 RX ORDER — CARBIDOPA AND LEVODOPA 25; 100 MG/1; MG/1
1 TABLET ORAL 3 TIMES DAILY
Qty: 180 TABLET | Refills: 0 | Status: SHIPPED | OUTPATIENT
Start: 2025-06-30

## 2025-07-02 ENCOUNTER — NURSE TRIAGE (OUTPATIENT)
Dept: FAMILY MEDICINE | Facility: CLINIC | Age: 80
End: 2025-07-02
Payer: COMMERCIAL

## 2025-07-02 ENCOUNTER — TELEPHONE (OUTPATIENT)
Dept: NEUROLOGY | Facility: CLINIC | Age: 80
End: 2025-07-02
Payer: COMMERCIAL

## 2025-07-02 NOTE — TELEPHONE ENCOUNTER
Covering Clinicians-Please review and advise if agree with plan.  Recommended disposition requires second level triage.    Call received from patient:  Dizzy  Duration: a few weeks ago  Constant   No vertigo  Able to ambulate   Closing eyes makes dizziness worse  Change in position does not make dizziness worse  No difficulty breathing  No chest pain  Afebrile  No emesis  No diarrhea  Wants an appt next week  Fluid intake is about 2 glasses of milk per day  Does not check home blood pressure    No appts available today nor tomorrow and clinic is closed on 7/4/25 due to holiday.    Patient prefers office visit over Urgent Care.    Appt scheduled with NEFTALI Dahl PA-C, on 7/7/25 at 1420.  Visit date, time and location confirmed with patient.    Encouraged patient to call back and ask to speak with a nurse for any new, changing or worsening symptoms. Registered Nurses are available via phone 24/7.    See Care Advice section of encounter for home care measures reviewed with patient.    JEN Knapp, RN-BC  [facility]        Reason for Disposition   Taking a medicine that could cause dizziness (e.g., blood pressure medications, diuretics)    Additional Information   Negative: SEVERE difficulty breathing (e.g., struggling for each breath, speaks in single words)   Negative: Shock suspected (e.g., cold/pale/clammy skin, too weak to stand, low BP, rapid pulse)   Negative: Difficult to awaken or acting confused (e.g., disoriented, slurred speech)   Negative: Fainted, and still feels dizzy afterwards   Negative: Overdose (accidental or intentional) of medications   Negative: New neurologic deficit that is present now: * Weakness of the face, arm, or leg on one side of the body * Numbness of the face, arm, or leg on one side of the body * Loss of speech or garbled speech   Negative: Heart beating < 50 beats per minute OR > 140 beats per minute   Negative: Sounds like a life-threatening  REPORT-ID:[id number] 
 
STUDY:   VENOUS DOPPLER ULTRASOUND - RIGHT LOWER EXTREMITY 
 
REASON FOR EXAM:   Female, 39 years old.  rt leg pain/ swelling 
 
TECHNIQUE:   Ultrasound evaluation of the deep vein system to include 
gray-scale imaging and compression was performed. Gray-scale imaging and 
Doppler sonographic evaluation, including duplex spectral analysis and 
qualitative color flow sonography, was performed. 
 
COMPARISON:   None. 
___________________________________ 
 
FINDINGS: 
Common Femoral Vein:  Normal compression, spontaneity and augmentation. 
Normal color Doppler. 
Common Femoral Vein/Greater Saphenous Junction:  Normal compression, 
spontaneity and augmentation.  Normal color Doppler. 
Deep Femoral Vein:  Normal compression, spontaneity and augmentation. 
Normal color Doppler. 
 
Femoral Proximal:  Normal compression, spontaneity and augmentation. 
Normal color Doppler. 
Femoral Middle:  Normal compression, spontaneity and augmentation.  Normal 
color Doppler. 
Femoral Distal:  Normal compression, spontaneity and augmentation.  Normal 
color Doppler. 
 
Popliteal Vein:  Normal compression, spontaneity and augmentation.  Normal 
color Doppler. 
 
Posterior Tibial Vein:  Normal compression, spontaneity and augmentation. 
Normal color Doppler. 
Peroneal Vein:  Normal compression, spontaneity and augmentation.  Normal 
color Doppler. 
 
Incidental finding of superficial thrombophlebitis of the GSV below the 
knee 
___________________________________ 
 
ORDER #: [id number] US/Venous Duplex Imag/Limited/Uni  
IMPRESSION:  
No evidence for deep venous thrombosis.  
 
  
Electronically Signed:  
Phoenix Sloan MD  
2024/09/03 at 18:50 EDT  
Reading Location ID and State: 1006 / [place]  
Tel [phone], Service support  [phone], Fax [phone] emergency to the triager   Negative: Chest pain   Negative: Rectal bleeding, bloody stool, or tarry-black stool   Negative: Vomiting is main symptom   Negative: Diarrhea is main symptom   Negative: Headache is main symptom   Negative: Heat exhaustion suspected (i.e., dehydration from heat exposure)   Negative: Patient states that they are having an anxiety or panic attack   Negative: Dizziness from low blood sugar (i.e., < 60 mg/dl or 3.5 mmol/l)   Negative: SEVERE dizziness (e.g., unable to stand, requires support to walk, feels like passing out now)   Negative: SEVERE headache or neck pain   Negative: Spinning or tilting sensation (vertigo) present now and one or more stroke risk factors (i.e., hypertension, diabetes mellitus, prior stroke/TIA, heart attack, age over 60) (Exception: Prior physician evaluation for this AND no different/worse than usual.)   Negative: Neurologic deficit that was brief (now gone), ANY of the following:* Weakness of the face, arm, or leg on one side of the body* Numbness of the face, arm, or leg on one side of the body* Loss of speech or garbled speech   Negative: Loss of vision or double vision  (Exception: Similar to previous migraines.)   Negative: Extra heartbeats, irregular heart beating, or heart is beating very fast (i.e., 'palpitations')   Negative: Difficulty breathing   Negative: Drinking very little and dehydration suspected (e.g., no urine > 12 hours, very dry mouth, very lightheaded)   Negative: Follows bleeding (e.g., stomach, rectum, vagina)  (Exception: Became dizzy from sight of small amount blood.)   Negative: Patient sounds very sick or weak to the triager   Negative: Lightheadedness (dizziness) present now, after 2 hours of rest and fluids   Negative: Spinning or tilting sensation (vertigo) present now   Negative: Fever > 103 F (39.4 C)   Negative: Fever > 100.0 F (37.8 C) and has diabetes mellitus or a weak immune system (e.g., HIV positive, cancer chemotherapy,  organ transplant, splenectomy, chronic steroids)   Negative: MODERATE dizziness (e.g., interferes with normal activities)  (Exception: Dizziness caused by heat exposure, sudden standing, or poor fluid intake.)   Negative: Vomiting occurs with dizziness   Negative: Patient wants to be seen    Protocols used: Dizziness-A-OH

## 2025-07-02 NOTE — TELEPHONE ENCOUNTER
Agree with disposition saumya with onset several weeks ago, if acute onset neurologic change should go to ER.    Karen Torres,   Internal Medicine - Pediatrics Physician  [facility]

## 2025-07-02 NOTE — TELEPHONE ENCOUNTER
Health Call Center    Phone Message    May a detailed message be left on voicemail: yes     Reason for Call: Medication Question or concern regarding medication   Prescription Clarification  Name of Medication: carbidopa-levodopa (SINEMET)  MG tablet   Prescribing Provider:     Can Collazo DO      Pharmacy:   [facility] - [place], [place] - [address]      What on the order needs clarification? Patient would like to know if he should continue taking this medication. Patient can be reached at .      Action Taken: Message routed to:  [facility] ([facility] Neurology    Travel Screening: Not Applicable                                                                     no

## 2025-07-02 NOTE — TELEPHONE ENCOUNTER
Per LOV:  Plan:  MRI brain w/wout contrast  Sinemet 25/100 TID  OT for CPT, Neuropsychology testing  CBC, CMP, SPEP, Immunofixation, B12, TSH, RPR    Reports dizziness, but does not interfere with ADLs. He is also currently on BP meds which could be contributing. Advised that he reach out to PCP for BP check as he is not able to take his at home. Continue with Sinemet as prescribed until follow-up on 9/19. Pt agreeable to plan. Clinic # provided.

## 2025-07-07 ENCOUNTER — OFFICE VISIT (OUTPATIENT)
Dept: FAMILY MEDICINE | Facility: CLINIC | Age: 80
End: 2025-07-07
Payer: COMMERCIAL

## 2025-07-07 VITALS
WEIGHT: 133.5 LBS | RESPIRATION RATE: 20 BRPM | TEMPERATURE: 98.2 F | OXYGEN SATURATION: 97 % | BODY MASS INDEX: 17.69 KG/M2 | HEIGHT: 73 IN

## 2025-07-07 DIAGNOSIS — N40.1 BENIGN PROSTATIC HYPERPLASIA WITH NOCTURIA: ICD-10-CM

## 2025-07-07 DIAGNOSIS — I10 ESSENTIAL HYPERTENSION WITH GOAL BLOOD PRESSURE LESS THAN 140/90: ICD-10-CM

## 2025-07-07 DIAGNOSIS — R42 ORTHOSTATIC DIZZINESS: Primary | ICD-10-CM

## 2025-07-07 DIAGNOSIS — R35.1 BENIGN PROSTATIC HYPERPLASIA WITH NOCTURIA: ICD-10-CM

## 2025-07-07 PROCEDURE — 99213 OFFICE O/P EST LOW 20 MIN: CPT | Performed by: STUDENT IN AN ORGANIZED HEALTH CARE EDUCATION/TRAINING PROGRAM

## 2025-07-07 PROCEDURE — 1126F AMNT PAIN NOTED NONE PRSNT: CPT | Performed by: STUDENT IN AN ORGANIZED HEALTH CARE EDUCATION/TRAINING PROGRAM

## 2025-07-07 ASSESSMENT — PAIN SCALES - GENERAL: PAINLEVEL_OUTOF10: NO PAIN (0)

## 2025-07-07 NOTE — PATIENT INSTRUCTIONS
Stop flomax.   Can continue all other medications.   If dizziness worsens or you develop chest pain, palpations, pass out, acute vision loss, severe headache, weakness, or fever, then come back in to be evaluated.

## 2025-07-07 NOTE — PROGRESS NOTES
Assessment & Plan     Orthostatic dizziness  Essential hypertension with goal blood pressure less than 140/90  Benign prostatic hyperplasia with nocturia  Orthostatic vitals are significant for a drop in the systolic blood pressure of over 20 points when going from lying to sitting/standing otherwise vitals are within normal limits.  Completely asymptomatic outside of dizziness with position changes which does correlate with recent start of Flomax, which I explained is also her blood pressure medicine in addition to her treatment for benign proximal hyperplasia symptoms.  No other red flag symptoms to indicate a central or cardiac cause.  No supporting evidence for vestibular cause or migraine.  No comorbid anxiety.  I instructed patient to stop Flomax today, continue other medicines otherwise with no change observe for improvement in symptoms.  I will have nursing team call on Friday to see if the dizziness has resolved with cessation of Flomax.  If dizziness is persistent, obtain basic lab work-up and refer to ENT. Referral placed to [facility] to try to get him in to urologist sooner per patient request to discuss BPH treatment.  Otherwise counseled him on red flag symptoms that would warrant return to clinic/ER for further evaluation: dizziness worsens or you develop chest pain, palpations, pass out, acute vision loss, severe headache, weakness, or fever, then come back in to be evaluated.     - Adult Urology  Referral; Future            Subjective   Al is a 79 year old, presenting for the following health issues:  Dizziness        7/7/2025     2:22 PM   Additional Questions   Roomed by Michelle BYRNE MA   Accompanied by Self         7/7/2025     2:22 PM   Patient Reported Additional Medications   Patient reports taking the following new medications None     History of Present Illness       Reason for visit:  Dizzyness  Symptom onset:  3-4 weeks ago  Symptoms include:  Dizzynes  Symptom intensity:   "Moderate  Symptom progression:  Staying the same  Had these symptoms before:  No  What makes it worse:  No  What makes it better:  No He is missing 1 dose(s) of medications per week.  He is not taking prescribed medications regularly due to side effects.        Al developed mitten dizziness with position changes roughly 4 weeks ago.  He states that this dizziness onset after he restarted the Flomax medicine for his urinary symptoms.  He states that the Flomax has not helped with his nocturia or urinary frequency symptoms.  He denies any dysuria, hematuria, or fever.  Describes dizziness as intermittent and only with position changes, then resolve after he has been in the new position for a few seconds.  Denies any associated hearing loss, tinnitus, headache, chest pain, palpitations, shortness of breath, syncope, weakness, acute vision change/loss, cognition or speech change, drooping of the face, or anxiety.  Otherwise he is feeling well without any other symptoms.  He is not dizzy at the appointment in general outside of during orthostatic vitals. Blood work in last 3 months have been grossly normal including CBC, TSH, CMP, Urine Albumin.                   Objective    Temp 98.2  F (36.8  C) (Temporal)   Resp 20   Ht 1.859 m (6' 1.2\")   Wt 60.6 kg (133 lb 8 oz)   SpO2 97%   BMI 17.52 kg/m    Body mass index is 17.52 kg/m .  Physical Exam   GENERAL: alert and no distress  EYES: Eyes grossly normal to inspection, PERRL and conjunctivae and sclerae normal  HENT: ear canals and TM's normal, nose and mouth without ulcers or lesions. Negative Dik-Hallpike test bilaterally.   NECK: no adenopathy, no asymmetry, masses, or scars  RESP: lungs clear to auscultation - no rales, rhonchi or wheezes  CV: regular rate and rhythm, normal S1 S2, no S3 or S4, no murmur, click or rub, no peripheral edema  ABDOMEN: soft, nontender, no hepatosplenomegaly, no masses and bowel sounds normal  MS: no gross musculoskeletal defects " noted, no edema  SKIN: no suspicious lesions or rashes  NEURO: CN 2-12 intact. Normal strength and tone, mentation intact and speech normal. Normal finger to nose, finger to finger, and heel to shin testing. 2+ bilateral bicep and patellar refluxes.   PSYCH: mentation appears normal, affect normal/bright            Signed Electronically by: Gaston Dahl PA-C

## 2025-07-08 ENCOUNTER — PATIENT OUTREACH (OUTPATIENT)
Dept: CARE COORDINATION | Facility: CLINIC | Age: 80
End: 2025-07-08
Payer: COMMERCIAL

## 2025-07-29 ENCOUNTER — OFFICE VISIT (OUTPATIENT)
Dept: FAMILY MEDICINE | Facility: CLINIC | Age: 80
End: 2025-07-29
Payer: COMMERCIAL

## 2025-07-29 ENCOUNTER — TRANSFERRED RECORDS (OUTPATIENT)
Dept: HEALTH INFORMATION MANAGEMENT | Facility: CLINIC | Age: 80
End: 2025-07-29

## 2025-07-29 VITALS
HEART RATE: 64 BPM | RESPIRATION RATE: 14 BRPM | HEIGHT: 73 IN | OXYGEN SATURATION: 98 % | TEMPERATURE: 97.3 F | WEIGHT: 133.1 LBS | SYSTOLIC BLOOD PRESSURE: 124 MMHG | BODY MASS INDEX: 17.64 KG/M2 | DIASTOLIC BLOOD PRESSURE: 70 MMHG

## 2025-07-29 DIAGNOSIS — R35.1 BENIGN PROSTATIC HYPERPLASIA WITH NOCTURIA: ICD-10-CM

## 2025-07-29 DIAGNOSIS — J31.0 GUSTATORY RHINITIS: ICD-10-CM

## 2025-07-29 DIAGNOSIS — G62.9 PERIPHERAL POLYNEUROPATHY: ICD-10-CM

## 2025-07-29 DIAGNOSIS — I12.9 BENIGN HYPERTENSION WITH CKD (CHRONIC KIDNEY DISEASE) STAGE III (H): Primary | ICD-10-CM

## 2025-07-29 DIAGNOSIS — N18.30 STAGE 3 CHRONIC KIDNEY DISEASE, UNSPECIFIED WHETHER STAGE 3A OR 3B CKD (H): ICD-10-CM

## 2025-07-29 DIAGNOSIS — N18.30 BENIGN HYPERTENSION WITH CKD (CHRONIC KIDNEY DISEASE) STAGE III (H): Primary | ICD-10-CM

## 2025-07-29 DIAGNOSIS — N40.1 BENIGN PROSTATIC HYPERPLASIA WITH NOCTURIA: ICD-10-CM

## 2025-07-29 DIAGNOSIS — G20.C PARKINSONISM, UNSPECIFIED PARKINSONISM TYPE (H): ICD-10-CM

## 2025-07-29 DIAGNOSIS — E78.5 HYPERLIPIDEMIA LDL GOAL <130: ICD-10-CM

## 2025-07-29 DIAGNOSIS — R42 ORTHOSTATIC DIZZINESS: ICD-10-CM

## 2025-07-29 LAB
ALBUMIN SERPL BCG-MCNC: 4.1 G/DL (ref 3.5–5.2)
ALP SERPL-CCNC: 78 U/L (ref 40–150)
ALT SERPL W P-5'-P-CCNC: 6 U/L (ref 0–70)
ANION GAP SERPL CALCULATED.3IONS-SCNC: 10 MMOL/L (ref 7–15)
AST SERPL W P-5'-P-CCNC: 36 U/L (ref 0–45)
BILIRUB SERPL-MCNC: 0.7 MG/DL
BUN SERPL-MCNC: 24.5 MG/DL (ref 8–23)
CALCIUM SERPL-MCNC: 9.3 MG/DL (ref 8.8–10.4)
CHLORIDE SERPL-SCNC: 105 MMOL/L (ref 98–107)
CREAT SERPL-MCNC: 1.17 MG/DL (ref 0.67–1.17)
EGFRCR SERPLBLD CKD-EPI 2021: 63 ML/MIN/1.73M2
ERYTHROCYTE [DISTWIDTH] IN BLOOD BY AUTOMATED COUNT: 12.8 % (ref 10–15)
GLUCOSE SERPL-MCNC: 101 MG/DL (ref 70–99)
HCO3 SERPL-SCNC: 25 MMOL/L (ref 22–29)
HCT VFR BLD AUTO: 41.9 % (ref 40–53)
HGB BLD-MCNC: 13.6 G/DL (ref 13.3–17.7)
MCH RBC QN AUTO: 33 PG (ref 26.5–33)
MCHC RBC AUTO-ENTMCNC: 32.5 G/DL (ref 31.5–36.5)
MCV RBC AUTO: 102 FL (ref 78–100)
PLATELET # BLD AUTO: 143 10E3/UL (ref 150–450)
POTASSIUM SERPL-SCNC: 4.8 MMOL/L (ref 3.4–5.3)
PROT SERPL-MCNC: 6.4 G/DL (ref 6.4–8.3)
RBC # BLD AUTO: 4.12 10E6/UL (ref 4.4–5.9)
SODIUM SERPL-SCNC: 140 MMOL/L (ref 135–145)
TSH SERPL DL<=0.005 MIU/L-ACNC: 1.93 UIU/ML (ref 0.3–4.2)
WBC # BLD AUTO: 5.6 10E3/UL (ref 4–11)

## 2025-07-29 PROCEDURE — G2211 COMPLEX E/M VISIT ADD ON: HCPCS | Performed by: FAMILY MEDICINE

## 2025-07-29 PROCEDURE — 3078F DIAST BP <80 MM HG: CPT | Performed by: FAMILY MEDICINE

## 2025-07-29 PROCEDURE — 3074F SYST BP LT 130 MM HG: CPT | Performed by: FAMILY MEDICINE

## 2025-07-29 PROCEDURE — 99214 OFFICE O/P EST MOD 30 MIN: CPT | Performed by: FAMILY MEDICINE

## 2025-07-29 PROCEDURE — 84443 ASSAY THYROID STIM HORMONE: CPT | Performed by: FAMILY MEDICINE

## 2025-07-29 PROCEDURE — 85027 COMPLETE CBC AUTOMATED: CPT | Performed by: FAMILY MEDICINE

## 2025-07-29 PROCEDURE — 80053 COMPREHEN METABOLIC PANEL: CPT | Performed by: FAMILY MEDICINE

## 2025-07-29 PROCEDURE — 36415 COLL VENOUS BLD VENIPUNCTURE: CPT | Performed by: FAMILY MEDICINE

## 2025-07-29 PROCEDURE — 1126F AMNT PAIN NOTED NONE PRSNT: CPT | Performed by: FAMILY MEDICINE

## 2025-07-29 ASSESSMENT — PAIN SCALES - GENERAL: PAINLEVEL_OUTOF10: NO PAIN (0)

## 2025-07-29 NOTE — PROGRESS NOTES
Assessment & Plan       ICD-10-CM    1. Benign hypertension with CKD (chronic kidney disease) stage III (H)  I12.9 TSH with free T4 reflex    N18.30 TSH with free T4 reflex      2. Benign prostatic hyperplasia with nocturia  N40.1     R35.1       3. Stage 3 chronic kidney disease, unspecified whether stage 3a or 3b CKD (H)  N18.30       4. HYPERLIPIDEMIA LDL GOAL <130  E78.5       5. Orthostatic dizziness  R42 Comprehensive metabolic panel (BMP + Alb, Alk Phos, ALT, AST, Total. Bili, TP)     CBC with platelets     Comprehensive metabolic panel (BMP + Alb, Alk Phos, ALT, AST, Total. 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 Parkinsonism, unspecified Parkinsonism type (H)  G20.C       8. Peripheral polyneuropathy  G62.9            Orthostatic hypotension  He did not notice improvement in symptoms after stopping the flomax.   He notes the dizziness started shortly after starting Sinemet. Orthostatic hypotension is a possible side effect of the Sinemet and is dose related. I will send a note to Dr. Collazo. He also has follow up scheduled in September  Will complete labs today for further evaluation.     Memory change  Parkinsonism  Peripheral neuropathy  Saw Dr. Collazo 4/25/25 and OT   Currently lives alone in his home, continues to drive and denies any difficulty doing so. 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 Active in his [employer].    Discussed considering moving into a senior living apartment with access to additional care if needed in the future. He is open to the possibility, but currently reluctant to consider. He says his niece would likely visit places with him if he wanted her to. Will check back with him at follow up in 2-3 months.      Recommended follow up with Dr. Collazo to discuss possible adjustment of sinemet dose due to dizziness and orthostatic hypotension (possibly a side effect of sinemet)    Per neuro visit notes:  "  \"Assessment:  - Parkinsonism  - Memory changes, MCI   - peripheral neuropathy     The patient's exam today supports the presence of parkinsonism, along with some signs of peripheral neuropathy and memory changes.  He is alone today, so reporting may be suspect, but his bradykinsesia, gait impairment and masked facies are signs of parkinsonism that may be altered with sinemet trial.  We went over the medication and how it may be helpful, and he will keep a diary of his responses following taking the drug and with repeat use.  For now, I do not see a need to expand PD related testing towards swallow studies, or gait therapy, but this may be needed pending responses.     For his cognitive issues, I do see evidence of a more amnestic MCI pattern on his screen today.  He reports high degrees of function, but to further define if his reports are subjectively correct or not, I would want an objective test like a CPT.  To go along with his parkinsonism, an MRI brain would provide some insight into atrophy patterns or microvascular disease if present.     His exam shows mild sensorimotor ataxia and sensory loss in the legs consistent with peripheral neuropathy.  I may avoid EMG at this time, with the thought that he could obtain several serum based studies to look for reversible causes of both neuropathy and cognitive impairment. Pending results, we talked about how I would like him to use a cane or walking device given his imbalance with turns or transitions.     Plan:  MRI brain w/wout contrast  Sinemet 25/100 TID  OT for CPT, Neuropsychology testing  CBC, CMP, SPEP, Immunofixation, B12, TSH, RPR     Follow up in Neurology clinic in 2-3 months,  or should new concerns arise.\"     Essential hypertension with goal blood pressure less than 140/90  Controlled.    He was previously on HCTZ, stopped 5/2015. Continues lisinopril 10 mg twice daily.       CKD (chronic kidney disease) stage 3, GFR 30-59 ml/min  Baseline GFR 50s, " Cr 1.25-1.3 approx.     UA normal 7/23  continues ACEI   Avoids NSAIDS      Nocturia  Urinary urgency   BPH  He reports urinary urgency and urge incontinence is bothering him more. Denies dysuria or hematuria. Saw Urology. Tried flomax again, but developed orthostatic dizziness, so it was discontinued. He was referred to [facility] and was started on Gemtesa. He has not noticed improvement yet, but was told by his urologist that it might take some time before he notices benefit.     In the past, no improvement with myrbetriq and feels it is sedating. Previously stopped myrbetriq.      Saw urology 7/2023 - note reviewed    Reducing carbonated caffeinated beverage at night has not helped.   Will try myrbetriq starting at 25mg daily. He will follow up in a month for BP recheck given possible increased BP with the medication.      He continues to have nocturia once to three times nightly, that has been stable since going off HCTZ in 2015.  Did not notice a difference with flomax.      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 Silva.   Sleep study recommended as well as further evaluation for possible cognitive decline as a contributor to irregular sleep pattern.  In addition, sleep study revealed Cheyne-Delgadillo breathing.   1/20/25 echocardiogram was normal.      Hyperlipidemia    Stable. Continues simvastatin 40 mg daily.           Gustatory rhinitis   ipratropium nasal spray seems to help some.        Subjective   Al is a 79 year old, presenting for the following health issues:  Follow Up (Memory change)    History of Present Illness       Reason for visit:  Normal check up    He eats 2-3 servings of fruits and vegetables daily.He consumes 1 sweetened beverage(s) daily.He exercises with enough effort to increase his heart rate 9 or less minutes per day.  He exercises with enough effort to increase his heart rate 4 days per week. He is missing 1  "dose(s) of medications per week.  He is not taking prescribed medications regularly due to remembering to take.   Diego Perkins, 79 years    Dizziness and Lightheadedness  - Reported ongoing dizziness that has not improved after stopping Flomax, which was initially suspected as the cause.  - Dizziness described as a persistent lightheaded feeling, present most of the time, with some variation in severity but always present. Sometimes worse, sometimes better, but never fully resolves.  - Dizziness is sometimes present after standing up or getting out of bed, but also occurs at other times and is not clearly positional. Patient can feel it even while sitting.  - Dizziness started around the same time as starting carbidopa-levodopa (Sinemet).  - Denied vertigo; described sensation as lightheadedness rather than vertigo.  - Denied syncope, chest pain, shortness of breath, or palpitations during episodes of dizziness.  - Denied worsening of dizziness with exercise or walking; able to walk a mile or two a couple times a week without increased dizziness, or any chest pain, or shortness of breath. Also does deep knee bends and other exercises without worsening symptoms.  - No change in dizziness after starting Gemtesa a few days prior to the visit.    Urinary Symptoms  - Noted urinary stream drops off suddenly to nothing. Describes this as bothersome and annoying.  - Reports waking up two or three times by 9 AM to urinate  - Described urinary symptoms as bothersome and worsening over time.  - Started Gemtesa a few days prior to the visit. has urology follow up planned          Exercises regularly. Deep knee bends. Does not feel more dizzy when he is exercising.       Objective    /70 (BP Location: Right arm, Patient Position: Sitting, Cuff Size: Adult Small)   Pulse 64   Temp 97.3  F (36.3  C) (Temporal)   Resp 14   Ht 1.859 m (6' 1.2\")   Wt 60.4 kg (133 lb 1.6 oz)   SpO2 98%   BMI 17.46 kg/m    Body mass index is " 17.46 kg/m .  Physical Exam   GENERAL: alert and no distress  RESP: lungs clear to auscultation - no rales, rhonchi or wheezes  CV: regular rate and rhythm, normal S1 S2, no S3 or S4, no murmur, click or rub, no peripheral edema     Office Visit on 05/07/2025   Component Date Value Ref Range Status    Creatinine Urine mg/dL 05/07/2025 177.0  mg/dL Final    The reference ranges have not been established in urine creatinine. The results should be integrated into the clinical context for interpretation.    Albumin Urine mg/L 05/07/2025 27.7  mg/L Final    The reference ranges have not been established in urine albumin. The results should be integrated into the clinical context for interpretation.    Albumin Urine mg/g Cr 05/07/2025 15.65  0.00 - 17.00 mg/g Cr Final    Microalbuminuria is defined as an albumin:creatinine ratio of 17 to 299 for males and 25 to 299 for females. A ratio of albumin:creatinine of 300 or higher is indicative of overt proteinuria.  Due to biologic variability, positive results should be confirmed by a second, first-morning random or 24-hour timed urine specimen. If there is discrepancy, a third specimen is recommended. When 2 out of 3 results are in the microalbuminuria range, this is evidence for incipient nephropathy and warrants increased efforts at glucose control, blood pressure control, and institution of therapy with an angiotensin-converting-enzyme (ACE) inhibitor (if the patient can tolerate it).      Cholesterol 05/07/2025 180  <200 mg/dL Final    Triglycerides 05/07/2025 55  <150 mg/dL Final    Direct Measure HDL 05/07/2025 79  >=40 mg/dL Final    LDL Cholesterol Calculated 05/07/2025 90  <100 mg/dL Final    Non HDL Cholesterol 05/07/2025 101  <130 mg/dL Final    Patient Fasting > 8hrs? 05/07/2025 Yes   Final           Signed Electronically by: Fern Sheppard MD

## 2025-07-29 NOTE — Clinical Note
Diego Boggs Dr. has been noticing dizziness and has evidence of orthostatic hypotension which he thinks started around the time he started Sinemet. Initially, it was thought to be related to flomax, but symptoms have not improved since discontinuing the flomax. I have asked him to follow up with you to discuss whether you would recommend dose adjustment. He does not have an appointment scheduled until September. Let me know your thoughts.   Thanks,   Fern

## 2025-08-14 ENCOUNTER — LAB (OUTPATIENT)
Dept: LAB | Facility: CLINIC | Age: 80
End: 2025-08-14
Payer: COMMERCIAL

## 2025-08-14 ENCOUNTER — PRE VISIT (OUTPATIENT)
Dept: ONCOLOGY | Facility: CLINIC | Age: 80
End: 2025-08-14
Payer: COMMERCIAL

## 2025-08-14 ENCOUNTER — ONCOLOGY VISIT (OUTPATIENT)
Dept: ONCOLOGY | Facility: CLINIC | Age: 80
End: 2025-08-14
Attending: PSYCHIATRY & NEUROLOGY
Payer: COMMERCIAL

## 2025-08-14 VITALS
OXYGEN SATURATION: 99 % | WEIGHT: 128.7 LBS | RESPIRATION RATE: 16 BRPM | SYSTOLIC BLOOD PRESSURE: 122 MMHG | TEMPERATURE: 97.7 F | HEIGHT: 73 IN | HEART RATE: 63 BPM | DIASTOLIC BLOOD PRESSURE: 68 MMHG | BODY MASS INDEX: 17.06 KG/M2

## 2025-08-14 DIAGNOSIS — G25.81 RESTLESS LEGS SYNDROME: ICD-10-CM

## 2025-08-14 DIAGNOSIS — N18.1 CHRONIC KIDNEY DISEASE, STAGE 1: ICD-10-CM

## 2025-08-14 DIAGNOSIS — D47.2 MGUS (MONOCLONAL GAMMOPATHY OF UNKNOWN SIGNIFICANCE): ICD-10-CM

## 2025-08-14 DIAGNOSIS — D47.2 MGUS (MONOCLONAL GAMMOPATHY OF UNKNOWN SIGNIFICANCE): Primary | ICD-10-CM

## 2025-08-14 DIAGNOSIS — G62.9 NEUROPATHY: ICD-10-CM

## 2025-08-14 DIAGNOSIS — R73.03 PREDIABETES: ICD-10-CM

## 2025-08-14 DIAGNOSIS — N19 RENAL FAILURE, UNSPECIFIED CHRONICITY: ICD-10-CM

## 2025-08-14 LAB
ANION GAP SERPL CALCULATED.3IONS-SCNC: 10 MMOL/L (ref 7–15)
BASOPHILS # BLD AUTO: 0.04 10E3/UL (ref 0–0.2)
BASOPHILS NFR BLD AUTO: 0.9 %
BUN SERPL-MCNC: 31.2 MG/DL (ref 8–23)
CALCIUM SERPL-MCNC: 9.6 MG/DL (ref 8.8–10.4)
CHLORIDE SERPL-SCNC: 105 MMOL/L (ref 98–107)
CREAT SERPL-MCNC: 1.34 MG/DL (ref 0.67–1.17)
CRP SERPL-MCNC: <3 MG/L
EGFRCR SERPLBLD CKD-EPI 2021: 54 ML/MIN/1.73M2
EOSINOPHIL # BLD AUTO: 0.08 10E3/UL (ref 0–0.7)
EOSINOPHIL NFR BLD AUTO: 1.7 %
ERYTHROCYTE [DISTWIDTH] IN BLOOD BY AUTOMATED COUNT: 13.2 % (ref 10–15)
EST. AVERAGE GLUCOSE BLD GHB EST-MCNC: 126 MG/DL
FERRITIN SERPL-MCNC: 212 NG/ML (ref 31–409)
GLUCOSE SERPL-MCNC: 109 MG/DL (ref 70–99)
HBA1C MFR BLD: 6 %
HCO3 SERPL-SCNC: 27 MMOL/L (ref 22–29)
HCT VFR BLD AUTO: 42.8 % (ref 40–53)
HGB BLD-MCNC: 14.3 G/DL (ref 13.3–17.7)
IMM GRANULOCYTES # BLD: <0.03 10E3/UL
IMM GRANULOCYTES NFR BLD: 0.2 %
IRON BINDING CAPACITY (ROCHE): 250 UG/DL (ref 240–430)
IRON SATN MFR SERPL: 38 % (ref 15–46)
IRON SERPL-MCNC: 94 UG/DL (ref 61–157)
KAPPA LC FREE SER-MCNC: 2.06 MG/DL (ref 0.33–1.94)
KAPPA LC FREE/LAMBDA FREE SER NEPH: 0.97 {RATIO} (ref 0.26–1.65)
LAMBDA LC FREE SERPL-MCNC: 2.12 MG/DL (ref 0.57–2.63)
LYMPHOCYTES # BLD AUTO: 0.68 10E3/UL (ref 0.8–5.3)
LYMPHOCYTES NFR BLD AUTO: 14.7 %
MCH RBC QN AUTO: 33.7 PG (ref 26.5–33)
MCHC RBC AUTO-ENTMCNC: 33.4 G/DL (ref 31.5–36.5)
MCV RBC AUTO: 100.9 FL (ref 78–100)
MONOCYTES # BLD AUTO: 0.4 10E3/UL (ref 0–1.3)
MONOCYTES NFR BLD AUTO: 8.6 %
NEUTROPHILS # BLD AUTO: 3.43 10E3/UL (ref 1.6–8.3)
NEUTROPHILS NFR BLD AUTO: 73.9 %
NRBC # BLD AUTO: <0.03 10E3/UL
NRBC BLD AUTO-RTO: 0 /100
PLATELET # BLD AUTO: 155 10E3/UL (ref 150–450)
POTASSIUM SERPL-SCNC: 5 MMOL/L (ref 3.4–5.3)
PROT SERPL-MCNC: 6.3 G/DL (ref 6.4–8.3)
RBC # BLD AUTO: 4.24 10E6/UL (ref 4.4–5.9)
RETICS # AUTO: 0.05 10E6/UL (ref 0.03–0.1)
RETICS/RBC NFR AUTO: 1.08 % (ref 0.5–2)
SODIUM SERPL-SCNC: 142 MMOL/L (ref 135–145)
WBC # BLD AUTO: 4.64 10E3/UL (ref 4–11)

## 2025-08-14 PROCEDURE — 83036 HEMOGLOBIN GLYCOSYLATED A1C: CPT | Performed by: INTERNAL MEDICINE

## 2025-08-14 PROCEDURE — 85045 AUTOMATED RETICULOCYTE COUNT: CPT | Performed by: INTERNAL MEDICINE

## 2025-08-14 PROCEDURE — 83521 IG LIGHT CHAINS FREE EACH: CPT | Performed by: INTERNAL MEDICINE

## 2025-08-14 PROCEDURE — G0463 HOSPITAL OUTPT CLINIC VISIT: HCPCS | Performed by: INTERNAL MEDICINE

## 2025-08-14 PROCEDURE — 84165 PROTEIN E-PHORESIS SERUM: CPT | Mod: TC | Performed by: PATHOLOGY

## 2025-08-14 PROCEDURE — 84165 PROTEIN E-PHORESIS SERUM: CPT | Mod: 26 | Performed by: PATHOLOGY

## 2025-08-14 ASSESSMENT — PAIN SCALES - GENERAL: PAINLEVEL_OUTOF10: NO PAIN (0)

## 2025-08-26 ENCOUNTER — RESULTS FOLLOW-UP (OUTPATIENT)
Dept: ONCOLOGY | Facility: CLINIC | Age: 80
End: 2025-08-26
Payer: COMMERCIAL

## 2025-08-26 DIAGNOSIS — D47.2 MGUS (MONOCLONAL GAMMOPATHY OF UNKNOWN SIGNIFICANCE): Primary | ICD-10-CM
